# Patient Record
Sex: FEMALE | Race: WHITE | HISPANIC OR LATINO | Employment: UNEMPLOYED | URBAN - METROPOLITAN AREA
[De-identification: names, ages, dates, MRNs, and addresses within clinical notes are randomized per-mention and may not be internally consistent; named-entity substitution may affect disease eponyms.]

---

## 2022-10-13 ENCOUNTER — ULTRASOUND (OUTPATIENT)
Dept: OBGYN CLINIC | Facility: CLINIC | Age: 27
End: 2022-10-13

## 2022-10-13 VITALS
WEIGHT: 197 LBS | SYSTOLIC BLOOD PRESSURE: 118 MMHG | HEIGHT: 66 IN | BODY MASS INDEX: 31.66 KG/M2 | DIASTOLIC BLOOD PRESSURE: 72 MMHG

## 2022-10-13 DIAGNOSIS — Z13.79 GENETIC TESTING: Primary | ICD-10-CM

## 2022-10-13 NOTE — PROGRESS NOTES
Pt is here for early ultrasound   No concerns at this time      LMP   Irregular Cycles   No cycle in July, spotting in august and September for a few day   19 weeks 5 days   JACKLYN 3/4/23  Welcomed Pregnancy   No Vaginal Bleeding   No Nausea or Vomiting    No complications with previous pregnancy/delivery   All Vaginal deliveries         Procedure Details  A second trimester esteban pregnancy seen with biometry at 19+5      Findings:   Viable, esteban intrauterine pregnancy at 19+5 Final EDC to be set by M

## 2022-10-13 NOTE — PROGRESS NOTES
Pt is here for early ultrasound   No concerns at this time       LMP   Irregular Cycles   19 weeks 5 days   JACKLYN 3/4/23  Welcomed Pregnancy   No Vaginal Bleeding   No Nausea or Vomiting     No complications with previous pregnancy/delivery   All Vaginal deliveries         Procedure Details  A second trimester esteban pregnancy seen with biometry at 19+5 footling breech      Findings:   Viable, esteban intrauterine pregnancy at 19+5 Final EDC to be set by M

## 2022-10-14 ENCOUNTER — INITIAL PRENATAL (OUTPATIENT)
Dept: OBGYN CLINIC | Facility: CLINIC | Age: 27
End: 2022-10-14

## 2022-10-14 DIAGNOSIS — Z34.82 PRENATAL CARE, SUBSEQUENT PREGNANCY, SECOND TRIMESTER: Primary | ICD-10-CM

## 2022-10-14 NOTE — PROGRESS NOTES
OB INTAKE INTERVIEW  Patient is 28 y o  who presents for OB intake at 19-6 wks  She is accompanied by: phone interview  The father of her baby Ignacio Boles) is involved in the pregnancy and they are    IAB1  Last Menstrual Period: 2022  Ultrasound: Measured 19 weeks 5 days on 10/13/22  Estimated Date of Delivery: 3/4/2023 via US     Signs/Symptoms of Pregnancy  Current pregnancy symptoms: none  Constipation no  Headaches YES  Cramping/spotting no  PICA cravings no    Diabetes-    If patient has 1 or more, please order early 1 hour GTT  History of GDM no  BMI >35 no  History of PCOS or current metformin use no  History of LGA/macrosomic infant (4000g/9lbs) no    If patient has 2 or more, please order early 1 hour GTT  BMI>30 YES  AMA no  First degree relative with type 2 diabetes no  History of chronic HTN, hyperlipidemia, elevated A1C no  High risk race (, , ,  or ) YES    Hypertension- if you answer yes, please order preeclampsia labs (cbc, comprehensive metabolic panel, urine protein creatinine ratio, uric acid)  History of of chronic HTN no  History of gestational HTN no  History of preeclampsia, eclampsia, or HELLP syndrome no  History of diabetes no  History of lupus, autoimmune disease, kidney disease no    Thyroid- if yes order TSH with reflex T4  History of thyroid disease no    Bleeding Disorder or Hx of DVT-patient or first degree relative with history of  Order the following if not done previously     (Factor V, antithrombin III, prothrombin gene mutation, protein C and S Ag, lupus anticoagulant, anticardiolipin, beta-2 glycoprotein)   no    OB/GYN-  History of abnormal pap smear no  History of HPV no  History of Herpes/HSV no  History of other STI (gonorrhea, chlamydia, trich) no  History of prior  YES  History of prior  no  History of  delivery prior to 36 weeks 6 days no  History of blood transfusion no  Ok for blood transfusion yes    Substance screening- if yes outside of tobacco for her or anyone in her home-order urine drug screen  History of tobacco use no  Currently using tobacco no  Currently using alcohol no  Presently using drugs no  Past drug use  YES-marijuana  IV drug use-If yes add Hep C antibody to labs no  Partner drug use no  Parent/Family drug use no    MRSA Screening-   Does the pt have a hx of MRSA? no  If yes- please follow MRSA protocol and obtain a nasal swab for MRSA culture    Immunizations:  Influenza vaccine given this season no- was given last season  Discussed Tdap vaccine yes  Discussed COVID Vaccine no    Genetic/MFM-  Do you or your partner have a history of any of the following in yourselves or first degree relatives? Cystic fibrosis no  Spinal muscular atrophy no  Hemoglobinopathy/Sickle Cell/Thalassemia no  Fragile X Intellectual Disability no    If yes, discuss carrier screening and recommend consultation with [facility]/genetic counseling  If no, discuss option for carrier screening and/or genetic testing with [facility]  Patient interested no- late PN care  Appointment at [facility] made yes-  Monday 10/17/22    Interview education  [facility] Pregnancy Essentials Book reviewed and discussed yes    Nurse/Family Partnership- patient may qualify no; referral placed no    Prenatal lab work scripts yes  Extra labs ordered:  One hour glucola    The patient has a history now or in prior pregnancy notable for:   IAB1   Late PN care at 19-6 wks  BMI 31 8        Details that I feel the provider should be aware of: Pt did receive a flu vaccine last season  Denies receiving Covid vaccines  PN1 visit scheduled  The patient was oriented to our practice, reviewed delivering physicians and [facility]  All questions were answered  PN phone interview completed  Late PN care at 19-6 wks  Pt is  to [place]; he is father to her 2nd child, as well     Referral entered for [facility]- pt has an appt scheduled for Monday, 10/17/22 for US,  to confirm dates  PN bldwk ordered- encouraged pt to have bldwk drawn asap  Pt has a PN1 visit scheduled for 10/18/22  Advised pt to call with any further questions/concerns       Interviewed by: Prashanth Mendez RN

## 2022-10-15 ENCOUNTER — APPOINTMENT (OUTPATIENT)
Dept: LAB | Facility: HOSPITAL | Age: 27
End: 2022-10-15
Payer: COMMERCIAL

## 2022-10-15 DIAGNOSIS — Z34.82 PRENATAL CARE, SUBSEQUENT PREGNANCY, SECOND TRIMESTER: Primary | ICD-10-CM

## 2022-10-15 LAB
ABO GROUP BLD: NORMAL
BACTERIA UR QL AUTO: ABNORMAL /HPF
BASOPHILS # BLD AUTO: 0.02 THOUSANDS/ΜL (ref 0–0.1)
BASOPHILS NFR BLD AUTO: 0 % (ref 0–1)
BILIRUB UR QL STRIP: NEGATIVE
BLD GP AB SCN SERPL QL: NEGATIVE
CLARITY UR: CLEAR
COLOR UR: ABNORMAL
EOSINOPHIL # BLD AUTO: 0.21 THOUSAND/ΜL (ref 0–0.61)
EOSINOPHIL NFR BLD AUTO: 3 % (ref 0–6)
ERYTHROCYTE [DISTWIDTH] IN BLOOD BY AUTOMATED COUNT: 14.8 % (ref 11.6–15.1)
FERRITIN SERPL-MCNC: 5 NG/ML (ref 8–388)
GLUCOSE 1H P 50 G GLC PO SERPL-MCNC: 103 MG/DL (ref 40–134)
GLUCOSE UR STRIP-MCNC: NEGATIVE MG/DL
HBV SURFACE AG SER QL: NORMAL
HCT VFR BLD AUTO: 31 % (ref 34.8–46.1)
HCV AB SER QL: NORMAL
HGB BLD-MCNC: 9.9 G/DL (ref 11.5–15.4)
HGB UR QL STRIP.AUTO: NEGATIVE
IMM GRANULOCYTES # BLD AUTO: 0.03 THOUSAND/UL (ref 0–0.2)
IMM GRANULOCYTES NFR BLD AUTO: 0 % (ref 0–2)
KETONES UR STRIP-MCNC: NEGATIVE MG/DL
LEUKOCYTE ESTERASE UR QL STRIP: NEGATIVE
LYMPHOCYTES # BLD AUTO: 1.34 THOUSANDS/ΜL (ref 0.6–4.47)
LYMPHOCYTES NFR BLD AUTO: 18 % (ref 14–44)
MCH RBC QN AUTO: 26.4 PG (ref 26.8–34.3)
MCHC RBC AUTO-ENTMCNC: 31.9 G/DL (ref 31.4–37.4)
MCV RBC AUTO: 83 FL (ref 82–98)
MONOCYTES # BLD AUTO: 0.49 THOUSAND/ΜL (ref 0.17–1.22)
MONOCYTES NFR BLD AUTO: 7 % (ref 4–12)
MUCOUS THREADS UR QL AUTO: ABNORMAL
NEUTROPHILS # BLD AUTO: 5.24 THOUSANDS/ΜL (ref 1.85–7.62)
NEUTS SEG NFR BLD AUTO: 72 % (ref 43–75)
NITRITE UR QL STRIP: NEGATIVE
NON-SQ EPI CELLS URNS QL MICRO: ABNORMAL /HPF
NRBC BLD AUTO-RTO: 0 /100 WBCS
PH UR STRIP.AUTO: 7 [PH]
PLATELET # BLD AUTO: 312 THOUSANDS/UL (ref 149–390)
PMV BLD AUTO: 9.7 FL (ref 8.9–12.7)
PROT UR STRIP-MCNC: NEGATIVE MG/DL
RBC # BLD AUTO: 3.75 MILLION/UL (ref 3.81–5.12)
RBC #/AREA URNS AUTO: ABNORMAL /HPF
RH BLD: POSITIVE
RUBV IGG SERPL IA-ACNC: 27.2 IU/ML
SP GR UR STRIP.AUTO: 1.01 (ref 1–1.04)
UROBILINOGEN UA: NEGATIVE MG/DL
WBC # BLD AUTO: 7.33 THOUSAND/UL (ref 4.31–10.16)
WBC #/AREA URNS AUTO: ABNORMAL /HPF

## 2022-10-15 PROCEDURE — 82950 GLUCOSE TEST: CPT

## 2022-10-15 PROCEDURE — 82728 ASSAY OF FERRITIN: CPT

## 2022-10-15 PROCEDURE — 87086 URINE CULTURE/COLONY COUNT: CPT

## 2022-10-15 PROCEDURE — 36415 COLL VENOUS BLD VENIPUNCTURE: CPT

## 2022-10-15 PROCEDURE — 81001 URINALYSIS AUTO W/SCOPE: CPT

## 2022-10-15 PROCEDURE — 80081 OBSTETRIC PANEL INC HIV TSTG: CPT

## 2022-10-15 PROCEDURE — 86803 HEPATITIS C AB TEST: CPT

## 2022-10-16 LAB
BACTERIA UR CULT: NORMAL
RPR SER QL: NORMAL

## 2022-10-17 ENCOUNTER — TELEPHONE (OUTPATIENT)
Dept: LABOR AND DELIVERY | Facility: HOSPITAL | Age: 27
End: 2022-10-17

## 2022-10-17 ENCOUNTER — ROUTINE PRENATAL (OUTPATIENT)
Dept: PERINATAL CARE | Facility: OTHER | Age: 27
End: 2022-10-17
Payer: COMMERCIAL

## 2022-10-17 VITALS
HEIGHT: 66 IN | SYSTOLIC BLOOD PRESSURE: 128 MMHG | HEART RATE: 81 BPM | BODY MASS INDEX: 31.63 KG/M2 | WEIGHT: 196.8 LBS | DIASTOLIC BLOOD PRESSURE: 72 MMHG

## 2022-10-17 DIAGNOSIS — Z36.3 ENCOUNTER FOR ANTENATAL SCREENING FOR MALFORMATION: Primary | ICD-10-CM

## 2022-10-17 DIAGNOSIS — Z3A.20 20 WEEKS GESTATION OF PREGNANCY: Primary | ICD-10-CM

## 2022-10-17 DIAGNOSIS — Z13.79 GENETIC TESTING: ICD-10-CM

## 2022-10-17 DIAGNOSIS — F12.91 HISTORY OF MARIJUANA USE: ICD-10-CM

## 2022-10-17 DIAGNOSIS — Z3A.20 20 WEEKS GESTATION OF PREGNANCY: ICD-10-CM

## 2022-10-17 DIAGNOSIS — Z36.86 ENCOUNTER FOR ANTENATAL SCREENING FOR CERVICAL LENGTH: ICD-10-CM

## 2022-10-17 LAB — HIV 1+2 AB+HIV1 P24 AG SERPL QL IA: NORMAL

## 2022-10-17 PROCEDURE — 76805 OB US >/= 14 WKS SNGL FETUS: CPT | Performed by: OBSTETRICS & GYNECOLOGY

## 2022-10-17 PROCEDURE — 99242 OFF/OP CONSLTJ NEW/EST SF 20: CPT | Performed by: OBSTETRICS & GYNECOLOGY

## 2022-10-17 PROCEDURE — 76817 TRANSVAGINAL US OBSTETRIC: CPT | Performed by: OBSTETRICS & GYNECOLOGY

## 2022-10-17 NOTE — TELEPHONE ENCOUNTER
Attempted to call patient to inform her that [facility] recommended additional blood work that I have entered  This blood work tells us about some of the genetic risks in the pregnancy as well as the formation and development of the baby  No answer, unable to leave a message  Please try to reach to the patient and let her know quad is ordered

## 2022-10-17 NOTE — PROGRESS NOTES
Ultrasound Probe Disinfection    A transvaginal ultrasound was performed  Prior to use, disinfection was performed with High Level Disinfection Process (Trophon)  Probe serial number U2: [id number] was used        Kristy Hargrove  10/17/22  11:02 AM

## 2022-10-17 NOTE — TELEPHONE ENCOUNTER
Please let patient know labs are normal  Labs show anemia and low iron  Recommend starting an oral supplement every other day  If unable to tolerate can consider IV iron       Toma Zuniga MD

## 2022-10-17 NOTE — PATIENT INSTRUCTIONS
Thank you for choosing us for your  care today  If you have any questions about your ultrasound or care, please do not hesitate to contact us or your primary obstetrician  Some general instructions for your pregnancy are:    Protect against coronavirus: get vaccinated - pregnant women are increased risk of severe COVID  Notify your primary care doctor if you have any symptoms  Exercise: Aim for 22 minutes per day (150 minutes per week) of regular exercise  Walking is great! Nutrition: aim for calcium-rich and iron-rich foods as well as healthy sources of protein  Learn about Preeclampsia: preeclampsia is a common, serious high blood pressure complication in pregnancy  A blood pressure of 429RFVL (systolic or top number) or 38ORCE (diastolic or bottom number) is not normal and needs evaluation by your doctor  Aspirin is sometimes prescribed in early pregnancy to prevent preeclampsia in women with risk factors - ask your obstetrician if you should be on this medication  If you smoke, try to reduce how many cigarettes you smoke or try to quit completely  Do not vape  Other warning signs to watch out for in pregnancy or postpartum: chest pain, obstructed breathing or shortness of breath, seizures, thoughts of hurting yourself or your baby, bleeding, a painful or swollen leg, fever, or headache (see [facility] POST-BIRTH Warning Signs campaign)  If these happen call 911  Itching is also not normal in pregnancy and if you experience this, especially over your hands and feet, potentially worse at night, notify your doctors

## 2022-10-17 NOTE — LETTER
October 17, 2022     Elva Scott, [address] [facility] [address]   [address] [address]    Patient: Sb Chacon   YOB: 1995   Date of Visit: 10/17/2022       Dear Dr Sorin Huston: Thank you for referring Sb Chacon to me for evaluation  Below are my notes for this consultation  If you have questions, please do not hesitate to call me  I look forward to following your patient along with you           Sincerely,        Andrzej Solorzano MD        CC: No Recipients

## 2022-10-17 NOTE — PROGRESS NOTES
[id number] [facility]: Ms Frankie Santiago was seen today for anatomic survey and cervical length screening ultrasound  See ultrasound report under "OB Procedures" tab  Review of Systems   Constitutional: Negative for chills, fever and unexpected weight change  HENT: Negative for congestion, dental problem, facial swelling and sore throat  Eyes: Negative for visual disturbance  Respiratory: Negative for cough and shortness of breath  Cardiovascular: Negative for chest pain and palpitations  Gastrointestinal: Negative for diarrhea and vomiting  Endocrine: Negative for polydipsia  Genitourinary: Negative for dysuria and vaginal bleeding  Musculoskeletal: Negative for back pain and joint swelling  Skin: Negative for rash and wound  Allergic/Immunologic: Negative for immunocompromised state  Neurological: Negative for seizures and headaches  Hematological: Does not bruise/bleed easily  Psychiatric/Behavioral: Negative for hallucinations and suicidal ideas  Physical Exam  Constitutional:       General: She is not in acute distress  Appearance: Normal appearance  She is not ill-appearing, toxic-appearing or diaphoretic  HENT:      Head: Normocephalic and atraumatic  Nose: No congestion or rhinorrhea  Eyes:      General: No scleral icterus  Right eye: No discharge  Left eye: No discharge  Extraocular Movements: Extraocular movements intact  Conjunctiva/sclera: Conjunctivae normal    Pulmonary:      Effort: Pulmonary effort is normal  No respiratory distress  Musculoskeletal:      Cervical back: Normal range of motion  Skin:     Coloration: Skin is not jaundiced or pale  Findings: No erythema, lesion or rash  Neurological:      General: No focal deficit present  Mental Status: She is alert and oriented to person, place, and time     Psychiatric:         Mood and Affect: Mood normal          Behavior: Behavior normal  Please don't hesitate to contact our office with any concerns or questions   -Kevin Henning

## 2022-10-18 ENCOUNTER — INITIAL PRENATAL (OUTPATIENT)
Dept: OBGYN CLINIC | Facility: CLINIC | Age: 27
End: 2022-10-18

## 2022-10-18 VITALS
SYSTOLIC BLOOD PRESSURE: 112 MMHG | BODY MASS INDEX: 30.76 KG/M2 | HEIGHT: 67 IN | DIASTOLIC BLOOD PRESSURE: 70 MMHG | WEIGHT: 196 LBS

## 2022-10-18 DIAGNOSIS — O99.012 ANEMIA AFFECTING PREGNANCY IN SECOND TRIMESTER: ICD-10-CM

## 2022-10-18 DIAGNOSIS — Z30.49 ENCOUNTER FOR SURVEILLANCE OF OTHER CONTRACEPTIVE: ICD-10-CM

## 2022-10-18 DIAGNOSIS — Z23 NEED FOR INFLUENZA VACCINATION: ICD-10-CM

## 2022-10-18 DIAGNOSIS — F12.91 HISTORY OF MARIJUANA USE: ICD-10-CM

## 2022-10-18 DIAGNOSIS — Z34.82 PRENATAL CARE, SUBSEQUENT PREGNANCY, SECOND TRIMESTER: Primary | ICD-10-CM

## 2022-10-18 DIAGNOSIS — Z11.3 SCREEN FOR STD (SEXUALLY TRANSMITTED DISEASE): ICD-10-CM

## 2022-10-18 DIAGNOSIS — Z3A.20 20 WEEKS GESTATION OF PREGNANCY: ICD-10-CM

## 2022-10-18 DIAGNOSIS — Z12.4 CERVICAL CANCER SCREENING: ICD-10-CM

## 2022-10-18 PROBLEM — Z30.9 CONTRACEPTION MANAGEMENT: Status: ACTIVE | Noted: 2022-10-18

## 2022-10-18 LAB
SL AMB  POCT GLUCOSE, UA: NEGATIVE
SL AMB POCT URINE PROTEIN: ABNORMAL

## 2022-10-18 PROCEDURE — G0145 SCR C/V CYTO,THINLAYER,RESCR: HCPCS | Performed by: OBSTETRICS & GYNECOLOGY

## 2022-10-18 PROCEDURE — 87491 CHLMYD TRACH DNA AMP PROBE: CPT | Performed by: OBSTETRICS & GYNECOLOGY

## 2022-10-18 PROCEDURE — 87591 N.GONORRHOEAE DNA AMP PROB: CPT | Performed by: OBSTETRICS & GYNECOLOGY

## 2022-10-18 NOTE — ASSESSMENT & PLAN NOTE
Almost sure this is last pregnancy  Previously got pregnant using ParaGard IUD so does not want IUD  Discussed options for permanent contraception including salpingectomy and vasectomy; discussed senia slater  Will continue to discuss

## 2022-10-18 NOTE — ASSESSMENT & PLAN NOTE
Hx irregular periods and did not realize she was this far pregnant until ultrasound; happy with pregnancy  Prenatal labs complete  Gc/chlam, pap done today  Level 2 yesterday - did not ask what gender was but wants to know; encouraged to call back  Has 28 week follow up scan for additional anatomy views and growth

## 2022-10-20 LAB
C TRACH DNA SPEC QL NAA+PROBE: NEGATIVE
N GONORRHOEA DNA SPEC QL NAA+PROBE: NEGATIVE

## 2022-10-22 ENCOUNTER — APPOINTMENT (OUTPATIENT)
Dept: LAB | Facility: HOSPITAL | Age: 27
End: 2022-10-22
Payer: COMMERCIAL

## 2022-10-22 DIAGNOSIS — Z3A.20 20 WEEKS GESTATION OF PREGNANCY: ICD-10-CM

## 2022-10-22 PROCEDURE — 82105 ALPHA-FETOPROTEIN SERUM: CPT

## 2022-10-22 PROCEDURE — 82677 ASSAY OF ESTRIOL: CPT

## 2022-10-22 PROCEDURE — 84702 CHORIONIC GONADOTROPIN TEST: CPT

## 2022-10-22 PROCEDURE — 86336 INHIBIN A: CPT

## 2022-10-24 LAB
LAB AP GYN PRIMARY INTERPRETATION: NORMAL
LAB AP LMP: NORMAL
Lab: NORMAL
PATH INTERP SPEC-IMP: NORMAL

## 2022-10-26 LAB
2ND TRIMESTER 4 SCREEN SERPL-IMP: NORMAL
2ND TRIMESTER 4 SCREEN SERPL-IMP: NORMAL
AFP ADJ MOM SERPL: 0.72
AFP SERPL-MCNC: 41.3 NG/ML
AGE AT DELIVERY: 28.6 YR
FET TS 18 RISK FROM MAT AGE: NORMAL
FET TS 21 RISK FROM MAT AGE: 812
GA METHOD: NORMAL
GA: 21 WEEKS
HCG ADJ MOM SERPL: 0.11
HCG SERPL-ACNC: 2440 MIU/ML
IDDM PATIENT QL: NO
INHIBIN A ADJ MOM SERPL: 1.22
INHIBIN A SERPL-MCNC: 219.42 PG/ML
KARYOTYP BLD/T: NORMAL
MULTIPLE PREGNANCY: NO
NEURAL TUBE DEFECT RISK FETUS: NORMAL %
SERVICE CMNT-IMP: NORMAL
TS 18 RISK FETUS: NORMAL
TS 21 RISK FETUS: NORMAL
U ESTRIOL ADJ MOM SERPL: 1.18
U ESTRIOL SERPL-MCNC: 3.16 NG/ML

## 2022-11-14 ENCOUNTER — ROUTINE PRENATAL (OUTPATIENT)
Dept: OBGYN CLINIC | Facility: CLINIC | Age: 27
End: 2022-11-14

## 2022-11-14 VITALS — SYSTOLIC BLOOD PRESSURE: 120 MMHG | DIASTOLIC BLOOD PRESSURE: 70 MMHG | BODY MASS INDEX: 31.67 KG/M2 | WEIGHT: 199.2 LBS

## 2022-11-14 DIAGNOSIS — M54.9 BACK PAIN AFFECTING PREGNANCY IN SECOND TRIMESTER: ICD-10-CM

## 2022-11-14 DIAGNOSIS — Z34.82 PRENATAL CARE, SUBSEQUENT PREGNANCY, SECOND TRIMESTER: Primary | ICD-10-CM

## 2022-11-14 DIAGNOSIS — O99.891 BACK PAIN AFFECTING PREGNANCY IN SECOND TRIMESTER: ICD-10-CM

## 2022-11-14 DIAGNOSIS — O99.012 ANEMIA AFFECTING PREGNANCY IN SECOND TRIMESTER: ICD-10-CM

## 2022-11-14 DIAGNOSIS — Z3A.24 24 WEEKS GESTATION OF PREGNANCY: ICD-10-CM

## 2022-11-14 LAB
SL AMB  POCT GLUCOSE, UA: NEGATIVE
SL AMB POCT URINE PROTEIN: NEGATIVE

## 2022-11-14 NOTE — ASSESSMENT & PLAN NOTE
24w2d  Doing well  Reviewed the following today:  • S/S PTL including to call if having >  4-6 ctx in an hour  • S/S HTN disorders in pregnancy such as pre-eclampsia  • Orders for routine 3rd trimester bloodwork given today -to be done around 26-28 wks- see ordered  • Tdap vaccine- to be given to all pregnant women in the 3rd trimester (27-36 weeks) of each pregnancy in order to protect against pertussis  It is also recommended that anyone who is going to have close contact with the baby also get the vaccine at their health care provider

## 2022-11-14 NOTE — PROGRESS NOTES
Prenatal Visit  Subjective:   Jevon Sheyla is a 28 y o  [id number] 24w2d here for Routine Prenatal Visit    Denies unusual vaginal discharge, LOF, VB, or ctx  Reports Fetus is active  C/O back pain  All of back, not just lower back  A lot in her shoulders  Sleeping with pillow between her legs  Taking tylenol for this  Mild anemia on routine PNBW- NOT taking iron daily/every other day      Objective:  Vitals:    11/14/22 1123   BP: 120/70     Pregravid Weight/BMI: 79 4 kg (175 lb) (BMI 27 83)  Current Weight: 90 4 kg (199 lb 3 2 oz)   Total Weight Gain: 11 kg (24 lb 3 2 oz)     Fetal Heart Rate: 145  Fundal Height (cm): 24 cm    OBGyn Exam  Physical Exam  Fetal Heart Rate: 145 , Fundal Height (cm): 24 cm  General: Not in acute distress and appearing well nourished and well groomed  Genitourinary:          Pelvic exam exam deferred   Cardiovascular:   Rate and Rhythm: Normal rate  Pulmonary:    Effort: normal, not labored  Abdominal:  Abdomen is soft and gravid    Musculoskeletal: Active movement of all extremities, no gross limitations of ROM     Edema: None  Neurological:   Mental Status: She is alert and oriented to person, place, and time  Skin: General: Skin is warm and dry  Psychiatric:    Mood and Affect: Mood normal       Behavior: Behavior normal      Assessment & Plan:        1  Prenatal care, subsequent pregnancy, second trimester  -     Anemia Panel w/Reflex, OB; Future; Expected date: 11/26/2022  -     CBC and differential; Future; Expected date: 11/26/2022  -     Glucose, 1H PG; Future; Expected date: 11/26/2022  -     RPR; Future; Expected date: 11/26/2022  -     POCT urine dip    2  24 weeks gestation of pregnancy  Assessment & Plan:  24w2d  Doing well  Reviewed the following today:  • S/S PTL including to call if having >  4-6 ctx in an hour     • S/S HTN disorders in pregnancy such as pre-eclampsia  • Orders for routine 3rd trimester bloodwork given today -to be done around 26-28 wks- see ordered  • Tdap vaccine- to be given to all pregnant women in the 3rd trimester (27-36 weeks) of each pregnancy in order to protect against pertussis  It is also recommended that anyone who is going to have close contact with the baby also get the vaccine at their health care provider  3  Anemia affecting pregnancy in second trimester  Assessment & Plan:  Is not Taking iron as previously directed  Due for recheck with 3rd trimester labs  Encouraged to start asap  4  Back pain affecting pregnancy in second trimester  Assessment & Plan:  Pt c/o back pain   Reviewed most commonly due to anatomic changes in pregnancy affecting curvature of lower back and altering center of gravity- making pelvic girdle slighlty dysfunctional/unbalanced  However may be more involved as she had upper back and shoulder pain as well  Reviewed supportive measures including Tylenol, heat/ice and at home back exercises/stretches  Also reviewed availability of PT or chiropractic adjustments, both which are shown to decrease discomfort for most women              ROSA Kruger  11/14/2022

## 2022-11-14 NOTE — PATIENT INSTRUCTIONS
Tailor Sit, Trunk Turn for Back Pain During Pregnancy  Before trying these exercises, talk to your healthcare provider to make sure they are safe for you  Ask your healthcare provider how many times to do each exercise  Tailor sit  This exercise makes your thigh, pelvic, and hip muscles more flexible  Sit on the floor with the soles of your feet together  Your back should be straight  Gently lean forward until you feel a mild stretch in your hip and thigh muscles  Your back should remain straight  Don't push down on your legs with your hands  Hold and count to 5, then relax  Trunk turns  This helps make your trunk (from your shoulders to your hips) more flexible  Sit on the floor with your legs crossed  Your back should be straight  Put your left hand on your right knee  Rest your right hand on the floor to support yourself and help you balance  Slowly twist right  To do this, turn your head, shoulders, and chest as far right as you comfortably can  Keep your hips, knees, and feet in place  Hold for 5 counts  Then change sides and slowly twist left  Wall Stretch, Body Bend  Before trying these exercises, talk to your healthcare provider to make sure they are safe for you  Ask your healthcare provider how many times to do each exercise  Wall stretch  This strengthens and loosens the muscles in your upper back:  Lean against a wall with a firm pillow or rolled towel under your shoulder blades  Your feet should be about 12 inches from the wall and shoulder-width apart  Point your chin down  Breathe in  Push your shoulders, neck, and head against the wall  You will feel a stretch in your shoulders  Hold for 5 counts  Then breathe out, and relax your shoulders and neck  Pelvic tilt  This exercise stretches muscles in your buttocks and lower back  It also strengthens your stomach and helps set up good posture  Get on your hands and knees with your back straight   A mat can help cushion your knees   Try to pull your stomach muscles in  Tuck in your buttocks  This will tilt your pelvis up  As your pelvis tilts, your back will rise toward the ceiling  Hold and count to 5, then relax  Leg lifts  This strengthens the muscles of your back, buttocks, and stomach  Get down on your hands and knees  Put your arms directly under your shoulders  Keep your knees shoulder-width apart  Round your back  Then lift your left knee and gently bring it toward your elbow  Look at your knee as you raise it  (Stop moving your knee if you feel pressure in your stomach )  Keeping your knee slightly bent, extend your leg  Lift your leg until you feel a stretch in your low back  Don't lift your leg higher than your hip  Hold for 5 counts, then lower your left leg  Repeat the exercise with your right leg  Body bends  This strengthens your back and buttocks muscles:  Stand with your legs shoulder-width apart  Put your hands on your upper thighs and bend your knees slightly  Slowly bend forward at the hips  Push your hips back and keep your shoulders up  Make sure your back is straight  Nahed Edgar feel a stretch in your upper thighs  Nahed Edgar also feel your back muscles holding you in position    Hold for 5 counts, then straighten   ------------------------------------------    Can use over the counter hydrocortisone for nipple itching    _--------------------__----------------------------------

## 2022-11-14 NOTE — ASSESSMENT & PLAN NOTE
Pt c/o back pain   Reviewed most commonly due to anatomic changes in pregnancy affecting curvature of lower back and altering center of gravity- making pelvic girdle slighlty dysfunctional/unbalanced  However may be more involved as she had upper back and shoulder pain as well  Reviewed supportive measures including Tylenol, heat/ice and at home back exercises/stretches  Also reviewed availability of PT or chiropractic adjustments, both which are shown to decrease discomfort for most women

## 2022-11-14 NOTE — ASSESSMENT & PLAN NOTE
Is not Taking iron as previously directed  Due for recheck with 3rd trimester labs  Encouraged to start asap

## 2022-11-14 NOTE — PROGRESS NOTES
Pt here for prenatal visit  GA: 24w 2d  Pt C/o back pain   Denies LOF,VB,CTX  Good FM  28 Week labs ordered today   UTD with flu vaccine

## 2022-11-19 ENCOUNTER — LAB (OUTPATIENT)
Dept: LAB | Facility: HOSPITAL | Age: 27
End: 2022-11-19

## 2022-11-19 DIAGNOSIS — Z34.82 PRENATAL CARE, SUBSEQUENT PREGNANCY, SECOND TRIMESTER: ICD-10-CM

## 2022-11-19 DIAGNOSIS — O99.810 ABNORMAL GLUCOSE AFFECTING PREGNANCY: Primary | ICD-10-CM

## 2022-11-19 LAB
BASOPHILS # BLD AUTO: 0.04 THOUSANDS/ÂΜL (ref 0–0.1)
BASOPHILS NFR BLD AUTO: 1 % (ref 0–1)
EOSINOPHIL # BLD AUTO: 0.13 THOUSAND/ÂΜL (ref 0–0.61)
EOSINOPHIL NFR BLD AUTO: 2 % (ref 0–6)
ERYTHROCYTE [DISTWIDTH] IN BLOOD BY AUTOMATED COUNT: 15.5 % (ref 11.6–15.1)
FERRITIN SERPL-MCNC: 5 NG/ML (ref 8–388)
GLUCOSE 1H P 50 G GLC PO SERPL-MCNC: 146 MG/DL (ref 40–134)
HCT VFR BLD AUTO: 28.9 % (ref 34.8–46.1)
HGB BLD-MCNC: 9 G/DL (ref 11.5–15.4)
IMM GRANULOCYTES # BLD AUTO: 0.08 THOUSAND/UL (ref 0–0.2)
IMM GRANULOCYTES NFR BLD AUTO: 1 % (ref 0–2)
LYMPHOCYTES # BLD AUTO: 1.47 THOUSANDS/ÂΜL (ref 0.6–4.47)
LYMPHOCYTES NFR BLD AUTO: 18 % (ref 14–44)
MCH RBC QN AUTO: 26.6 PG (ref 26.8–34.3)
MCHC RBC AUTO-ENTMCNC: 31.1 G/DL (ref 31.4–37.4)
MCV RBC AUTO: 86 FL (ref 82–98)
MONOCYTES # BLD AUTO: 0.44 THOUSAND/ÂΜL (ref 0.17–1.22)
MONOCYTES NFR BLD AUTO: 6 % (ref 4–12)
NEUTROPHILS # BLD AUTO: 5.89 THOUSANDS/ÂΜL (ref 1.85–7.62)
NEUTS SEG NFR BLD AUTO: 72 % (ref 43–75)
NRBC BLD AUTO-RTO: 0 /100 WBCS
PLATELET # BLD AUTO: 277 THOUSANDS/UL (ref 149–390)
PMV BLD AUTO: 9.7 FL (ref 8.9–12.7)
RBC # BLD AUTO: 3.38 MILLION/UL (ref 3.81–5.12)
WBC # BLD AUTO: 8.05 THOUSAND/UL (ref 4.31–10.16)

## 2022-11-20 LAB — RPR SER QL: NORMAL

## 2022-11-21 ENCOUNTER — TELEPHONE (OUTPATIENT)
Dept: OBGYN CLINIC | Facility: CLINIC | Age: 27
End: 2022-11-21

## 2022-11-21 DIAGNOSIS — O99.012 ANEMIA AFFECTING PREGNANCY IN SECOND TRIMESTER: Primary | ICD-10-CM

## 2022-11-21 RX ORDER — SODIUM CHLORIDE 9 MG/ML
20 INJECTION, SOLUTION INTRAVENOUS ONCE
OUTPATIENT
Start: 2022-11-28

## 2022-11-21 NOTE — RESULT ENCOUNTER NOTE
Abnromal glucola  Needs 3 hour gtt  Also H/H low as well as ferritin  Will need IV iron infusions  Orders  entered   Please notify pt

## 2022-11-21 NOTE — TELEPHONE ENCOUNTER
Patient notified of test results  Out of town for the remainder of this week  Will call infusion center to schedule  Patient lives in [facility] so prefers [facility] for infusions  Aware we will call her first thing in morning

## 2022-11-21 NOTE — TELEPHONE ENCOUNTER
----- Message from Nicole Bell, Gio David sent at 11/21/2022  7:49 AM EST -----  Abnromal glucola  Needs 3 hour gtt  Also H/H low as well as ferritin  Will need IV iron infusions  Orders  entered   Please notify pt

## 2022-11-22 ENCOUNTER — TELEPHONE (OUTPATIENT)
Dept: OBGYN CLINIC | Facility: CLINIC | Age: 27
End: 2022-11-22

## 2022-11-22 DIAGNOSIS — O99.012 ANEMIA AFFECTING PREGNANCY IN SECOND TRIMESTER: Primary | ICD-10-CM

## 2022-11-22 NOTE — TELEPHONE ENCOUNTER
Patient's insurance does not cover infusions - being that she is anemic due to pregnancy the only option would be to reach out to financial counseling      Yulisa @ [phone] if any questions

## 2022-11-22 NOTE — TELEPHONE ENCOUNTER
Called pt- per pt any day next week in am is good  Pt states "she will need to bring her child with her"  Informed pt , not sure if infusion center will allow children  Called [facility] infusion center - first venofer infusion scheduled for Wed 11/30/22 at 0900 , per , children are not allowed  Called pt to inform of date & time, & advised of no children policy  Pt stated "she will find someone to watch her child"

## 2022-11-23 ENCOUNTER — DOCUMENTATION (OUTPATIENT)
Dept: HEMATOLOGY ONCOLOGY | Facility: CLINIC | Age: 27
End: 2022-11-23

## 2022-11-23 NOTE — TELEPHONE ENCOUNTER
Lm on pt cell, to call financial to get assistance, as I called her insurance and confirmed they do not cover infusions as she has a limited indemnity plan not major medical [id number]

## 2022-11-23 NOTE — PROGRESS NOTES
Received email from Leonidas Puri that pts ins is a limited indemnity plan  Called ins & spoke to [facility] call ref# [id number]  Pt does have a limited indemnity plan effective 10/02/22  plan runs on a cal year  Plan is thru [address] has the following benefits  THE MAX THE PLAN WILL PAY IS $25K for all these services combined    [address] pays $700 per day up to 25K    IN PT SURGEON  OUT PT SURGEON  Need cpt codes for the amount the ins pays    PCP & SPECIALIST OFFICE VISITS  Plan pays $70 per day up to 25K    PET SCAN & MRI & CT SCAN  Plan pays $300 per day up to $25K    XRAY  Plan pays $55 per day up to $25K    LABS  Plan pays $15 per day up to $25K    No benefits for the following  [address] pt to go over this info & she sd that she will have to look around for other ins  She is going to go on the [employer] website & then she will reach out to me if she has any questions about plans that she likes to make sure that we are par w/that plan  Also gave her the # to the f/c for the hospital since she has a high bal  She thanked me for the call & the info

## 2022-12-13 ENCOUNTER — ROUTINE PRENATAL (OUTPATIENT)
Dept: OBGYN CLINIC | Facility: CLINIC | Age: 27
End: 2022-12-13

## 2022-12-13 VITALS — DIASTOLIC BLOOD PRESSURE: 68 MMHG | BODY MASS INDEX: 32.18 KG/M2 | WEIGHT: 202.4 LBS | SYSTOLIC BLOOD PRESSURE: 118 MMHG

## 2022-12-13 DIAGNOSIS — O99.810 ABNORMAL GLUCOSE AFFECTING PREGNANCY: ICD-10-CM

## 2022-12-13 DIAGNOSIS — Z34.93 PRENATAL CARE IN THIRD TRIMESTER: Primary | ICD-10-CM

## 2022-12-13 DIAGNOSIS — Z3A.28 28 WEEKS GESTATION OF PREGNANCY: ICD-10-CM

## 2022-12-13 DIAGNOSIS — O99.012 ANEMIA AFFECTING PREGNANCY IN SECOND TRIMESTER: ICD-10-CM

## 2022-12-13 LAB
SL AMB  POCT GLUCOSE, UA: NORMAL
SL AMB POCT URINE PROTEIN: NORMAL

## 2022-12-13 RX ORDER — FERROUS SULFATE 325(65) MG
325 TABLET ORAL
COMMUNITY

## 2022-12-13 NOTE — ASSESSMENT & PLAN NOTE
Referred for IV iron given Hgb 9 0  Pt's insurance will not cover infusions  She is currently working on applying for [employer]  In the meantime has started PO iron and started introducing more iron rich foods in her diet  Last CBC was 11/19/22  Will plan to repeat CBC now while we wait for her new insurance   Continue PO iron for now  Advised to complete CBC/anemia panel within the next week

## 2022-12-13 NOTE — PROGRESS NOTES
28 weeks gestation of pregnancy  Rosa Maria  is a 28 y o  [id number] @28w3d who presents for routine prenatal visit  28 wk labs - complete; see note below regarding anemia and elevated 1 hour  Growth scan- scheduled  TDAP- will have today   Has yellow packet  Reports good fetal movement  Denies LOF, vaginal bleeding, regular uterine contractions, cramping, headaches or visual changes  Reviewed PTL/Labor precautions and FKC  Abnormal glucose affecting pregnancy  Reminded patient she must complete 3 hour GTT    Anemia affecting pregnancy in second trimester  Referred for IV iron given Hgb 9 0  Pt's insurance will not cover infusions  She is currently working on applying for [employer]  In the meantime has started PO iron and started introducing more iron rich foods in her diet  Last CBC was 11/19/22  Will plan to repeat CBC now while we wait for her new insurance   Continue PO iron for now  Advised to complete CBC/anemia panel within the next week

## 2022-12-13 NOTE — ASSESSMENT & PLAN NOTE
Gwen Wang  is a 28 y o  [id number] @28w3d who presents for routine prenatal visit  28 wk labs - complete; see note below regarding anemia and elevated 1 hour  Growth scan- scheduled  TDAP- will have today   Has yellow packet  Reports good fetal movement  Denies LOF, vaginal bleeding, regular uterine contractions, cramping, headaches or visual changes  Reviewed PTL/Labor precautions and FKC

## 2022-12-17 ENCOUNTER — APPOINTMENT (OUTPATIENT)
Dept: LAB | Facility: HOSPITAL | Age: 27
End: 2022-12-17

## 2022-12-17 DIAGNOSIS — O99.012 ANEMIA AFFECTING PREGNANCY IN SECOND TRIMESTER: ICD-10-CM

## 2022-12-17 LAB
BASOPHILS # BLD AUTO: 0.05 THOUSANDS/ÂΜL (ref 0–0.1)
BASOPHILS NFR BLD AUTO: 1 % (ref 0–1)
EOSINOPHIL # BLD AUTO: 0.19 THOUSAND/ÂΜL (ref 0–0.61)
EOSINOPHIL NFR BLD AUTO: 2 % (ref 0–6)
ERYTHROCYTE [DISTWIDTH] IN BLOOD BY AUTOMATED COUNT: 18 % (ref 11.6–15.1)
HCT VFR BLD AUTO: 32.8 % (ref 34.8–46.1)
HGB BLD-MCNC: 9.8 G/DL (ref 11.5–15.4)
IMM GRANULOCYTES # BLD AUTO: 0.1 THOUSAND/UL (ref 0–0.2)
IMM GRANULOCYTES NFR BLD AUTO: 1 % (ref 0–2)
LYMPHOCYTES # BLD AUTO: 1.96 THOUSANDS/ÂΜL (ref 0.6–4.47)
LYMPHOCYTES NFR BLD AUTO: 22 % (ref 14–44)
MCH RBC QN AUTO: 26.7 PG (ref 26.8–34.3)
MCHC RBC AUTO-ENTMCNC: 29.9 G/DL (ref 31.4–37.4)
MCV RBC AUTO: 89 FL (ref 82–98)
MONOCYTES # BLD AUTO: 0.63 THOUSAND/ÂΜL (ref 0.17–1.22)
MONOCYTES NFR BLD AUTO: 7 % (ref 4–12)
NEUTROPHILS # BLD AUTO: 6.03 THOUSANDS/ÂΜL (ref 1.85–7.62)
NEUTS SEG NFR BLD AUTO: 67 % (ref 43–75)
NRBC BLD AUTO-RTO: 0 /100 WBCS
PLATELET # BLD AUTO: 255 THOUSANDS/UL (ref 149–390)
PMV BLD AUTO: 9.8 FL (ref 8.9–12.7)
RBC # BLD AUTO: 3.67 MILLION/UL (ref 3.81–5.12)
WBC # BLD AUTO: 8.96 THOUSAND/UL (ref 4.31–10.16)

## 2022-12-18 LAB — FERRITIN SERPL-MCNC: 15 NG/ML (ref 8–388)

## 2022-12-20 ENCOUNTER — LAB (OUTPATIENT)
Dept: LAB | Facility: HOSPITAL | Age: 27
End: 2022-12-20

## 2022-12-20 DIAGNOSIS — O99.810 ABNORMAL GLUCOSE AFFECTING PREGNANCY: ICD-10-CM

## 2022-12-20 DIAGNOSIS — O24.410 DIET CONTROLLED GESTATIONAL DIABETES MELLITUS (GDM) IN THIRD TRIMESTER: Primary | ICD-10-CM

## 2022-12-20 LAB — GLUCOSE P FAST SERPL-MCNC: 103 MG/DL (ref 65–94)

## 2022-12-20 NOTE — RESULT ENCOUNTER NOTE
FBS on 3 hour gtt abnormal= GDM dx  Referral to [facility] Diabetes entered     Pt notified

## 2022-12-27 ENCOUNTER — ROUTINE PRENATAL (OUTPATIENT)
Dept: OBGYN CLINIC | Facility: CLINIC | Age: 27
End: 2022-12-27

## 2022-12-27 VITALS — BODY MASS INDEX: 32.31 KG/M2 | WEIGHT: 203.2 LBS | DIASTOLIC BLOOD PRESSURE: 62 MMHG | SYSTOLIC BLOOD PRESSURE: 122 MMHG

## 2022-12-27 DIAGNOSIS — O99.012 ANEMIA AFFECTING PREGNANCY IN SECOND TRIMESTER: ICD-10-CM

## 2022-12-27 DIAGNOSIS — O24.410 DIET CONTROLLED GESTATIONAL DIABETES MELLITUS (GDM) IN THIRD TRIMESTER: ICD-10-CM

## 2022-12-27 DIAGNOSIS — Z30.49 ENCOUNTER FOR SURVEILLANCE OF OTHER CONTRACEPTIVE: ICD-10-CM

## 2022-12-27 DIAGNOSIS — Z34.83 PRENATAL CARE, SUBSEQUENT PREGNANCY, THIRD TRIMESTER: Primary | ICD-10-CM

## 2022-12-27 DIAGNOSIS — Z3A.30 30 WEEKS GESTATION OF PREGNANCY: ICD-10-CM

## 2022-12-27 LAB
SL AMB  POCT GLUCOSE, UA: NORMAL
SL AMB POCT URINE PROTEIN: NORMAL

## 2022-12-27 NOTE — ASSESSMENT & PLAN NOTE
Had elevated 1h GCT and elevated fasting on 3h GTT  She states she ate at 1am  (~7 hrs prior to fasting test) - discussed could be reason for elevation or could be truly elevated  Option to repeat 3h GTT vs just check fingersticks for a week discussed - she has appointment tomorrow with the diabetic educator at [place] [address]  She will likely just keep this appointment and check fingersticks for one week  Given above information, if all fingersticks normal after one week, could likely discontinue     No results found for: HGBA1C

## 2022-12-27 NOTE — ASSESSMENT & PLAN NOTE
Patient's insurance will not cover IV iron  Last CBC on 12/17/22 showed rise to 9 8 from 9 0  Continue oral iron

## 2022-12-27 NOTE — PROGRESS NOTES
Prenatal visit at 27 3/7wks  Some BH ctxs but nothing regular or painful  Denies VB, LOF  Good FM  Uncomfortable from pelvic pressure at times but overall doing well  Problem List Items Addressed This Visit        Endocrine    Diet controlled gestational diabetes mellitus (GDM) in third trimester     Had elevated 1h GCT and elevated fasting on 3h GTT  She states she ate at 1am  (~7 hrs prior to fasting test) - discussed could be reason for elevation or could be truly elevated  Option to repeat 3h GTT vs just check fingersticks for a week discussed - she has appointment tomorrow with the diabetic educator at [place] [address]  She will likely just keep this appointment and check fingersticks for one week  Given above information, if all fingersticks normal after one week, could likely discontinue  No results found for: HGBA1C            Other    30 weeks gestation of pregnancy     28 week labs complete - see GDM diagnosis for details of GDM testing and Anemia diagnosis for details  Various routes of delivery reviewed including risks/benefits of each  All questions answered, and delivery consent signed  Up to date on flu, Tdap vaccines   labor precautions reviewed  Contraception management     Trying to convince  to have vasectomy but thus far, he is unwilling  Will plan depo in hospital as bridge to interval tubal sterilization  Uncertain of patient's insurance coverage - will ask clerical staff to verify and sign MA-31 at next visit if medical assistance in the event of unplanned   Anemia affecting pregnancy in second trimester     Patient's insurance will not cover IV iron  Last CBC on 22 showed rise to 9 8 from 9 0  Continue oral iron            Other Visit Diagnoses     Prenatal care, subsequent pregnancy, third trimester    -  Primary    Relevant Orders    POCT urine dip (Completed)

## 2022-12-27 NOTE — ASSESSMENT & PLAN NOTE
Trying to convince  to have vasectomy but thus far, he is unwilling  Will plan depo in hospital as bridge to interval tubal sterilization  Uncertain of patient's insurance coverage - will ask clerical staff to verify and sign MA-31 at next visit if medical assistance in the event of unplanned

## 2022-12-27 NOTE — ASSESSMENT & PLAN NOTE
28 week labs complete - see GDM diagnosis for details of GDM testing and Anemia diagnosis for details  Various routes of delivery reviewed including risks/benefits of each  All questions answered, and delivery consent signed  Up to date on flu, Tdap vaccines   labor precautions reviewed

## 2022-12-28 ENCOUNTER — TELEMEDICINE (OUTPATIENT)
Dept: PERINATAL CARE | Facility: CLINIC | Age: 27
End: 2022-12-28

## 2022-12-28 DIAGNOSIS — Z3A.30 30 WEEKS GESTATION OF PREGNANCY: ICD-10-CM

## 2022-12-28 DIAGNOSIS — O24.410 DIET CONTROLLED GESTATIONAL DIABETES MELLITUS (GDM) IN THIRD TRIMESTER: Primary | ICD-10-CM

## 2022-12-28 DIAGNOSIS — E66.3 OVERWEIGHT WITH BODY MASS INDEX (BMI) OF 27 TO 27.9 IN ADULT: ICD-10-CM

## 2022-12-28 RX ORDER — BLOOD-GLUCOSE METER
EACH MISCELLANEOUS
Qty: 1 KIT | Refills: 0 | Status: SHIPPED | OUTPATIENT
Start: 2022-12-28 | End: 2023-03-04

## 2022-12-28 RX ORDER — BLOOD SUGAR DIAGNOSTIC
STRIP MISCELLANEOUS
Qty: 100 STRIP | Refills: 4 | Status: SHIPPED | OUTPATIENT
Start: 2022-12-28 | End: 2023-03-04

## 2022-12-28 RX ORDER — LANCETS 33 GAUGE
EACH MISCELLANEOUS
Qty: 100 EACH | Refills: 4 | Status: SHIPPED | OUTPATIENT
Start: 2022-12-28 | End: 2023-03-04

## 2022-12-28 NOTE — PROGRESS NOTES
Virtual Regular Visit    Verification of patient location:  [place]    Patient is located in the following state in which I hold an active license [place]      Assessment/Plan:    Problem List Items Addressed This Visit        Endocrine    Diet controlled gestational diabetes mellitus (GDM) in third trimester            Reason for visit is   Chief Complaint   Patient presents with   • Virtual Regular Visit        Encounter provider Carissa Sage    Provider located at [address] [place] [id number] [phone]      Recent Visits  No visits were found meeting these conditions  Showing recent visits within past 7 days and meeting all other requirements  Today's Visits  Date Type Provider Dept   22 [facility] [facility]   Showing today's visits and meeting all other requirements  Future Appointments  No visits were found meeting these conditions  Showing future appointments within next 150 days and meeting all other requirements       The patient was identified by name and date of birth  Jaclyn Hutson was informed that this is a telemedicine visit and that the visit is being conducted through the [address] Now platform  She agrees to proceed     My office door was closed  No one else was in the room  She acknowledged consent and understanding of privacy and security of the video platform  The patient has agreed to participate and understands they can discontinue the visit at any time  Patient is aware this is a billable service  Leticia Jolly is a 28 y o  female pregnant patient  HPI     Past Medical History:   Diagnosis Date   • Migraine     occas     • Varicella     had vaccines       Past Surgical History:   Procedure Laterality Date   • [facility]UC[facility]       [facility]       Current Outpatient Medications   Medication Sig Dispense Refill   • Acetaminophen (TYLENOL 8 HOUR PO) Take by mouth     • ferrous sulfate 325 (65 Fe) mg tablet Take 325 mg by mouth daily with breakfast     • Prenatal Vit-Fe Fumarate-FA (PRENATAL VITAMIN PO) Take by mouth       No current facility-administered medications for this visit  No Known Allergies    Review of Systems    Video Exam    There were no vitals filed for this visit  Physical Exam --not performed  I spent 60 minutes directly with the patient during this visit           Thank you for referring your patient to [facility] Maternal Fetal Medicine Diabetes in Pregnancy Program      Mayra Jaffe is a  28 y o  female who presents today for Group Class 1  Patient is at 30w4d gestation, Estimated Date of Delivery: 3/4/23  Reviewed and updated the following from patients medical record: PMH, Problem List, Allergies, and Current Medications  Visit Diagnosis:  Diet controlled GDM    Discussed with patient pathophysiology of GDM, untreated hyperglycemia in pregnancy and maternal fetal complications including fetal macrosomia,  hypoglycemia, polyhydramnios, increased incidence of  section,  labor, and in severe cases fetal demise and still birth   Discussed importance of blood glucose monitoring, nutrition, and medication if necessary in achieving BG goals  Additional Pregnancy Complications:  Overweight prior to pregnancy       Labs:    Lab Results   Component Value Date    [id number] 146 (H) 2022       Lab Results   Component Value Date    GLUF 103 (H) 2022        No components found for: HGA1C    Medications:  No diabetes related medications    Anthropometrics:  Ht Readings from Last 3 Encounters:   10/18/22 5' 6 5" (1 689 m)   10/17/22 5' 6" (1 676 m)   10/13/22 5' 6" (1 676 m)     Wt Readings from Last 3 Encounters:   22 92 2 kg (203 lb 3 2 oz)   22 91 8 kg (202 lb 6 4 oz)   22 90 4 kg (199 lb 3 2 oz)     Pre-gravid weight: 79 4 kg (175 lb)  Pre-gravid BMI: 27 83  Weight Change: 12 8 kg (28 lb 3 2 oz)  Weight gain recommendations: BMI (25-29 9) 15-25 lbs   Comments: Patient needs to maintain her weight for the remainder of the pregnancy  Recent Ultra Sound Results:  Date: 10/17/22  Fetal Growth: Normal  FARSHAD: Normal  Next US date: 1/3/23    Blood Glucose Monitoring:   Glucose Meter: OneTouch Verio Flex  Instructed on testing blood sugars: 4 x per day (Fasting, 2 hour after start of each meal)    Gave instruction on site selection, skin preparation, loading strips and lancet device, meter activation, obtaining blood sample, test strip and lancet disposal and storage, and recording log book entries  Patient has good understanding of material covered and was able to test their own blood sugar in office today  Instruction for reporting blood sugar results weekly via:  Phone: [phone]   OR  My Chart (Message with image attachment, or Glucose Flowsheet)    Goal Blood Sugar Ranges:   Fastin-90 mg/dL  1 hour after the start of each meal: 140 mg/dL or less  2 hours after start of each meal: 120 mg/dL or less    Meal Plan (daily calorie and protein needs):  Calories: 2200 calorie (CHO:86-27-17-30-60-30) (PRO: 3-2-3/4-2-3/4-2)    Type of Diet:Regular  Additional Nutrition Concerns: None    Meal Plan Tips:  1  Patient was provided with a meal plan including 3 meals and 3 snacks  2  Discussed appropriate amounts of CHO, PRO, and Fat at each meal and snack  3  Reviewed CHO exchange list, and portion sizes for both CHO and PRO via food models  4  Instruction on how to read a food label  5  Provided suggested meal/snack options to increase nutrition and maintain consistent meal and snack intakes  6  Instructed on how to keep a 3-day food diary to be brought to follow- up appointment  7  Encouraged  patient to eat every 2 0-3 5 hours while awake  8  Encouraged patient to go no longer than 8-10 hours fasting overnight until first meal of the day        Physical Activity:  Discussed benefits of physical activity to optimize blood glucose control, encouraged activity at patient is physically able  Always consult a physician prior to starting an exercise program  Recommend 20-30 minutes daily  Patient Stated Goal: "I will check my blood sugar 4 times each day, as directed by diabetes and pregnancy team"    Diabetes Self Management Support Plan outside of ongoing care: Spouse/Family    Learner/s Present:Learners Present: Patient   Barriers to Learning/Change: Cultural  Expected Compliance: good    Date to report blood sugars: Wednesday, 1/4/23  Class 2 (date): Thursday, 1/5/23    Begin Time: 1:05 PM  End Time: 2:10 PM    It was a pleasure working with them today  Please feel free to call with any questions or concerns      Nikkie Stanford  Diabetes Educator  [facility] Maternal Fetal Medicine  Diabetes in Pregnancy Program  [address] [place] [address],[address]  [address]

## 2023-01-05 ENCOUNTER — TELEPHONE (OUTPATIENT)
Facility: HOSPITAL | Age: 28
End: 2023-01-05

## 2023-01-05 NOTE — TELEPHONE ENCOUNTER
Spoke with patient by phone today who was able to connect to group class 2 virtually at the end of class and then disconnected 10 minutes later  Patient reported being at an appointment with her daughter and was unable to continue class  Spoke with patient and class 2 was rescheduled to 1/12/23  Advised patient to report her blood sugars on the [employer] glucose flow sheet  Noted 2 ultrasound appointment 1/10/23 and 1/11/23 scheduled  Patient reported she was aware of 1/10/23 and that 1/11/23 was an error

## 2023-01-10 ENCOUNTER — ULTRASOUND (OUTPATIENT)
Dept: PERINATAL CARE | Facility: OTHER | Age: 28
End: 2023-01-10

## 2023-01-10 VITALS
DIASTOLIC BLOOD PRESSURE: 62 MMHG | SYSTOLIC BLOOD PRESSURE: 114 MMHG | WEIGHT: 204.6 LBS | HEART RATE: 87 BPM | HEIGHT: 67 IN | BODY MASS INDEX: 32.11 KG/M2

## 2023-01-10 DIAGNOSIS — O24.410 DIET CONTROLLED GESTATIONAL DIABETES MELLITUS (GDM) IN THIRD TRIMESTER: ICD-10-CM

## 2023-01-10 DIAGNOSIS — Z28.310 COVID-19 VACCINE SERIES DECLINED: ICD-10-CM

## 2023-01-10 DIAGNOSIS — Z3A.32 32 WEEKS GESTATION OF PREGNANCY: Primary | ICD-10-CM

## 2023-01-10 DIAGNOSIS — Z28.21 COVID-19 VACCINE SERIES DECLINED: ICD-10-CM

## 2023-01-10 NOTE — LETTER
January 11, 2023     Anup Us, [address] [facility] [address]   [address] [place] [address]    Patient: William Calvin   YOB: 1995   Date of Visit: 1/10/2023       Dear Dr Torres Figures: Thank you for referring William Calvin to me for evaluation  Below are my notes for this consultation  If you have questions, please do not hesitate to call me  I look forward to following your patient along with you  Sincerely,        Mamadou Almaguer MD        CC: No Recipients  Mamadou Almaguer MD  1/11/2023 12:36 PM  Sign when Signing Visit  A fetal ultrasound was completed  See Ob procedures in Epic for an interpretation and recommendations  Do not hesitate to contact us in [facility] if you have questions  David Aparicio MD, [address] [place] [address]  Maternal Fetal Medicine

## 2023-01-11 PROBLEM — Z28.310 COVID-19 VACCINE SERIES DECLINED: Status: ACTIVE | Noted: 2023-01-11

## 2023-01-11 PROBLEM — Z3A.32 32 WEEKS GESTATION OF PREGNANCY: Status: ACTIVE | Noted: 2023-01-11

## 2023-01-11 PROBLEM — Z28.21 COVID-19 VACCINE SERIES DECLINED: Status: ACTIVE | Noted: 2023-01-11

## 2023-01-11 PROBLEM — Z3A.30 30 WEEKS GESTATION OF PREGNANCY: Status: RESOLVED | Noted: 2022-10-17 | Resolved: 2023-01-11

## 2023-01-11 NOTE — PROGRESS NOTES
A fetal ultrasound was completed  See Ob procedures in Epic for an interpretation and recommendations  Do not hesitate to contact us in [facility] if you have questions  Pearline Bers Sherren Flicker MD, [address] [facility]  Maternal Fetal Medicine

## 2023-01-12 ENCOUNTER — TELEMEDICINE (OUTPATIENT)
Facility: HOSPITAL | Age: 28
End: 2023-01-12

## 2023-01-12 DIAGNOSIS — O99.810 ABNORMAL GLUCOSE AFFECTING PREGNANCY: ICD-10-CM

## 2023-01-12 DIAGNOSIS — E66.3 OVERWEIGHT WITH BODY MASS INDEX (BMI) OF 27 TO 27.9 IN ADULT: ICD-10-CM

## 2023-01-12 DIAGNOSIS — Z3A.32 32 WEEKS GESTATION OF PREGNANCY: Primary | ICD-10-CM

## 2023-01-12 NOTE — PROGRESS NOTES
Virtual Regular Visit    Verification of patient location:    Patient is located in the following state in which I hold an active license [place]      Assessment/Plan:    Problem List Items Addressed This Visit        Other    32 weeks gestation of pregnancy - Primary   Other Visit Diagnoses     Overweight with body mass index (BMI) of 27 to 27 9 in adult        Abnormal glucose affecting pregnancy                   Reason for visit is   Chief Complaint   Patient presents with   • Patient Education   • Virtual Regular Visit        Encounter provider Hua Fuentes    Provider located at [facility] [id number]      Recent Visits  Date Type Provider Dept   23 Telephone Hua Willams    Showing recent visits within past 7 days and meeting all other requirements  Today's Visits  Date Type Provider Dept   23 Telemedicine Hua Willams    Showing today's visits and meeting all other requirements  Future Appointments  No visits were found meeting these conditions  Showing future appointments within next 150 days and meeting all other requirements       The patient was identified by name and date of birth  Crystal Montez was informed that this is a telemedicine visit and that the visit is being conducted through the [address] Now platform  She agrees to proceed     My office door was closed  No one else was in the room  She acknowledged consent and understanding of privacy and security of the video platform  The patient has agreed to participate and understands they can discontinue the visit at any time  Patient is aware this is a billable service  Subjective  Crystal Montez is a 28 y  o pregnant female    HPI     Past Medical History:   Diagnosis Date   • Migraine     occas     • Varicella     had vaccines       Past Surgical History:   Procedure Laterality Date   • INDUCED       [facility]       Current Outpatient Medications   Medication Sig Dispense Refill   • Acetaminophen (TYLENOL 8 HOUR PO) Take by mouth     • Blood Glucose Monitoring Suppl (OneTouch Verio Flex System) w/Device KIT Test 4 times daily (Patient not taking: Reported on 1/10/2023) 1 kit 0   • ferrous sulfate 325 (65 Fe) mg tablet Take 325 mg by mouth daily with breakfast     • OneTouch Delica Lancets 74C MISC Test 4 times daily (Patient not taking: Reported on 1/10/2023) 100 each 4   • OneTouch Verio test strip Test 4 times daily (Patient not taking: Reported on 1/10/2023) 100 strip 4   • Prenatal Vit-Fe Fumarate-FA (PRENATAL VITAMIN PO) Take by mouth       No current facility-administered medications for this visit  No Known Allergies    Review of Systems    Video Exam    There were no vitals filed for this visit  Physical Exam     I spent 25 minutes with patient today in which greater than 50% of the time was spent in counseling/coordination of care regarding glucose tolerance results and patient education  Reviewed Dr Caleb Peace OB note on 22:     Diet controlled gestational diabetes mellitus (GDM) in third trimester        Had elevated 1h GCT and elevated fasting on 3h GTT  She states she ate at 1am  (~7 hrs prior to fasting test) - discussed could be reason for elevation or could be truly elevated  Option to repeat 3h GTT vs just check fingersticks for a week discussed - she has appointment tomorrow with the diabetic educator at [place] [address]  She will likely just keep this appointment and check fingersticks for one week  Given above information, if all fingersticks normal after one week, could likely discontinue     No results found for: HGBA1C        Patient completed Class 1 education on 22 following GDM referral    Noted 1/10/23 US report: fetal growth and FARSHAD normal    Spoke with patient during virtual appointment today scheduled for class 2   Patient reported she did not  her prescribed OneTouch Verio glucose meter  Patient is no longer covered under [employer]  Patient stated she now has her Access card and is in the process of choosing her insurance  Patient stated she does not want to complete fingersticks for 1 week to rule out GDM  Patient prefers to repeat 3 hr GTT to screen for GDM  Reviewed 3 hr GTT instructions and recommendations for fasting at least 8 hrs to no more than 10 hrs overnight prior to test  In-basket message sent to Dr Devyn Cantrell and also Dr Rico Evans who patient has an OB appointment scheduled with tomorrow 1/13/23  Patient is requesting another 3 hr GTT be ordered  Patient has a prepregnancy BMI 27 83 and was advised to maintain weight since IOM guidelines for overweight pre-pregnancy gain 15-25 pounds  Patient has gained 29 pounds to date  Patient reported eating healthier now and has stopped drinking fruit juice and soda  Mainly drinking water and some tea with a very small amount of honey at night  Avoids desserts  Patient continues to eat 3 meals and 3 snacks incorporating carbohydrate paired with protein  Pasta,rice and potatoes portions are no larger than 1 cup  She is eating more vegetables and salads  Snacks between meals help overeating at meals        Await 3 hr GTT results and need to reschedule class 2

## 2023-01-13 ENCOUNTER — TELEPHONE (OUTPATIENT)
Dept: OBGYN CLINIC | Facility: CLINIC | Age: 28
End: 2023-01-13

## 2023-01-13 ENCOUNTER — ROUTINE PRENATAL (OUTPATIENT)
Dept: OBGYN CLINIC | Facility: CLINIC | Age: 28
End: 2023-01-13

## 2023-01-13 ENCOUNTER — DOCUMENTATION (OUTPATIENT)
Facility: HOSPITAL | Age: 28
End: 2023-01-13

## 2023-01-13 VITALS — BODY MASS INDEX: 32.24 KG/M2 | SYSTOLIC BLOOD PRESSURE: 118 MMHG | DIASTOLIC BLOOD PRESSURE: 74 MMHG | WEIGHT: 202.8 LBS

## 2023-01-13 DIAGNOSIS — Z3A.32 32 WEEKS GESTATION OF PREGNANCY: ICD-10-CM

## 2023-01-13 DIAGNOSIS — Z34.83 PRENATAL CARE, SUBSEQUENT PREGNANCY, THIRD TRIMESTER: Primary | ICD-10-CM

## 2023-01-13 DIAGNOSIS — R73.09 ELEVATED GLUCOSE: Primary | ICD-10-CM

## 2023-01-13 DIAGNOSIS — L29.2 VULVAR ITCHING: ICD-10-CM

## 2023-01-13 DIAGNOSIS — Z30.09 STERILIZATION CONSULT: ICD-10-CM

## 2023-01-13 LAB
SL AMB  POCT GLUCOSE, UA: ABNORMAL
SL AMB POCT URINE PROTEIN: 1

## 2023-01-13 RX ORDER — NYSTATIN AND TRIAMCINOLONE ACETONIDE 100000; 1 [USP'U]/G; MG/G
OINTMENT TOPICAL 2 TIMES DAILY
Qty: 30 G | Refills: 0 | Status: SHIPPED | OUTPATIENT
Start: 2023-01-13

## 2023-01-13 NOTE — ASSESSMENT & PLAN NOTE
Labs overall up to date  Will be going for repeat 3 hour GTT tomorrow, she did not do accuchecks, and was not truly fasting on her first attempt  Baby is very active  No bleeding, LOF, contractions  Some slight vulvar itching  No discharge

## 2023-01-13 NOTE — TELEPHONE ENCOUNTER
----- Message from Natalie García MD sent at 1/12/2023  6:11 PM EST -----  I think it is very appropriate to repeat the 3h GTT if she has not picked up a meter yet just to make sure we have the correct diagnosis  I copied our clinical team on this so they can reorder the test and call her to confirm when she plans to go  Thanks for the update!!    Dr Hetal Baker    ----- Message -----  From: Chela Petit  Sent: 1/12/2023  12:43 PM EST  To: Natalie García MD, #    Hi Dr Unique Salmeron and Dr Hetal Baker,  I just wanted to fill you both in on [place] [facility] has an OB appointment tomorrow 1/13/23 with Dr Unique Salmeron  Patient had an elevated fasting on the 3 hr GTT which diagnosed her with GDM and a referral was sent  Dr Hetal Baker you saw Jesus Cantu on 12/27/22 and patient reported she was not fasting when she completed the 3 hr GTT  Testing for 1 week was discussed and patient completed diabetes education on 12/28  She was in a group class as diagnosed GDM  I spoke with Crystal today  Patient has not picked up the meter due to change of insurance, she is now on Access  Jesus Cantu is now requesting that the 3 hr GTT be reordered and she does not want to do fingersticks  Please advise as I did not complete diabetes education considering she does not have diagnosis  I hope I explained this ok if not please call diabetes education at [phone] for assistance?   Thank you   Kalani Turner

## 2023-01-13 NOTE — PROGRESS NOTES
Problem List Items Addressed This Visit        Other    32 weeks gestation of pregnancy     Labs overall up to date  Will be going for repeat 3 hour GTT tomorrow, she did not do accuchecks, and was not truly fasting on her first attempt  Baby is very active  No bleeding, LOF, contractions  Some slight vulvar itching  No discharge  Sterilization consult     Marta Schwartz is interested in tubal ligation/bilateral salpingectomy  Discussed that this is a permanent and not reversible procedure  All questions answered and MA-31 is signed            Other Visit Diagnoses     Prenatal care, subsequent pregnancy, third trimester    -  Primary    Relevant Orders    POCT urine dip    Vulvar itching        Relevant Medications    nystatin-triamcinolone (MYCOLOG-II) ointment

## 2023-01-13 NOTE — PROGRESS NOTES
Prenatal visit   GA 32w6d  C/o Vulvar itching   Denies any bleeding or cramping   Nl FM  S/P Tdap and Flu vaccine   28 wk labs completed   Pt will do 3 hr GTT tomorrow   Delivery consent signed   Has a Breast pump   Has a Pediatrician

## 2023-01-13 NOTE — ASSESSMENT & PLAN NOTE
Tanya Tapia is interested in tubal ligation/bilateral salpingectomy  Discussed that this is a permanent and not reversible procedure  All questions answered and MA-31 is signed

## 2023-01-13 NOTE — PROGRESS NOTES
Mychart message from MD Wesley Black; Javan Ansari MD; [address]  I think it is very appropriate to repeat the 3h GTT if she has not picked up a meter yet just to make sure we have the correct diagnosis   I copied our clinical team on this so they can reorder the test and call her to confirm when she plans to go        Thanks for the update!!     Dr Jung Sloan

## 2023-01-14 ENCOUNTER — APPOINTMENT (OUTPATIENT)
Dept: LAB | Facility: HOSPITAL | Age: 28
End: 2023-01-14

## 2023-01-14 DIAGNOSIS — R73.09 ELEVATED GLUCOSE: ICD-10-CM

## 2023-01-14 LAB
GLUCOSE 1H P 100 G GLC PO SERPL-MCNC: 160 MG/DL (ref 40–?)
GLUCOSE 2H P 100 G GLC PO SERPL-MCNC: 124 MG/DL (ref 40–?)
GLUCOSE 3H P 100 G GLC PO SERPL-MCNC: 98 MG/DL (ref 40–?)
GLUCOSE P FAST SERPL-MCNC: 91 MG/DL (ref 65–94)

## 2023-01-19 ENCOUNTER — TELEPHONE (OUTPATIENT)
Dept: OBGYN CLINIC | Facility: CLINIC | Age: 28
End: 2023-01-19

## 2023-01-19 NOTE — TELEPHONE ENCOUNTER
Patient asked if a note with her due date could be faxed over to the [facility] office - [phone]

## 2023-01-23 ENCOUNTER — DOCUMENTATION (OUTPATIENT)
Dept: HEMATOLOGY ONCOLOGY | Facility: CLINIC | Age: 28
End: 2023-01-23

## 2023-01-25 ENCOUNTER — ROUTINE PRENATAL (OUTPATIENT)
Dept: OBGYN CLINIC | Facility: CLINIC | Age: 28
End: 2023-01-25

## 2023-01-25 VITALS — BODY MASS INDEX: 31.99 KG/M2 | WEIGHT: 201.2 LBS | DIASTOLIC BLOOD PRESSURE: 68 MMHG | SYSTOLIC BLOOD PRESSURE: 94 MMHG

## 2023-01-25 DIAGNOSIS — Z3A.34 34 WEEKS GESTATION OF PREGNANCY: ICD-10-CM

## 2023-01-25 DIAGNOSIS — O99.810 ABNORMAL GLUCOSE AFFECTING PREGNANCY: ICD-10-CM

## 2023-01-25 DIAGNOSIS — O99.013 ANEMIA AFFECTING PREGNANCY IN THIRD TRIMESTER: ICD-10-CM

## 2023-01-25 DIAGNOSIS — Z34.83 PRENATAL CARE, SUBSEQUENT PREGNANCY, THIRD TRIMESTER: Primary | ICD-10-CM

## 2023-01-25 DIAGNOSIS — O99.891 BACK PAIN AFFECTING PREGNANCY IN THIRD TRIMESTER: ICD-10-CM

## 2023-01-25 DIAGNOSIS — M54.9 BACK PAIN AFFECTING PREGNANCY IN THIRD TRIMESTER: ICD-10-CM

## 2023-01-25 DIAGNOSIS — O99.012 ANEMIA AFFECTING PREGNANCY IN SECOND TRIMESTER: ICD-10-CM

## 2023-01-25 LAB
SL AMB  POCT GLUCOSE, UA: NORMAL
SL AMB POCT URINE PROTEIN: NORMAL

## 2023-01-25 NOTE — ASSESSMENT & PLAN NOTE
Elevated 1 hr, and elevated 3 hr GTT, however, was not fasting for the 3hr  Initially opted for finger stick testing, but failed to complete   Repeat 3hr 1/14/23 normal

## 2023-01-25 NOTE — PROGRESS NOTES
Problem List Items Addressed This Visit        Other    Anemia affecting pregnancy in third trimester    Back pain affecting pregnancy     Continues with pain  Mostly responsive to tylenol  Is mild w/o red flag sx  Reviewed supportive therapies  Declines referral to pelvic floor PT but will call if desires this going forward  Abnormal glucose affecting pregnancy     Elevated 1 hr, and elevated 3 hr GTT, however, was not fasting for the 3hr  Initially opted for finger stick testing, but failed to complete  Repeat 3hr 1/14/23 normal           34 weeks gestation of pregnancy     Rosa Maria  is a 28 y o  [id number] @34w4d who presents for routine prenatal visit  Growth scan 1/10/23 - normal growth and fluid  F/u as clinically indicated  S/p TDAP and flu  GBS - at next visit   Plans to breastfeed  Pump - ordered today   Perineal massage - reviewed   Ped - has     Reports good fetal movement  Denies LOF, vaginal bleeding, regular uterine contractions, cramping, headaches or visual changes  Reviewed labor precautions and FKC            Other Visit Diagnoses     Prenatal care, subsequent pregnancy, third trimester    -  Primary    Relevant Orders    POCT urine dip (Completed)

## 2023-01-25 NOTE — PROGRESS NOTES
Patient here for PN visit  She c/o back and right leg pain for the past week  She denies spotting, LOF or cramping  Good fetal movement  Up to date with flu and tdap vaccines  Urine neg for protein and glucose  Breast pump order placed

## 2023-01-25 NOTE — ASSESSMENT & PLAN NOTE
Continues with pain  Mostly responsive to tylenol  Is mild w/o red flag sx  Reviewed supportive therapies  Declines referral to pelvic floor PT but will call if desires this going forward

## 2023-01-25 NOTE — ASSESSMENT & PLAN NOTE
Yash Shea  is a 28 y o  [id number] @34w4d who presents for routine prenatal visit  Growth scan 1/10/23 - normal growth and fluid  F/u as clinically indicated  S/p TDAP and flu  GBS - at next visit   Plans to breastfeed  Pump - ordered today   Perineal massage - reviewed   Ped - has     Reports good fetal movement  Denies LOF, vaginal bleeding, regular uterine contractions, cramping, headaches or visual changes  Reviewed labor precautions and FKC

## 2023-01-28 LAB
DME PARACHUTE DELIVERY DATE REQUESTED: NORMAL
DME PARACHUTE ITEM DESCRIPTION: NORMAL
DME PARACHUTE ORDER STATUS: NORMAL
DME PARACHUTE SUPPLIER NAME: NORMAL
DME PARACHUTE SUPPLIER PHONE: NORMAL

## 2023-02-03 PROBLEM — Z3A.36 36 WEEKS GESTATION OF PREGNANCY: Status: ACTIVE | Noted: 2023-01-11

## 2023-02-03 NOTE — ASSESSMENT & PLAN NOTE
11/19 H/H 9 0/28 9<277  12/17 H/H 9 8/32 8 <255  Taking oral iron together with PNV  Encouraged to separate approx 8 hours from PNV for greater absorption  Iron infusions were not covered by insurance  CBC ordered today

## 2023-02-03 NOTE — ASSESSMENT & PLAN NOTE
Aleyda Mcconnell is a 28 y o  [id number] here for OB visit at 36w2d weeks  TWG 11 8 kg (26 lb)  Denies LOF, vaginal bleeding or contractions  Performing FKC's daily 10 in 2 hours  Anemia- see separate diagnosis  Back pain- see separate diagnosis  Perineal massage encouraged  GBS collected today  TDAP and influenza vaccine up to date  Covid declined  Aware of recommendations  "It's a girl" She will be named Lele Garcia or [facility]     RTO 1 week

## 2023-02-06 ENCOUNTER — TELEPHONE (OUTPATIENT)
Dept: OBGYN CLINIC | Facility: CLINIC | Age: 28
End: 2023-02-06

## 2023-02-06 ENCOUNTER — ROUTINE PRENATAL (OUTPATIENT)
Dept: OBGYN CLINIC | Facility: CLINIC | Age: 28
End: 2023-02-06

## 2023-02-06 VITALS
WEIGHT: 201 LBS | DIASTOLIC BLOOD PRESSURE: 60 MMHG | BODY MASS INDEX: 33.49 KG/M2 | SYSTOLIC BLOOD PRESSURE: 100 MMHG | HEIGHT: 65 IN

## 2023-02-06 DIAGNOSIS — M54.9 BACK PAIN AFFECTING PREGNANCY IN THIRD TRIMESTER: ICD-10-CM

## 2023-02-06 DIAGNOSIS — O99.891 BACK PAIN AFFECTING PREGNANCY IN THIRD TRIMESTER: ICD-10-CM

## 2023-02-06 DIAGNOSIS — Z3A.36 36 WEEKS GESTATION OF PREGNANCY: ICD-10-CM

## 2023-02-06 DIAGNOSIS — O99.810 ABNORMAL GLUCOSE AFFECTING PREGNANCY: ICD-10-CM

## 2023-02-06 DIAGNOSIS — O99.013 ANEMIA AFFECTING PREGNANCY IN THIRD TRIMESTER: ICD-10-CM

## 2023-02-06 DIAGNOSIS — Z34.83 PRENATAL CARE, SUBSEQUENT PREGNANCY, THIRD TRIMESTER: Primary | ICD-10-CM

## 2023-02-06 LAB
SL AMB  POCT GLUCOSE, UA: NEGATIVE
SL AMB POCT URINE PROTEIN: NEGATIVE

## 2023-02-06 NOTE — PROGRESS NOTES
Problem   36 Weeks Gestation of Pregnancy   Abnormal Glucose Affecting Pregnancy    Normal repeat 3 hr       Back Pain Affecting Pregnancy   Anemia Affecting Pregnancy in Third Trimester    11/18/22- hgb9  0/Hct28 9 IV iron transfusions ordered       36 weeks gestation of pregnancy  Fabio Landeros is a 28 y o  [id number] here for OB visit at 36w2d weeks  TWG 11 8 kg (26 lb)  Denies LOF, vaginal bleeding or contractions  Performing FKC's daily 10 in 2 hours  Anemia- see separate diagnosis  Back pain- see separate diagnosis  Perineal massage encouraged  GBS collected today  TDAP and influenza vaccine up to date  Covid declined  Aware of recommendations  "It's a girl" She will be named [facility] or [facility]  RTO 1 week    Anemia affecting pregnancy in third trimester  11/19 H/H 9 0/28 9<277  12/17 H/H 9 8/32 8 <255  Taking oral iron together with PNV  Encouraged to separate approx 8 hours from PNV for greater absorption  Iron infusions were not covered by insurance  CBC ordered today  Abnormal glucose affecting pregnancy  Normal GTT 1/14/23    Back pain affecting pregnancy  C/o lower and mid upper back pain persists  Tylenol taken when needed  Rates pain 10/10  Agreeable to PT referral  Does carry her 3 yo as needed  Heating pad is effective for pain relief  Standing pelvic tilt demonstrated

## 2023-02-06 NOTE — TELEPHONE ENCOUNTER
Received fax from [address] [place] [address] for a written order  This was placed via [employer]  Activity feed has conflicting documentation  1/25" Mom contacted , order placed " 1/27, "Waiting mom response, pending selection  " Crystal states she did select pump via [employer]

## 2023-02-06 NOTE — ASSESSMENT & PLAN NOTE
C/o lower and mid upper back pain persists  Tylenol taken when needed  Rates pain 10/10  Agreeable to PT referral  Does carry her 3 yo as needed  Heating pad is effective for pain relief  Standing pelvic tilt demonstrated

## 2023-02-06 NOTE — PATIENT INSTRUCTIONS
Kick Counts in Pregnancy   WHAT YOU NEED TO KNOW:   Kick counts measure how much your baby is moving in your womb  A kick from your baby can be felt as a twist, turn, swish, roll, or jab  It is common to feel your baby kicking at 26 to 28 weeks of pregnancy  You may feel your baby kick as early as 20 weeks of pregnancy  You may want to start counting at 28 weeks  DISCHARGE INSTRUCTIONS:   Contact your doctor immediately if:   You feel a change in the number of kicks or movements of your baby  You feel fewer than 10 kicks within 2 hours  You have questions or concerns about your baby's movements  Why measure kick counts:  Your baby's movement may provide information about your baby's health  He or she may move less, or not at all, if there are problems  Your baby may move less if he or she is not getting enough oxygen or nutrition from the placenta  Do not smoke while you are pregnant  Smoking decreases the amount of oxygen that gets to your baby  Talk to your healthcare provider if you need help to quit smoking  Tell your healthcare provider as soon as you feel a change in your baby's movements  When to measure kick counts:   Measure kick counts at the same time every day  Measure kick counts when your baby is awake and most active  Your baby may be most active in the evening  How to measure kick counts:  Check that your baby is awake before you measure kick counts  You can wake up your baby by lightly pushing on your belly, walking, or drinking something cold  Your healthcare provider may tell you different ways to measure kick counts  You may be told to do the following:  Use a chart or clock to keep track of the time you start and finish counting  Sit in a chair or lie on your left side  Place your hands on the largest part of your belly  Count until you reach 10 kicks  Write down how much time it takes to count 10 kicks  It may take 30 minutes to 2 hours to count 10 kicks  It should not take more than 2 hours to count 10 kicks  Follow up with your doctor as directed:  Write down your questions so you remember to ask them during your visits  © Copyright [employer] 2022 Information is for End User's use only and may not be sold, redistributed or otherwise used for commercial purposes  All illustrations and images included in CareNotes® are the copyrighted property of A D A M , Inc  or [facility]   The above information is an  only  It is not intended as medical advice for individual conditions or treatments  Talk to your doctor, nurse or pharmacist before following any medical regimen to see if it is safe and effective for you

## 2023-02-08 LAB — GP B STREP DNA SPEC QL NAA+PROBE: NEGATIVE

## 2023-02-11 ENCOUNTER — APPOINTMENT (OUTPATIENT)
Dept: LAB | Facility: HOSPITAL | Age: 28
End: 2023-02-11

## 2023-02-11 DIAGNOSIS — O99.013 ANEMIA AFFECTING PREGNANCY IN THIRD TRIMESTER: ICD-10-CM

## 2023-02-11 DIAGNOSIS — Z3A.36 36 WEEKS GESTATION OF PREGNANCY: ICD-10-CM

## 2023-02-11 LAB
BASOPHILS # BLD AUTO: 0.03 THOUSANDS/ÂΜL (ref 0–0.1)
BASOPHILS NFR BLD AUTO: 0 % (ref 0–1)
EOSINOPHIL # BLD AUTO: 0.15 THOUSAND/ÂΜL (ref 0–0.61)
EOSINOPHIL NFR BLD AUTO: 2 % (ref 0–6)
ERYTHROCYTE [DISTWIDTH] IN BLOOD BY AUTOMATED COUNT: 14.9 % (ref 11.6–15.1)
HCT VFR BLD AUTO: 32.5 % (ref 34.8–46.1)
HGB BLD-MCNC: 10.3 G/DL (ref 11.5–15.4)
IMM GRANULOCYTES # BLD AUTO: 0.05 THOUSAND/UL (ref 0–0.2)
IMM GRANULOCYTES NFR BLD AUTO: 1 % (ref 0–2)
LYMPHOCYTES # BLD AUTO: 1.61 THOUSANDS/ÂΜL (ref 0.6–4.47)
LYMPHOCYTES NFR BLD AUTO: 19 % (ref 14–44)
MCH RBC QN AUTO: 27.5 PG (ref 26.8–34.3)
MCHC RBC AUTO-ENTMCNC: 31.7 G/DL (ref 31.4–37.4)
MCV RBC AUTO: 87 FL (ref 82–98)
MONOCYTES # BLD AUTO: 0.64 THOUSAND/ÂΜL (ref 0.17–1.22)
MONOCYTES NFR BLD AUTO: 8 % (ref 4–12)
NEUTROPHILS # BLD AUTO: 5.83 THOUSANDS/ÂΜL (ref 1.85–7.62)
NEUTS SEG NFR BLD AUTO: 70 % (ref 43–75)
NRBC BLD AUTO-RTO: 0 /100 WBCS
PLATELET # BLD AUTO: 267 THOUSANDS/UL (ref 149–390)
PMV BLD AUTO: 9.4 FL (ref 8.9–12.7)
RBC # BLD AUTO: 3.74 MILLION/UL (ref 3.81–5.12)
WBC # BLD AUTO: 8.31 THOUSAND/UL (ref 4.31–10.16)

## 2023-02-13 NOTE — PROGRESS NOTES
PT Evaluation     Today's date: 2023  Patient name: Zackery Cordon  : 1995  MRN: [id number]  Referring provider: ROSA Miner  Dx:   Encounter Diagnosis     ICD-10-CM    1  36 weeks gestation of pregnancy  Z3A 36 Ambulatory referral to Physical Therapy      2  Back pain affecting pregnancy in third trimester  O99 891 Ambulatory referral to Physical Therapy    M54 9           Start Time: 1100  Stop Time: 1145  Total time in clinic (min): 45 minutes    Assessment  Assessment details: Patient is a 28 y o  female 37 weeks pregnant with c/o of back pain  Patient's symptoms limit her with prolonged walking and sleeping  On exam patient presents with decreased spinal mobility and impaired posture that may be contributing to her pain  Patient will benefit from skilled PT to address the above impairments to improve her comfort during her pregnancy  Impairments: abnormal gait, abnormal or restricted ROM, impaired physical strength and lacks appropriate home exercise program    Symptom irritability: moderateUnderstanding of Dx/Px/POC: good   Prognosis: good    Goals  Within 2 weeks or by discharge,   1  Patient will demonstrate independence with HEP to manage her sx  2  Patient will verbalize knowledge of various laboring positions to encourage laboring         Plan  Patient would benefit from: skilled physical therapy  Planned modality interventions: cryotherapy and thermotherapy: hydrocollator packs  Planned therapy interventions: abdominal trunk stabilization, joint mobilization, manual therapy, activity modification, balance, balance/weight bearing training, neuromuscular re-education, body mechanics training, postural training, patient education, therapeutic activities, therapeutic exercise, functional ROM exercises, strengthening, stretching, transfer training, gait training, home exercise program and breathing training  Frequency: 1x week  Plan of Care beginning date: 2023  Plan of Care expiration date: 3/7/2023  Treatment plan discussed with: patient and referring physician        PT Pelvic Floor Subjective:   History of Present Illness:   Patient is 37 weeks pregnant, JACKLYN 3/4/23  Birth plan: potential induction at 44 weeks    Feels back pain from her thoracic to her lower back  Feels it mainly at night time when she is sleeping and with walking  Patient is a side sleeper, uses a pregnancy pillow and uses pillows behind her  With walking, patient can walk for about 5-10 minutes until her back pain stops her  Also difficulty with stairs at home  Social Support:     Lives in:  Multiple-level home    Lives with:  Krys fox    Work status: unemployed    History of Depression: no  Diet and Exercise:    Diet:balanced nutrition    stretching    Wants to be able to walk more  Used to go to the gym prior to pregnancy - treadmill/elliptical/squats    Not exercising due to: pain  OB/ gyn History    Gestational History:     Number of prior pregnancies: 3    Number of term pregnancies: 2    Delivery Type: vaginal delivery      Number of vaginal deliveries: 2    Delivery Complications:  1st birth - induction at 41 weeks, 18 hours labor, baby was 9 lbs, epidural, had some tearing  Recovery was fine  2nd birth - pushed for 30-40 minutes, smaller baby 6 lbs, epidural  Recovery was fine  Bladder Function:     Voiding Difficulties positive for: frequent urination      Voiding Difficulties negative for: straining      Voiding Difficulties comments:     Voiding frequency: every 1-2 hours    Urinary leakage: urine leakage    Urinary leakage aggravated by: sneezing    Urinary leakage not aggravated by: bending    Nocturia (episodes per night): 2    Painful urination: No      Fluid Intake Type: Water    Intake (ounces): Intake (ounces) comment: Water bottle x 6-7, 16 oz?   Bowel Function:     Voiding DIfficulties: painful defecating and constipation      Bowel frequency: every 2 days    Stool softener use: no stool softeners    Enema use: no enema    Uses "squatty potty": Squatty Potty  Sexual Function:     Sexually Active:  Not sexually active    Pain during intercourse: No    Pain:     Current pain ratin    At best pain ratin    At worst pain rating:  10    Location:  Thoracic to lumbar    Aggravating factors:  Walking  Diagnostic Tests:     None        Objective     Static Posture     Pelvis   Anterior pelvic tilt             Precautions: pregnant                 Vitals 124/64  HR 83  SpO2 93          Patient Ed           Bowel management Relax/exhale when pushing          Perineal massage NV          Laboring positions NV          Maternity support belts Educated          Neuro Re-Ed                                            Ther Ex           Aerobic conditioning           Pelvic tilts 10x          TrA Hug the baby  10x          Open book 10x          Cat-cow 10x                                           Ther Activity                                 Manual                                                                  Modalities

## 2023-02-14 ENCOUNTER — ROUTINE PRENATAL (OUTPATIENT)
Dept: OBGYN CLINIC | Facility: CLINIC | Age: 28
End: 2023-02-14

## 2023-02-14 ENCOUNTER — EVALUATION (OUTPATIENT)
Dept: PHYSICAL THERAPY | Facility: REHABILITATION | Age: 28
End: 2023-02-14

## 2023-02-14 VITALS — DIASTOLIC BLOOD PRESSURE: 70 MMHG | SYSTOLIC BLOOD PRESSURE: 120 MMHG | BODY MASS INDEX: 34.35 KG/M2 | WEIGHT: 206.4 LBS

## 2023-02-14 DIAGNOSIS — M54.9 BACK PAIN AFFECTING PREGNANCY IN THIRD TRIMESTER: ICD-10-CM

## 2023-02-14 DIAGNOSIS — O99.891 BACK PAIN AFFECTING PREGNANCY IN THIRD TRIMESTER: ICD-10-CM

## 2023-02-14 DIAGNOSIS — Z3A.37 37 WEEKS GESTATION OF PREGNANCY: ICD-10-CM

## 2023-02-14 DIAGNOSIS — Z3A.36 36 WEEKS GESTATION OF PREGNANCY: ICD-10-CM

## 2023-02-14 DIAGNOSIS — Z34.83 ENCOUNTER FOR SUPERVISION OF OTHER NORMAL PREGNANCY, THIRD TRIMESTER: ICD-10-CM

## 2023-02-14 DIAGNOSIS — O99.013 ANEMIA AFFECTING PREGNANCY IN THIRD TRIMESTER: Primary | ICD-10-CM

## 2023-02-14 LAB
SL AMB  POCT GLUCOSE, UA: NORMAL
SL AMB POCT URINE PROTEIN: NORMAL

## 2023-02-14 NOTE — ASSESSMENT & PLAN NOTE
Delvis Cochran  is a 28 y o  [id number] @37w3d who presents for routine prenatal visit  GBS negative   ARRIVE trial discussed  Pt might be interested in 44 eIOL  She will take the week to consider this and let provider know next week if interested for check and submit request    Reports good fetal movement  Denies LOF, vaginal bleeding, regular uterine contractions, cramping, headaches or visual changes  Reviewed PTL/Labor precautions and FKC

## 2023-02-14 NOTE — ASSESSMENT & PLAN NOTE
Taking PO iron  Repeat CBC on 2/11 showed improvement in Hgb with PO iron   Hgb now 10 3  Continue PO iron

## 2023-02-14 NOTE — PROGRESS NOTES
37 weeks gestation of pregnancy  Rosa Maria  is a 28 y o  [id number] @37w3d who presents for routine prenatal visit  GBS negative   ARRIVE trial discussed  Pt might be interested in 44 eIOL  She will take the week to consider this and let provider know next week if interested for check and submit request    Reports good fetal movement  Denies LOF, vaginal bleeding, regular uterine contractions, cramping, headaches or visual changes  Reviewed PTL/Labor precautions and FKC  Back pain affecting pregnancy  Seeing PT today for consult    Anemia affecting pregnancy in third trimester  Taking PO iron  Repeat CBC on 2/11 showed improvement in Hgb with PO iron   Hgb now 10 3  Continue PO iron

## 2023-02-16 NOTE — PROGRESS NOTES
Daily Note     Today's date: 2023  Patient name: Sb Chacon  : 1995  MRN: [id number]  Referring provider: ROSA Rowland  Dx:   Encounter Diagnosis     ICD-10-CM    1  Back pain affecting pregnancy in third trimester  O99 891     M54 9           Start Time: 1630  Stop Time: 3104  Total time in clinic (min): 45 minutes      Subjective: Trying to walk more  She is 2 cm dilated and is scheduled for induction but hopes to go naturally  Objective: See treatment diary below      Assessment: Patient is 38 weeks pregnant  Patient demonstrating good understanding of HEP  Today focused on labor preparation with education on perineal massage and laboring positions  Patient distributed handouts for these topics as well  Patient able to trial different laboring positions with use of physioball and peanut ball pending her comfort  Patient leaving PT with more awareness of laboring positions that encourage labor progression  Will keep patient's chart open in case patient requires more PT prior to birth  Patient in agreement with plan  Plan: Continue per plan of care        Precautions: pregnant                Vitals 124/64  HR 83  SpO2 93          Patient Ed           Bowel management Relax/exhale when pushing          Perineal massage NV Educated and handout distributed         Laboring positions NV Trialed used of pball, peanut ball and various laboring positions         Maternity support belts Educated          Neuro Re-Ed                                            Ther Ex           Aerobic conditioning           Pelvic tilts 10x          TrA Hug the baby  10x          Open book 10x          Cat-cow 10x 15x         Thread the needle  10x ea                               Ther Activity                                 Manual                                                                  Modalities

## 2023-02-20 ENCOUNTER — TELEPHONE (OUTPATIENT)
Dept: OBGYN CLINIC | Facility: CLINIC | Age: 28
End: 2023-02-20

## 2023-02-20 ENCOUNTER — ROUTINE PRENATAL (OUTPATIENT)
Dept: OBGYN CLINIC | Facility: CLINIC | Age: 28
End: 2023-02-20

## 2023-02-20 ENCOUNTER — OFFICE VISIT (OUTPATIENT)
Dept: PHYSICAL THERAPY | Facility: REHABILITATION | Age: 28
End: 2023-02-20

## 2023-02-20 VITALS
HEIGHT: 65 IN | WEIGHT: 209 LBS | SYSTOLIC BLOOD PRESSURE: 110 MMHG | DIASTOLIC BLOOD PRESSURE: 68 MMHG | BODY MASS INDEX: 34.82 KG/M2

## 2023-02-20 DIAGNOSIS — O99.810 ABNORMAL GLUCOSE AFFECTING PREGNANCY: ICD-10-CM

## 2023-02-20 DIAGNOSIS — O99.891 BACK PAIN AFFECTING PREGNANCY IN THIRD TRIMESTER: Primary | ICD-10-CM

## 2023-02-20 DIAGNOSIS — Z3A.38 38 WEEKS GESTATION OF PREGNANCY: Primary | ICD-10-CM

## 2023-02-20 DIAGNOSIS — O99.013 ANEMIA AFFECTING PREGNANCY IN THIRD TRIMESTER: ICD-10-CM

## 2023-02-20 DIAGNOSIS — O99.891 BACK PAIN AFFECTING PREGNANCY IN THIRD TRIMESTER: ICD-10-CM

## 2023-02-20 DIAGNOSIS — M54.9 BACK PAIN AFFECTING PREGNANCY IN THIRD TRIMESTER: Primary | ICD-10-CM

## 2023-02-20 DIAGNOSIS — M54.9 BACK PAIN AFFECTING PREGNANCY IN THIRD TRIMESTER: ICD-10-CM

## 2023-02-20 PROBLEM — Z30.9 CONTRACEPTION MANAGEMENT: Status: RESOLVED | Noted: 2022-10-18 | Resolved: 2023-02-20

## 2023-02-20 LAB
SL AMB  POCT GLUCOSE, UA: ABNORMAL
SL AMB POCT URINE PROTEIN: ABNORMAL

## 2023-02-20 NOTE — ASSESSMENT & PLAN NOTE
Did not do IV iron due to insurance issues  Changed diet and doing home remedies for iron supplementation  H/H now improved- up to 10 3 /32 5

## 2023-02-20 NOTE — ASSESSMENT & PLAN NOTE
GBS results reviewed with patient  Cervix Was checked today      Reviewed benefits of perineal massage - to be done 1-4 times per week x 5-10 minutes- education in AVS given  I have reviewed the signs and symptoms of labor with the patient, including contractions q4-5 minutes for greater than 1 hour, vaginal bleeding, leaking fluid and decreased fetal movement  Reviewed process for [address]  I have emphasized the continued importance of paying close attention to the baby's movements  I have instructed the patient to call the office with any of the above symptoms prior to coming to the hospital      Signs of pre-eclampsia were also reviewed with the patient: headache, visual changes, sudden increased edema and/or severe right upper quadrant pain  This patient is a potential candidate for an elective IOL at 39 weeks  Reviewed results of ARRIVE trial which showed that when electively induced nulliparous or multiparous women are compared with expectantly managed women, there is no evidence that elective induction is associated with increased risk of   Rick Peabody is interested in an elective IOL but not before JACKLYN  Alexandra Nixon Discussed indication for induction such as elective (> or equal to 39 weeks), medical/obstetric indications and alternatives which is to await spontaneous labor  Discussed cervical ripening if cervix is unfavorable with prostaglandin (vaginal misoprostol) or mechanical dilation with insertion of balloon catheter

## 2023-02-20 NOTE — PROGRESS NOTES
Prenatal Visit  Subjective:   Tanya Tapia is a 28 y o  [id number] 38w2d here for Routine Prenatal Visit    Denies unusual vaginal discharge, LOF, VB, or ctx  Reports Fetus is active  Pregnancy: [place]    Objective:  Vitals:    02/20/23 1503   BP: 110/68     Pregravid Weight/BMI: 79 4 kg (175 lb) (BMI 29 12)  Current Weight: 94 8 kg (209 lb)   Total Weight Gain: 15 4 kg (34 lb)       OBGyn Exam  Physical Exam  Fetal Heart Rate: 140 , Fundal Height (cm): 39 cm    General: Not in acute distress and appearing well nourished and well groomed  Genitourinary:          Pelvic exam cervix is 2/thi/high   Cardiovascular:   Rate and Rhythm: Normal rate  Pulmonary:    Effort: normal, not labored  Abdominal:  Abdomen is soft and gravid    Musculoskeletal: Active movement of all extremities, no gross limitations of ROM     Edema: None  Neurological:   Mental Status: She is alert and oriented to person, place, and time  Skin: General: Skin is warm and dry  Psychiatric:    Mood and Affect: Mood normal       Behavior: Behavior normal    Assessment & Plan:       1  38 weeks gestation of pregnancy  Assessment & Plan:    GBS results reviewed with patient  Cervix Was checked today  2/th/hi    Reviewed benefits of perineal massage - to be done 1-4 times per week x 5-10 minutes- education in AVS given  I have reviewed the signs and symptoms of labor with the patient, including contractions q4-5 minutes for greater than 1 hour, vaginal bleeding, leaking fluid and decreased fetal movement  Reviewed process for [address]  I have emphasized the continued importance of paying close attention to the baby's movements  I have instructed the patient to call the office with any of the above symptoms prior to coming to the hospital      Signs of pre-eclampsia were also reviewed with the patient: headache, visual changes, sudden increased edema and/or severe right upper quadrant pain      This patient is a potential candidate for an elective IOL at 39 weeks  Reviewed results of ARRIVE trial which showed that when electively induced nulliparous or multiparous women are compared with expectantly managed women, there is no evidence that elective induction is associated with increased risk of   Bryce Petit is interested in an elective IOL but not before JACKLYN  Noé Blackman Discussed indication for induction such as elective (> or equal to 39 weeks), medical/obstetric indications and alternatives which is to await spontaneous labor  Discussed cervical ripening if cervix is unfavorable with prostaglandin (vaginal misoprostol) or mechanical dilation with insertion of balloon catheter  Orders:  -     POCT urine dip    2  Anemia affecting pregnancy in third trimester  Assessment & Plan:  Did not do IV iron due to insurance issues  Changed diet and doing home remedies for iron supplementation  H/H now improved- up to 10 3 /32 5        3  Back pain affecting pregnancy in third trimester    4   Abnormal glucose affecting pregnancy      ROSA Milligan  2023

## 2023-02-20 NOTE — TELEPHONE ENCOUNTER
----- Message from Abelino Albrecht [place] sent at 2/20/2023  3:20 PM EST -----  Regarding: IOL at 40 wks  Procedure to be scheduled (IOL or CS): IOL    JACKLYN: Estimated Date of Delivery: 3/4/23     Indication for delivery: Elective    Requested date (s) of delivery: 3/4/23   If requested date is unavailable, is there a date by which the pt must be delivered?     Physician preference: n/a    If IOL, anticipated method: needs cervical ripening

## 2023-02-27 ENCOUNTER — TELEPHONE (OUTPATIENT)
Dept: OBGYN CLINIC | Facility: CLINIC | Age: 28
End: 2023-02-27

## 2023-02-27 ENCOUNTER — ROUTINE PRENATAL (OUTPATIENT)
Dept: OBGYN CLINIC | Facility: CLINIC | Age: 28
End: 2023-02-27

## 2023-02-27 VITALS
WEIGHT: 204.8 LBS | DIASTOLIC BLOOD PRESSURE: 60 MMHG | BODY MASS INDEX: 34.12 KG/M2 | HEIGHT: 65 IN | SYSTOLIC BLOOD PRESSURE: 110 MMHG

## 2023-02-27 DIAGNOSIS — O99.891 BACK PAIN AFFECTING PREGNANCY IN THIRD TRIMESTER: ICD-10-CM

## 2023-02-27 DIAGNOSIS — O99.810 ABNORMAL GLUCOSE AFFECTING PREGNANCY: ICD-10-CM

## 2023-02-27 DIAGNOSIS — M54.9 BACK PAIN AFFECTING PREGNANCY IN THIRD TRIMESTER: ICD-10-CM

## 2023-02-27 DIAGNOSIS — Z34.83 ENCOUNTER FOR SUPERVISION OF OTHER NORMAL PREGNANCY, THIRD TRIMESTER: Primary | ICD-10-CM

## 2023-02-27 DIAGNOSIS — O99.013 ANEMIA AFFECTING PREGNANCY IN THIRD TRIMESTER: ICD-10-CM

## 2023-02-27 DIAGNOSIS — Z3A.39 39 WEEKS GESTATION OF PREGNANCY: ICD-10-CM

## 2023-02-27 LAB
SL AMB  POCT GLUCOSE, UA: NEGATIVE
SL AMB POCT URINE PROTEIN: NEGATIVE

## 2023-02-27 NOTE — ASSESSMENT & PLAN NOTE
Tony Wright  is a 28 y o  [id number] @39w2d who presents for routine prenatal visit  C/o increased white vaginal discharge  No itching, odor, burning, bleeding or fluid loss  Speculum: White discharge present, no pooling, no bleeding              -KOH/Wet mount: Negative              -Nitrazine/Ferning: Negative    GBS - negative  Desires eIOL - was called by triage for a 3pm CR slot this weekend, however, no longer requires ripening- cervix 3 cm and thick today  She is aware triage will call with new induction time when available  Plans to breastfeed  Pump - has   Ped - has  Bags packed, care seat installed  Reports good fetal movement  Denies LOF, vaginal bleeding, regular uterine contractions, cramping, headaches or visual changes  Reviewed labor precautions and FKC

## 2023-02-27 NOTE — PROGRESS NOTES
Prenatal Visit 39w2d    Patient denies any lof, vb or ctx  Patient has +fm  Patient urine is neg/neg  Tdap/Flu utd   Having a Girl  Delivery and Induction consent signed  GBS negative  Patient c/o a thick vaginal discharge  She denies any vaginal itching

## 2023-02-27 NOTE — TELEPHONE ENCOUNTER
Only iol that can be scheduled as of now is 3/5 at 3 pm  Pt is aware   Will notify [phone] and marvin

## 2023-02-27 NOTE — TELEPHONE ENCOUNTER
Pt just saw [facility] and her cervix is ripe  She will not go for iol on 3/5 at 3  I cannot get her in (as of now ) and we will call tomorrow for poss cancellation  But NO   iol on Sun at 3   Will notify TOM[facility]S and [address]    Call 2/28 for iol - no ripening needed

## 2023-02-27 NOTE — PROGRESS NOTES
Problem List Items Addressed This Visit        Other    Anemia affecting pregnancy in third trimester    Back pain affecting pregnancy    Abnormal glucose affecting pregnancy    39 weeks gestation of pregnancy     Rosa Maria  is a 28 y o  [id number] @39w2d who presents for routine prenatal visit  C/o increased white vaginal discharge  No itching, odor, burning, bleeding or fluid loss  Speculum: White discharge present, no pooling, no bleeding              -KOH/Wet mount: Negative              -Nitrazine/Ferning: Negative    GBS - negative  Desires eIOL - was called by triage for a 3pm CR slot this weekend, however, no longer requires ripening- cervix 3 cm and thick today  She is aware triage will call with new induction time when available  Plans to breastfeed  Pump - has   Ped - has  Bags packed, care seat installed  Reports good fetal movement  Denies LOF, vaginal bleeding, regular uterine contractions, cramping, headaches or visual changes  Reviewed labor precautions and FKC            Other Visit Diagnoses     Encounter for supervision of other normal pregnancy, third trimester    -  Primary    Relevant Orders    POCT urine dip (Completed)

## 2023-03-05 ENCOUNTER — HOSPITAL ENCOUNTER (OUTPATIENT)
Dept: LABOR AND DELIVERY | Facility: HOSPITAL | Age: 28
Discharge: HOME | End: 2023-03-05

## 2023-03-05 ENCOUNTER — HOSPITAL ENCOUNTER (INPATIENT)
Facility: HOSPITAL | Age: 28
LOS: 2 days | Discharge: HOME | End: 2023-03-07
Attending: OBSTETRICS & GYNECOLOGY | Admitting: OBSTETRICS & GYNECOLOGY

## 2023-03-05 DIAGNOSIS — Z3A.39 39 WEEKS GESTATION OF PREGNANCY: Primary | ICD-10-CM

## 2023-03-05 PROBLEM — Z3A.40 40 WEEKS GESTATION OF PREGNANCY: Status: ACTIVE | Noted: 2023-01-11

## 2023-03-05 LAB
ABO GROUP BLD: NORMAL
AMPHETAMINES SERPL QL SCN: NEGATIVE
BARBITURATES UR QL: NEGATIVE
BENZODIAZ UR QL: NEGATIVE
BLD GP AB SCN SERPL QL: NEGATIVE
COCAINE UR QL: NEGATIVE
ERYTHROCYTE [DISTWIDTH] IN BLOOD BY AUTOMATED COUNT: 14.6 % (ref 11.6–15.1)
HCT VFR BLD AUTO: 28.2 % (ref 34.8–46.1)
HGB BLD-MCNC: 9 G/DL (ref 11.5–15.4)
MCH RBC QN AUTO: 27.4 PG (ref 26.8–34.3)
MCHC RBC AUTO-ENTMCNC: 31.9 G/DL (ref 31.4–37.4)
MCV RBC AUTO: 86 FL (ref 82–98)
METHADONE UR QL: NEGATIVE
OPIATES UR QL SCN: NEGATIVE
OXYCODONE+OXYMORPHONE UR QL SCN: NEGATIVE
PCP UR QL: NEGATIVE
PLATELET # BLD AUTO: 260 THOUSANDS/UL (ref 149–390)
PMV BLD AUTO: 10 FL (ref 8.9–12.7)
RBC # BLD AUTO: 3.28 MILLION/UL (ref 3.81–5.12)
RH BLD: POSITIVE
SPECIMEN EXPIRATION DATE: NORMAL
THC UR QL: NEGATIVE
WBC # BLD AUTO: 11.95 THOUSAND/UL (ref 4.31–10.16)

## 2023-03-05 RX ORDER — ONDANSETRON 2 MG/ML
4 INJECTION INTRAMUSCULAR; INTRAVENOUS EVERY 6 HOURS PRN
Status: DISCONTINUED | OUTPATIENT
Start: 2023-03-05 | End: 2023-03-06

## 2023-03-05 RX ORDER — ACETAMINOPHEN 325 MG/1
650 TABLET ORAL EVERY 6 HOURS PRN
Status: DISCONTINUED | OUTPATIENT
Start: 2023-03-05 | End: 2023-03-06

## 2023-03-05 RX ORDER — SODIUM CHLORIDE, SODIUM LACTATE, POTASSIUM CHLORIDE, CALCIUM CHLORIDE 600; 310; 30; 20 MG/100ML; MG/100ML; MG/100ML; MG/100ML
125 INJECTION, SOLUTION INTRAVENOUS CONTINUOUS
Status: DISCONTINUED | OUTPATIENT
Start: 2023-03-05 | End: 2023-03-06

## 2023-03-05 RX ORDER — OXYTOCIN/RINGER'S LACTATE 30/500 ML
1-30 PLASTIC BAG, INJECTION (ML) INTRAVENOUS
Status: DISCONTINUED | OUTPATIENT
Start: 2023-03-05 | End: 2023-03-06

## 2023-03-05 RX ORDER — BUPIVACAINE HYDROCHLORIDE 2.5 MG/ML
30 INJECTION, SOLUTION EPIDURAL; INFILTRATION; INTRACAUDAL ONCE AS NEEDED
Status: DISCONTINUED | OUTPATIENT
Start: 2023-03-05 | End: 2023-03-06

## 2023-03-05 RX ORDER — CALCIUM CARBONATE 200(500)MG
1000 TABLET,CHEWABLE ORAL 2 TIMES DAILY PRN
Status: DISCONTINUED | OUTPATIENT
Start: 2023-03-05 | End: 2023-03-07 | Stop reason: HOSPADM

## 2023-03-05 RX ADMIN — SODIUM CHLORIDE, SODIUM LACTATE, POTASSIUM CHLORIDE, AND CALCIUM CHLORIDE 125 ML/HR: .6; .31; .03; .02 INJECTION, SOLUTION INTRAVENOUS at 22:05

## 2023-03-06 ENCOUNTER — ANESTHESIA EVENT (INPATIENT)
Dept: ANESTHESIOLOGY | Facility: HOSPITAL | Age: 28
End: 2023-03-06

## 2023-03-06 ENCOUNTER — ANESTHESIA (INPATIENT)
Dept: ANESTHESIOLOGY | Facility: HOSPITAL | Age: 28
End: 2023-03-06

## 2023-03-06 LAB
BASE EXCESS BLDCOA CALC-SCNC: -2.2 MMOL/L (ref 3–11)
BASE EXCESS BLDCOV CALC-SCNC: -1.7 MMOL/L (ref 1–9)
HCO3 BLDCOA-SCNC: 27.1 MMOL/L (ref 17.3–27.3)
HCO3 BLDCOV-SCNC: 23.1 MMOL/L (ref 12.2–28.6)
HOLD SPECIMEN: NORMAL
O2 CT VFR BLDCOA CALC: 8.7 ML/DL
OXYHGB MFR BLDCOA: 38.7 %
OXYHGB MFR BLDCOV: 84.9 %
PCO2 BLDCOA: 66.1 MM[HG] (ref 30–60)
PCO2 BLDCOV: 39.7 MM HG (ref 27–43)
PH BLDCOA: 7.23 [PH] (ref 7.23–7.43)
PH BLDCOV: 7.38 [PH] (ref 7.19–7.49)
PO2 BLDCOA: 20.1 MM HG (ref 5–25)
PO2 BLDCOV: 39.9 MM HG (ref 15–45)
SAO2 % BLDCOV: 19.7 ML/DL
TREPONEMA PALLIDUM IGG+IGM AB [PRESENCE] IN SERUM OR PLASMA BY IMMUNOASSAY: NORMAL

## 2023-03-06 PROCEDURE — 10907ZC DRAINAGE OF AMNIOTIC FLUID, THERAPEUTIC FROM PRODUCTS OF CONCEPTION, VIA NATURAL OR ARTIFICIAL OPENING: ICD-10-PCS | Performed by: STUDENT IN AN ORGANIZED HEALTH CARE EDUCATION/TRAINING PROGRAM

## 2023-03-06 PROCEDURE — 3E033VJ INTRODUCTION OF OTHER HORMONE INTO PERIPHERAL VEIN, PERCUTANEOUS APPROACH: ICD-10-PCS | Performed by: STUDENT IN AN ORGANIZED HEALTH CARE EDUCATION/TRAINING PROGRAM

## 2023-03-06 PROCEDURE — 4A1HXCZ MONITORING OF PRODUCTS OF CONCEPTION, CARDIAC RATE, EXTERNAL APPROACH: ICD-10-PCS | Performed by: STUDENT IN AN ORGANIZED HEALTH CARE EDUCATION/TRAINING PROGRAM

## 2023-03-06 RX ORDER — DOCUSATE SODIUM 100 MG/1
100 CAPSULE, LIQUID FILLED ORAL 2 TIMES DAILY
Status: DISCONTINUED | OUTPATIENT
Start: 2023-03-06 | End: 2023-03-07 | Stop reason: HOSPADM

## 2023-03-06 RX ORDER — ONDANSETRON 2 MG/ML
4 INJECTION INTRAMUSCULAR; INTRAVENOUS EVERY 8 HOURS PRN
Status: DISCONTINUED | OUTPATIENT
Start: 2023-03-06 | End: 2023-03-07 | Stop reason: HOSPADM

## 2023-03-06 RX ORDER — DIAPER,BRIEF,INFANT-TODD,DISP
1 EACH MISCELLANEOUS DAILY PRN
Status: DISCONTINUED | OUTPATIENT
Start: 2023-03-06 | End: 2023-03-07 | Stop reason: HOSPADM

## 2023-03-06 RX ORDER — LIDOCAINE HYDROCHLORIDE AND EPINEPHRINE 15; 5 MG/ML; UG/ML
INJECTION, SOLUTION EPIDURAL AS NEEDED
Status: DISCONTINUED | OUTPATIENT
Start: 2023-03-06 | End: 2023-03-13 | Stop reason: HOSPADM

## 2023-03-06 RX ORDER — CALCIUM CARBONATE 200(500)MG
1000 TABLET,CHEWABLE ORAL DAILY PRN
Status: DISCONTINUED | OUTPATIENT
Start: 2023-03-06 | End: 2023-03-07 | Stop reason: HOSPADM

## 2023-03-06 RX ORDER — DIPHENHYDRAMINE HCL 25 MG
25 TABLET ORAL EVERY 6 HOURS PRN
Status: DISCONTINUED | OUTPATIENT
Start: 2023-03-06 | End: 2023-03-07 | Stop reason: HOSPADM

## 2023-03-06 RX ORDER — IBUPROFEN 600 MG/1
600 TABLET ORAL EVERY 6 HOURS PRN
Status: DISCONTINUED | OUTPATIENT
Start: 2023-03-06 | End: 2023-03-07 | Stop reason: HOSPADM

## 2023-03-06 RX ORDER — ACETAMINOPHEN 325 MG/1
650 TABLET ORAL EVERY 6 HOURS PRN
Status: DISCONTINUED | OUTPATIENT
Start: 2023-03-06 | End: 2023-03-07 | Stop reason: HOSPADM

## 2023-03-06 RX ADMIN — Medication 1 APPLICATION.: at 17:08

## 2023-03-06 RX ADMIN — ACETAMINOPHEN 650 MG: 325 TABLET ORAL at 22:02

## 2023-03-06 RX ADMIN — ACETAMINOPHEN 650 MG: 325 TABLET ORAL at 07:58

## 2023-03-06 RX ADMIN — IBUPROFEN 600 MG: 600 TABLET, FILM COATED ORAL at 17:42

## 2023-03-06 RX ADMIN — LIDOCAINE HYDROCHLORIDE AND EPINEPHRINE 3 ML: 15; 5 INJECTION, SOLUTION EPIDURAL at 09:24

## 2023-03-06 RX ADMIN — SODIUM CHLORIDE, SODIUM LACTATE, POTASSIUM CHLORIDE, AND CALCIUM CHLORIDE 125 ML/HR: .6; .31; .03; .02 INJECTION, SOLUTION INTRAVENOUS at 06:20

## 2023-03-06 RX ADMIN — ROPIVACAINE HYDROCHLORIDE: 2 INJECTION, SOLUTION EPIDURAL; INFILTRATION at 09:30

## 2023-03-06 RX ADMIN — SODIUM CHLORIDE, SODIUM LACTATE, POTASSIUM CHLORIDE, AND CALCIUM CHLORIDE 999 ML/HR: .6; .31; .03; .02 INJECTION, SOLUTION INTRAVENOUS at 14:30

## 2023-03-06 RX ADMIN — SODIUM CHLORIDE, SODIUM LACTATE, POTASSIUM CHLORIDE, AND CALCIUM CHLORIDE 125 ML/HR: .6; .31; .03; .02 INJECTION, SOLUTION INTRAVENOUS at 09:15

## 2023-03-06 RX ADMIN — Medication 2 MILLI-UNITS/MIN: at 05:51

## 2023-03-06 NOTE — PLAN OF CARE
Problem: PAIN - ADULT  Goal: Verbalizes/displays adequate comfort level or baseline comfort level  Description: Interventions:  - Encourage patient to monitor pain and request assistance  - Assess pain using appropriate pain scale  - Administer analgesics based on type and severity of pain and evaluate response  - Implement non-pharmacological measures as appropriate and evaluate response  - Consider cultural and social influences on pain and pain management  - Notify physician/advanced practitioner if interventions unsuccessful or patient reports new pain  Outcome: Progressing     Problem: INFECTION - ADULT  Goal: Absence or prevention of progression during hospitalization  Description: INTERVENTIONS:  - Assess and monitor for signs and symptoms of infection  - Monitor lab/diagnostic results  - Monitor all insertion sites, i e  indwelling lines, tubes, and drains  - Monitor endotracheal if appropriate and nasal secretions for changes in amount and color  - [place] appropriate cooling/warming therapies per order  - Administer medications as ordered  - Instruct and encourage patient and family to use good hand hygiene technique  - Identify and instruct in appropriate isolation precautions for identified infection/condition  Outcome: Progressing  Goal: Absence of fever/infection during neutropenic period  Description: INTERVENTIONS:  - Monitor WBC    Outcome: Progressing     Problem: SAFETY ADULT  Goal: Patient will remain free of falls  Description: INTERVENTIONS:  - Educate patient/family on patient safety including physical limitations  - Instruct patient to call for assistance with activity   - Consult OT/PT to assist with strengthening/mobility   - Keep Call bell within reach  - Keep bed low and locked with side rails adjusted as appropriate  - Keep care items and personal belongings within reach  - Initiate and maintain comfort rounds  - Make Fall Risk Sign visible to staff  - Apply yellow socks and bracelet for high fall risk patients  - Consider moving patient to room near nurses station  Outcome: Progressing  Goal: Maintain or return to baseline ADL function  Description: INTERVENTIONS:  -  Assess patient's ability to carry out ADLs; assess patient's baseline for ADL function and identify physical deficits which impact ability to perform ADLs (bathing, care of mouth/teeth, toileting, grooming, dressing, etc )  - Assess/evaluate cause of self-care deficits   - Assess range of motion  - Assess patient's mobility; develop plan if impaired  - Assess patient's need for assistive devices and provide as appropriate  - Encourage maximum independence but intervene and supervise when necessary  - Involve family in performance of ADLs  - Assess for home care needs following discharge   - Consider OT consult to assist with ADL evaluation and planning for discharge  - Provide patient education as appropriate  Outcome: Progressing  Goal: Maintains/Returns to pre admission functional level  Description: INTERVENTIONS:  - Perform BMAT or MOVE assessment daily    - Set and communicate daily mobility goal to care team and patient/family/caregiver  - Collaborate with rehabilitation services on mobility goals if consulted  - Out of bed for toileting  - Record patient progress and toleration of activity level   Outcome: Progressing     Problem: Knowledge Deficit  Goal: Patient/family/caregiver demonstrates understanding of disease process, treatment plan, medications, and discharge instructions  Description: Complete learning assessment and assess knowledge base  Interventions:  - Provide teaching at level of understanding  - Provide teaching via preferred learning methods  Outcome: Progressing  Goal: Verbalizes understanding of labor plan  Description: Assess patient/family/caregiver's baseline knowledge level and ability to understand information    Provide education via patient/family/caregiver's preferred learning method at appropriate level of understanding  1  Provide teaching at level of understanding  2  Provide teaching via preferred learning method(s)  Outcome: Progressing     Problem: DISCHARGE PLANNING  Goal: Discharge to home or other facility with appropriate resources  Description: INTERVENTIONS:  - Identify barriers to discharge w/patient and caregiver  - Arrange for needed discharge resources and transportation as appropriate  - Identify discharge learning needs (meds, wound care, etc )  - Arrange for interpretive services to assist at discharge as needed  - Refer to Case Management Department for coordinating discharge planning if the patient needs post-hospital services based on physician/advanced practitioner order or complex needs related to functional status, cognitive ability, or social support system  Outcome: Progressing     Problem: BIRTH - VAGINAL/ SECTION  Goal: Fetal and maternal status remain reassuring during the birth process  Description: INTERVENTIONS:  - Monitor vital signs  - Monitor fetal heart rate  - Monitor uterine activity  - Monitor labor progression (vaginal delivery)  - DVT prophylaxis  - Antibiotic prophylaxis  Outcome: Progressing  Goal: Emotionally satisfying birthing experience for mother/fetus  Description: Interventions:  - Assess, plan, implement and evaluate the nursing care given to the patient in labor  - Advocate the philosophy that each childbirth experience is a unique experience and support the family's chosen level of involvement and control during the labor process   - Actively participate in both the patient's and family's teaching of the birth process  - Consider cultural, Sabianist and age-specific factors and plan care for the patient in labor  Outcome: Progressing     Problem: Labor & Delivery  Goal: Manages discomfort  Description: Assess and monitor for signs and symptoms of discomfort  Assess patient's pain level regularly and per hospital policy  Administer medications as ordered  Support use of nonpharmacological methods to help control pain such as distraction, imagery, relaxation, and application of heat and cold  Collaborate with interdisciplinary team and patient to determine appropriate pain management plan  1  Include patient in decisions related to comfort  2  Offer non-pharmacological pain management interventions  3  Report ineffective pain management to physician  Outcome: Progressing  Goal: Patient vital signs are stable  Description: 1  Assess vital signs - vaginal delivery    Outcome: Progressing

## 2023-03-06 NOTE — OB LABOR/OXYTOCIN SAFETY PROGRESS
Oxytocin Safety Progress Check Note - Norman Carpenter 28 y o  female MRN: [id number]    Unit/Bed#: -01 Encounter: [id number]    Dose (eklly-units/min) Oxytocin: 10 kelly-units/min  Contraction Frequency (minutes): 2-5  Contraction Quality: Moderate  Tachysystole: No   Cervical Dilation: 3        Cervical Effacement: 70  Fetal Station: -3  Baseline Rate: 120 bpm  Fetal Heart Rate: 140 BPM  FHR Category: Category I               Vital Signs:   Vitals:    03/06/23 0840   BP: 114/54   Pulse: 68   Resp:    Temp:    SpO2:        Notes/comments: SVE unchanged  FHT Cat I  Plan for epidural  Continue pitocin titration          Margarita Pan MD 3/6/2023 9:01 AM

## 2023-03-06 NOTE — PLAN OF CARE
Problem: PAIN - ADULT  Goal: Verbalizes/displays adequate comfort level or baseline comfort level  Description: Interventions:  - Encourage patient to monitor pain and request assistance  - Assess pain using appropriate pain scale  - Administer analgesics based on type and severity of pain and evaluate response  - Implement non-pharmacological measures as appropriate and evaluate response  - Consider cultural and social influences on pain and pain management  - Notify physician/advanced practitioner if interventions unsuccessful or patient reports new pain  Outcome: Completed     Problem: INFECTION - ADULT  Goal: Absence or prevention of progression during hospitalization  Description: INTERVENTIONS:  - Assess and monitor for signs and symptoms of infection  - Monitor lab/diagnostic results  - Monitor all insertion sites, i e  indwelling lines, tubes, and drains  - Monitor endotracheal if appropriate and nasal secretions for changes in amount and color  - [place] appropriate cooling/warming therapies per order  - Administer medications as ordered  - Instruct and encourage patient and family to use good hand hygiene technique  - Identify and instruct in appropriate isolation precautions for identified infection/condition  Outcome: Completed  Goal: Absence of fever/infection during neutropenic period  Description: INTERVENTIONS:  - Monitor WBC    Outcome: Completed     Problem: SAFETY ADULT  Goal: Patient will remain free of falls  Description: INTERVENTIONS:  - Educate patient/family on patient safety including physical limitations  - Instruct patient to call for assistance with activity   - Consult OT/PT to assist with strengthening/mobility   - Keep Call bell within reach  - Keep bed low and locked with side rails adjusted as appropriate  - Keep care items and personal belongings within reach  - Initiate and maintain comfort rounds  - Make Fall Risk Sign visible to staff  - Apply yellow socks and bracelet for high fall risk patients  - Consider moving patient to room near nurses station  Outcome: Completed  Goal: Maintain or return to baseline ADL function  Description: INTERVENTIONS:  -  Assess patient's ability to carry out ADLs; assess patient's baseline for ADL function and identify physical deficits which impact ability to perform ADLs (bathing, care of mouth/teeth, toileting, grooming, dressing, etc )  - Assess/evaluate cause of self-care deficits   - Assess range of motion  - Assess patient's mobility; develop plan if impaired  - Assess patient's need for assistive devices and provide as appropriate  - Encourage maximum independence but intervene and supervise when necessary  - Involve family in performance of ADLs  - Assess for home care needs following discharge   - Consider OT consult to assist with ADL evaluation and planning for discharge  - Provide patient education as appropriate  Outcome: Completed  Goal: Maintains/Returns to pre admission functional level  Description: INTERVENTIONS:  - Perform BMAT or MOVE assessment daily    - Set and communicate daily mobility goal to care team and patient/family/caregiver  - Collaborate with rehabilitation services on mobility goals if consulted    - Record patient progress and toleration of activity level   Outcome: Completed     Problem: Knowledge Deficit  Goal: Patient/family/caregiver demonstrates understanding of disease process, treatment plan, medications, and discharge instructions  Description: Complete learning assessment and assess knowledge base  Interventions:  - Provide teaching at level of understanding  - Provide teaching via preferred learning methods  Outcome: Completed  Goal: Verbalizes understanding of labor plan  Description: Assess patient/family/caregiver's baseline knowledge level and ability to understand information  Provide education via patient/family/caregiver's preferred learning method at appropriate level of understanding       1  Provide teaching at level of understanding  2  Provide teaching via preferred learning method(s)  Outcome: Completed     Problem: DISCHARGE PLANNING  Goal: Discharge to home or other facility with appropriate resources  Description: INTERVENTIONS:  - Identify barriers to discharge w/patient and caregiver  - Arrange for needed discharge resources and transportation as appropriate  - Identify discharge learning needs (meds, wound care, etc )  - Arrange for interpretive services to assist at discharge as needed  - Refer to Case Management Department for coordinating discharge planning if the patient needs post-hospital services based on physician/advanced practitioner order or complex needs related to functional status, cognitive ability, or social support system  Outcome: Completed     Problem: BIRTH - VAGINAL/ SECTION  Goal: Fetal and maternal status remain reassuring during the birth process  Description: INTERVENTIONS:  - Monitor vital signs  - Monitor fetal heart rate  - Monitor uterine activity  - Monitor labor progression (vaginal delivery)  - DVT prophylaxis  - Antibiotic prophylaxis  Outcome: Completed  Goal: Emotionally satisfying birthing experience for mother/fetus  Description: Interventions:  - Assess, plan, implement and evaluate the nursing care given to the patient in labor  - Advocate the philosophy that each childbirth experience is a unique experience and support the family's chosen level of involvement and control during the labor process   - Actively participate in both the patient's and family's teaching of the birth process  - Consider cultural, Hoahaoism and age-specific factors and plan care for the patient in labor  Outcome: Completed     Problem: Labor & Delivery  Goal: Manages discomfort  Description: Assess and monitor for signs and symptoms of discomfort  Assess patient's pain level regularly and per hospital policy  Administer medications as ordered   Support use of nonpharmacological methods to help control pain such as distraction, imagery, relaxation, and application of heat and cold  Collaborate with interdisciplinary team and patient to determine appropriate pain management plan  1  Include patient in decisions related to comfort  2  Offer non-pharmacological pain management interventions  3  Report ineffective pain management to physician  Outcome: Completed  Goal: Patient vital signs are stable  Description: 1  Assess vital signs - vaginal delivery    Outcome: Completed

## 2023-03-06 NOTE — OB LABOR/OXYTOCIN SAFETY PROGRESS
Oxytocin Safety Progress Check Note - Flaca Orourke 28 y o  female MRN: [id number]    Unit/Bed#: -01 Encounter: [id number]    Dose (kelly-units/min) Oxytocin: 14 kelly-units/min  Contraction Frequency (minutes): 2 5-4  Contraction Quality: Moderate  Tachysystole: No   Cervical Dilation: 4        Cervical Effacement: 70  Fetal Station: -3  Baseline Rate: 120 bpm  Fetal Heart Rate: 115 BPM  FHR Category: Category II               Vital Signs:   Vitals:    03/06/23 1110   BP: 111/56   Pulse: 64   Resp:    Temp:    SpO2:        Notes/comments: FHT demonstrating intermittent late decelerations with resolution to baseline, moderate variability, and spontaneous accels  SVE as above  Patient repositioned and fluids bolused  Will continue to monitor closely and plan for amniotomy at next check if safe to do so       D/w Dr Ester Dobbins MD 3/6/2023 11:23 AM

## 2023-03-06 NOTE — ANESTHESIA PREPROCEDURE EVALUATION
Procedure:  LABOR ANALGESIA    Relevant Problems   GYN   (+) 40 weeks gestation of pregnancy      HEMATOLOGY   (+) Anemia affecting pregnancy in third trimester      NEURO/PSYCH   (+) History of marijuana use       @ 40+2 for eIOL    Lab Results   Component Value Date    WBC 11 95 (H) 2023    HGB 9 0 (L) 2023    HCT 28 2 (L) 2023    MCV 86 2023     2023     Type and Screen:  O         Anesthesia Plan  ASA Score- 2     Anesthesia Type- epidural with ASA Monitors  Additional Monitors:   Airway Plan:           Plan Factors-Exercise tolerance (METS): >4 METS  Chart reviewed  Existing labs reviewed  Patient summary reviewed  Induction-     Postoperative Plan-     Informed Consent- Anesthetic plan and risks discussed with patient  I personally reviewed this patient with the CRNA  Discussed and agreed on the Anesthesia Plan with the CRNA  Kareem Mirza

## 2023-03-06 NOTE — ANESTHESIA POSTPROCEDURE EVALUATION
Post-Op Assessment Note    CV Status:  Stable  Pain Score: 0    Pain management: adequate     Mental Status:  Alert and awake   Hydration Status:  Euvolemic   PONV Controlled:  Controlled   Airway Patency:  Patent      Post Op Vitals Reviewed: Yes        Post-op block assessment: catheter intact and no complications      No notable events documented      BP      Temp      Pulse     Resp      SpO2

## 2023-03-06 NOTE — OB LABOR/OXYTOCIN SAFETY PROGRESS
Oxytocin Safety Progress Check Note - Wong Tyler 28 y o  female MRN: [id number]    Unit/Bed#: -01 Encounter: [id number]    Dose (kelly-units/min) Oxytocin: 16 kelly-units/min  Contraction Frequency (minutes): 2-4  Contraction Quality: Moderate  Tachysystole: No   Cervical Dilation: 10  Dilation Complete Date: 23  Dilation Complete Time: 1455  Cervical Effacement: 90  Fetal Station: 0  Baseline Rate: 125 bpm  Fetal Heart Rate: 115 BPM  FHR Category: Category II               Vital Signs:   Vitals:    23 1441   BP: 125/58   Pulse: 73   Resp:    Temp:    SpO2:        Notes/comments:     Patient complete and +2 station  FHT category II with variable decelerations, however moderate variability in between  Will start pushing  Anticipate  soon  Dr Viv To present in the room           Dave Diana MD 3/6/2023 2:57 PM

## 2023-03-06 NOTE — OB LABOR/OXYTOCIN SAFETY PROGRESS
[facility] 28 [facility] MRN: [id number]    Unit/Bed#: -01 Encounter: [id number]    Dose (kelly-units/min) Oxytocin: 16 kelly-units/min  Contraction Frequency (minutes): 3-4  Contraction Quality: Moderate  Tachysystole: No   Cervical Dilation: 4        Cervical Effacement: 90  Fetal Station: 0  Baseline Rate: 120 bpm  Fetal Heart Rate: 115 BPM  FHR Category: Category I               Vital Signs:   Vitals:    03/06/23 1410   BP: (!) 91/46   Pulse: 67   Resp:    Temp:    SpO2:        Notes/comments:   Cervix now 4/90/0, comfortable with epidural  AROM'd for minimal amount of clear fluid  FHT with some previous late decelerations, now early, moderate variability and accelerations  Continue shereen Vital MD 3/6/2023 2:31 PM

## 2023-03-06 NOTE — DISCHARGE SUMMARY
Discharge Summary - OB/GYN   Raoul Tidwell 28 y o  female MRN: [id number]  Unit/Bed#: -01 Encounter: [id number]      Admission Date: 3/5/2023     Discharge Date: 3/7/2023    Admitting Diagnosis:   1  Pregnancy at 40w2d  2  Anemia    Discharge Diagnosis:   Same, delivered      Procedures: spontaneous vaginal delivery    Admitted Attending: Dr Eddie Sheikh  Delivering Attending: Iola Shone*  Discharge Attending: Dr Lizett Gamboa:      Raoul Tidwell is a 28 y o  [id number] at 40w2d wks who was initially admitted for an elective IOL  On presentation, her cervix was 3/50/-3 and she was started on a Pitocin titration  She received an epidural for analgesia and was artificially ruptured for clear fluid  She progressed to complete cervical dilation prior to pushing  She delivered a viable female  on 3/06/23 at 12  Weight 7lbs 5 6oz via spontaneous vaginal delivery  Apgars were 9 (1 min) and 9 (5 min)   remained in the delivery room after birth  Patient tolerated the procedure well and was transferred to recovery in stable condition  Her post-partum course was uncomplicated  Her post-partum pain was well controlled with oral analgesics  On day of discharge, she was ambulating and able to reasonably perform all ADLs  She was voiding and had appropriate bowel function  Pain was well controlled  She was discharged home on post-partum day #1 without complications  Patient was instructed to follow up with her OB as an outpatient and was given appropriate warnings to call provider if she develops signs of infection or uncontrolled pain  Complications: none apparent    Condition at discharge: good     Discharge instructions/Information to patient and family:   See after visit summary for information provided to patient and family        Provisions for Follow-Up Care:  See after visit summary for information related to follow-up care and any pertinent home health orders  Disposition: Home    Planned Readmission: No    Discharge Medications: For a complete list of the patient's medications, please refer to her med rec

## 2023-03-06 NOTE — H&P
[address] 28 y o  female MRN: [id number]  Unit/Bed#: -01 Encounter: [id number]    Assessment: 28 y o   at 40w1d admitted for induction of labor by maternal request      SVE: 3/50/-3  FHT: Baseline Rate: 120 bpm/ Moderate Variability 6-25 bpm/ Accelerations: 15 x 15, no decelerations  FHR Category: Category I  Clinical EFW: 39%ile; Vertex confirmed by US  GBS status: negative   Postpartum contraception plan: Bilateral Salpingectomy  Induction: Pitocin     Plan:   * 40 weeks gestation of pregnancy  Assessment & Plan  Admit  IV Fluids  Clear liquid diet  CBC, RPR, T &S  Analgesia at maternal request      Sterilization consult  Assessment & Plan  Desire Bilateral Salpingectomy in case of a  and PP if vaginal delivery  Undecided on bridge    Abnormal glucose affecting pregnancy  Assessment & Plan  Elevated 1 hr GTT but Normal repeat 3 hr    Anemia affecting pregnancy in third trimester  Assessment & Plan  F/U admission CBC    History of marijuana use  Assessment & Plan  F/U UDS        Discussed case and plan w/ Dr Jesus Corrigan      Chief Complaint: Induction of Labor by maternal request    HPI: Adolfo Aguayo is a 28 y o  [id number] with an JACKLYN of 3/4/2023, by Ultrasound at 40w1d who is being admitted for induction of labor   She denies having uterine contractions, has no LOF and scant LOF, and reports no VB  She states she has felt good [facility] Caller Patient Active Problem List   Diagnosis   • History of marijuana use   • Anemia affecting pregnancy in third trimester   • Back pain affecting pregnancy   • Abnormal glucose affecting pregnancy   • 40 weeks gestation of pregnancy   • COVID-19 vaccine series declined   • Sterilization consult       Baby complications/comments: none    Review of Systems   Constitutional: Negative for chills and fever  Eyes: Negative for photophobia  Respiratory: Negative for cough and shortness of breath      Cardiovascular: Negative for chest pain  Gastrointestinal: Negative for diarrhea, nausea and vomiting  Genitourinary: Negative for dysuria, vaginal bleeding and vaginal discharge  Neurological: Negative for headaches  OB Hx:  OB History    Para Term  AB Living   4 2 2   1 2   SAB IAB Ectopic Multiple Live Births           2      # Outcome Date GA Lbr Kvng/2nd Weight Sex Delivery Anes PTL Lv   4 Current            3 Term 21 39w6d  3280 g (7 lb 3 7 oz) F    URMILA   2 Term    4054 g (8 lb 15 oz) F    URMILA   1 AB                Past Medical Hx:  Past Medical History:   Diagnosis Date   • Migraine     occas  • Varicella     had vaccines       Past Surgical hx:  Past Surgical History:   Procedure Laterality Date   • INDUC[facility]       [facility]       Social Hx:  Alcohol use: no  Tobacco use: no  Other substance use: none    Other: none    No Known Allergies    Medications Prior to Admission   Medication   • Acetaminophen (TYLENOL 8 HOUR PO)   • ferrous sulfate 325 (65 Fe) mg tablet   • Prenatal Vit-Fe Fumarate-FA (PRENATAL VITAMIN PO)   • nystatin-triamcinolone (MYCOLOG-II) ointment       Objective:  Temp:  [98 8 °F (37 1 °C)] 98 8 °F (37 1 °C)  HR:  [] 94  Resp:  [18] 18  BP: (130)/(60) 130/60  Body mass index is 32 93 kg/m²  Physical Exam:  Physical Exam  Constitutional:       Appearance: Normal appearance  Genitourinary:      Vulva normal    HENT:      Head: Normocephalic and atraumatic  Eyes:      Extraocular Movements: Extraocular movements intact  Cardiovascular:      Rate and Rhythm: Normal rate and regular rhythm  Pulses: Normal pulses  Pulmonary:      Effort: Pulmonary effort is normal    Abdominal:      General: There is distension (gravid, non-tender to palpation)  Palpations: Abdomen is soft  Musculoskeletal:         General: Normal range of motion  Cervical back: Normal range of motion  Neurological:      Mental Status: She is alert  Skin:     General: Skin is warm     Psychiatric:         Mood and Affect: Mood normal          Behavior: Behavior normal             FHT:  Baseline Rate: 120 bpm  Variability: Moderate 6-25 bpm  Accelerations: 15 x 15 or greater, At variable times  Decelerations: None  FHR Category: Category I    TOCO:   Contraction Frequency (minutes): none noted  Contraction Duration (seconds): 0  Contraction Quality: Mild    Lab Results   Component Value Date    WBC 11 95 (H) 03/05/2023    HGB 9 0 (L) 03/05/2023    HCT 28 2 (L) 03/05/2023     03/05/2023     No results found for: NA, K, CL, CO2, BUN, CREATININE, GLUCOSE, AST, ALT  Prenatal Labs: Reviewed      Blood type: O pos   Antibody: negative   GBS: negative   HIV: negative   Rubella: Immune  VDRL/RPR: Non reactive  HBsAg: Negative  Chlamydia: Negative  Gonorrhea: Negative  Diabetes 1 hour screen:146/103/160  3 hour glucose: 98  Platelets: 160  Hgb: 9 0  >2 Midnights  INPATIENT     Signature/Title: Charlene Miller MD  Date: 3/6/2023  Time: 1:48 AM

## 2023-03-06 NOTE — L&D DELIVERY NOTE
Vaginal Delivery Summary - OB/GYN   Skylar Morfin 28 y o  female MRN: [id number]  Unit/Bed#: -01 Encounter: [id number]      Pre-delivery Diagnosis:   1  Pregnancy at 40+2   2  Obesity  3  History of THC use  4  Anemia    Post-delivery Diagnosis: same, delivered    Procedure: Spontaneous Vaginal Delivery     Attending: Marbella Walters MD    Assistant(s): Wade Benson MD    Anesthesia: Epidural    QBL: 268OF    Complications: none apparent    Specimens:   1  Arterial and venous cord gases  2  Cord blood  3  Segment of umbilical cord  4  Placenta to storage     Findings:  1  Viable female on 3/6/2023 at 1458, with APGARS of 9 and 9 at 1 and 5 minutes respectively,  2  Spontaneous delivery of intact placenta at 1505  3  1 degree laceration hemostatic and well approximated, not repaired  4  Blood gases:   Arterial pH: 7 231   Arterial base excess: -2 2   Venous pH: 7 383   Venous base excess: -1 7    Disposition:  Patient tolerated the procedure well and was recovering in labor and delivery room       Brief history and labor course:  Ms Skylar Morfin is a 28 y o  [id number] at 40w2d  She presented to labor and delivery for elective induction of labor  Her pregnancy was complicated by obesity, anemia  On exam in triage she was noted to be 3/50/-3  She was admitted for induction  She received pitocin, epidural and underwent AROM  Description of procedure:  After pushing for 2 minutes, at 1458 patient delivered a viable female , wt pending, apgars of 9 (1 min) and 9 (5 min)  The fetal vertex delivered spontaneously  There was single nuchal cord that was easily reduced  The left anterior shoulder delivered atraumatically with maternal expulsive forces and the assistance of gentle downward traction  The right posterior shoulder delivered with maternal expulsive forces and the assistance of gentle upward traction  The remainder of the fetus delivered spontaneously       Upon delivery, the infant was placed on the mothers abdomen and the cord was clamped and cut  The infant was noted to cry spontaneously and was moving all extremities appropriately  There was no evidence for injury  Awaiting nurse resuscitators evaluated the   Arterial and venous cord blood gases and cord blood was collected for analysis  These were promptly sent to the lab  In the immediate post-partum, 30 units of IV pitocin was administered, and the uterus was noted to contract down well with massage and pitocin  The placenta delivered spontaneously at 1505 and was noted to have a centrally inserted 3 vessel cord  The vagina, cervix, perineum, and rectum were inspected and there was noted to be a small first degree  Laceration Repair  Laceration was hemostatic and well approximated, no suture were placed  At the conclusion of the procedure, all needle, sponge, and instrument counts were noted to be correct  Patient tolerated the procedure well and was allowed to recover in labor and delivery room with family and  before being transferred to the post-partum floor  I was present and participated in all key portions of the case        Omar Bhatti MD  3/6/2023  3:13 PM

## 2023-03-06 NOTE — ANESTHESIA PROCEDURE NOTES
Epidural Block    Patient location during procedure: OB  Start time: 3/6/2023 9:24 AM  Reason for block: procedure for pain and at surgeon's request  Staffing  Performed: CRNA   Anesthesiologist: Efe White MD  Resident/CRNA: Damaris Rahman CRNA  Preanesthetic Checklist  Completed: patient identified, IV checked, site marked, risks and benefits discussed, surgical consent, monitors and equipment checked, pre-op evaluation and timeout performed  Epidural  Patient position: sitting  Prep: ChloraPrep  Patient monitoring: cardiac monitor and frequent blood pressure checks  Approach: midline  Location: lumbar  Injection technique: RONALDO saline  Needle  Needle type: Tuohy   Needle gauge: 18 G  Catheter type: side hole  Catheter size: 20 G  Catheter at skin depth: 12 cm  Catheter securement method: stabilization device  Test dose: negative  Assessment  Number of attempts: 2negative aspiration for CSF, negative aspiration for heme and no paresthesia on injection  patient tolerated the procedure well with no immediate complications

## 2023-03-07 VITALS
SYSTOLIC BLOOD PRESSURE: 123 MMHG | HEIGHT: 66 IN | HEART RATE: 85 BPM | OXYGEN SATURATION: 98 % | BODY MASS INDEX: 32.78 KG/M2 | DIASTOLIC BLOOD PRESSURE: 59 MMHG | TEMPERATURE: 99.1 F | RESPIRATION RATE: 18 BRPM | WEIGHT: 204 LBS

## 2023-03-07 RX ORDER — IBUPROFEN 600 MG/1
600 TABLET ORAL EVERY 6 HOURS PRN
Qty: 30 TABLET | Refills: 0 | Status: SHIPPED | OUTPATIENT
Start: 2023-03-07

## 2023-03-07 RX ORDER — ACETAMINOPHEN 325 MG/1
650 TABLET ORAL EVERY 6 HOURS PRN
Qty: 30 TABLET | Refills: 0 | Status: SHIPPED | OUTPATIENT
Start: 2023-03-07

## 2023-03-07 RX ORDER — DOCUSATE SODIUM 100 MG/1
100 CAPSULE, LIQUID FILLED ORAL 2 TIMES DAILY
Refills: 0
Start: 2023-03-07

## 2023-03-07 RX ADMIN — DOCUSATE SODIUM 100 MG: 100 CAPSULE, LIQUID FILLED ORAL at 17:14

## 2023-03-07 RX ADMIN — IBUPROFEN 600 MG: 600 TABLET, FILM COATED ORAL at 00:45

## 2023-03-07 RX ADMIN — IBUPROFEN 600 MG: 600 TABLET, FILM COATED ORAL at 17:14

## 2023-03-07 RX ADMIN — IBUPROFEN 600 MG: 600 TABLET, FILM COATED ORAL at 06:21

## 2023-03-07 RX ADMIN — IRON SUCROSE 200 MG: 20 INJECTION, SOLUTION INTRAVENOUS at 09:46

## 2023-03-07 RX ADMIN — DOCUSATE SODIUM 100 MG: 100 CAPSULE, LIQUID FILLED ORAL at 08:00

## 2023-03-07 NOTE — LACTATION NOTE
This note was copied from a baby's chart  CONSULT - LACTATION  Baby Girl (Heather Sharma) Ron Arrow 1 days female MRN: [id number]    [address] Room / Bed: (N)/(N) Encounter: [id number]    Maternal Information     MOTHER:  Rosa Maria Causey  Maternal Age: 28 y o    OB History: # 1 - Date: None, Sex: None, Weight: None, GA: None, Delivery: None, Apgar1: None, Apgar5: None, Living: None, Birth Comments: None    # 2 - Date: , Sex: Female, Weight: 4054 g (8 lb 15 oz), GA: None, Delivery: None, Apgar1: None, Apgar5: None, Living: Living, Birth Comments: None    # 3 - Date: 21, Sex: Female, Weight: 3280 g (7 lb 3 7 oz), GA: 39w6d, Delivery: None, Apgar1: 9, Apgar5: 9, Living: Living, Birth Comments: None    # 4 - Date: 23, Sex: Female, Weight: 3335 g (7 lb 5 6 oz), GA: 40w2d, Delivery: Vaginal, Spontaneous, Apgar1: 9, Apgar5: 9, Living: Living, Birth Comments: None   Previouse breast reduction surgery? No    Lactation history:   Has patient previously breast fed: Yes   How long had patient previously breast fed:     Previous breast feeding complications:       Past Surgical History:   Procedure Laterality Date   • INDUCED       [facility]        Birth information:  YOB: 2024   Time of birth: 2:58 PM   Sex: female   Delivery type: Vaginal, Spontaneous   Birth Weight: 3335 g (7 lb 5 6 oz)   Percent of Weight Change: 0%     Gestational Age: 41w4d   [unfilled]      Feeding recommendations:  breast feed on demand     Met with mother  Provided mother with Ready, Set, Baby booklet  Discussed Skin to Skin contact an benefits to mom and baby  Talked about the delay of the first bath until baby has adjusted  Spoke about the benefits of rooming in  Feeding on cue and what that means for recognizing infant's hunger  Avoidance of pacifiers for the first month discussed   Talked about exclusive breastfeeding for the first 6 months  Positioning and latch reviewed as well as showing images of other feeding positions  Discussed the properties of a good latch in any position  Reviewed hand/manual expression  Discussed s/s that baby is getting enough milk and some s/s that breastfeeding dyad may need further help  Gave information on common concerns, what to expect the first few weeks after delivery, preparing for other caregivers, and how partners can help  Resources for support also provided  Information on hand expression given  Discussed benefits of knowing how to manually express breast including stimulating milk supply, softening nipple for latch and evacuating breast in the event of engorgement  Discussed 2nd night syndrome and ways to calm infant  Hand out given  Provided DC booklet at this time, enc family to review and prepare questions for day of DC  Met with mother to go over discharge breastfeeding booklet including the feeding log  Emphasized 8 or more (12) feedings in a 24 hour period, what to expect for the number of diapers per day of life and the progression of properties of the  stooling pattern  Reviewed breastfeeding and your lifestyle, storage and preparation of breast milk, how to keep you breast pump clean, the employed breastfeeding mother and paced bottle feeding handouts  Booklet included Breastfeeding Resources for after discharge including access to the number for the [address]  Discussed this as the best resource to contact for questions or concerns regarding breasts,  feedings, and breastmilk  Discussed s/s engorgement and how to manage with medications, additional feedings at the breast or pumping sessions as needed, and cool compresses as well as s/s and management of mastitis and when to contact physician  Reviewed booklet and feeding log, addressed questions related to DC teaching   Enc family to continue to feed the baby on demand, look for signs of effective breastfeed like audible swallows, strong but comfortable tugging while latched, breasts softening (after milk comes in), baby falling asleep and releasing the breast, and meeting daily diaper goals       Discussed hunger cues, enc Mom to place baby skin to skin for feedings  She is offer colostrum throughout attempts  Baby is latching well, per Mom, just has been sleepy  Discussed goal of 8-12 feedings per day, enc her to call for assistance  Reassurance provided based on feeding/output log       Consult placed for SS2   Jorge Emmanuel RN 3/7/2023 12:19 PM

## 2023-03-07 NOTE — PLAN OF CARE
Problem: POSTPARTUM  Goal: Experiences normal postpartum course  Description: INTERVENTIONS:  - Monitor maternal vital signs  - Assess uterine involution and lochia  Outcome: Progressing  Goal: Appropriate maternal -  bonding  Description: INTERVENTIONS:  - Identify family support  - Assess for appropriate maternal/infant bonding   -Encourage maternal/infant bonding opportunities  - Referral to  or  as needed  Outcome: Progressing  Goal: Establishment of infant feeding pattern  Description: INTERVENTIONS:  - Assess breast/bottle feeding  - Refer to lactation as needed  Outcome: Progressing  Goal: Incision(s), wounds(s) or drain site(s) healing without S/S of infection  Description: INTERVENTIONS  - Assess and document dressing, incision, wound bed, drain sites and surrounding tissue  - Provide patient and family education  - Perform skin care/dressing changes every   Outcome: Progressing     Problem: PAIN - ADULT  Goal: Verbalizes/displays adequate comfort level or baseline comfort level  Description: Interventions:  - Encourage patient to monitor pain and request assistance  - Assess pain using appropriate pain scale  - Administer analgesics based on type and severity of pain and evaluate response  - Implement non-pharmacological measures as appropriate and evaluate response  - Consider cultural and social influences on pain and pain management  - Notify physician/advanced practitioner if interventions unsuccessful or patient reports new pain  Outcome: Progressing     Problem: INFECTION - ADULT  Goal: Absence or prevention of progression during hospitalization  Description: INTERVENTIONS:  - Assess and monitor for signs and symptoms of infection  - Monitor lab/diagnostic results  - Monitor all insertion sites, i e  indwelling lines, tubes, and drains  - Monitor endotracheal if appropriate and nasal secretions for changes in amount and color  - [place] appropriate cooling/warming therapies per order  - Administer medications as ordered  - Instruct and encourage patient and family to use good hand hygiene technique  - Identify and instruct in appropriate isolation precautions for identified infection/condition  Outcome: Progressing  Goal: Absence of fever/infection during neutropenic period  Description: INTERVENTIONS:  - Monitor WBC    Outcome: Progressing     Problem: SAFETY ADULT  Goal: Patient will remain free of falls  Description: INTERVENTIONS:  - Educate patient/family on patient safety including physical limitations  - Instruct patient to call for assistance with activity   - Consult OT/PT to assist with strengthening/mobility   - Keep Call bell within reach  - Keep bed low and locked with side rails adjusted as appropriate  - Keep care items and personal belongings within reach  - Initiate and maintain comfort rounds  - Make Fall Risk Sign visible to staff  - Offer Toileting every  Hours, in advance of need  - Initiate/Maintain alarm  - Obtain necessary fall risk management equipment:   - Apply yellow socks and bracelet for high fall risk patients  - Consider moving patient to room near nurses station  Outcome: Progressing  Goal: Maintain or return to baseline ADL function  Description: INTERVENTIONS:  -  Assess patient's ability to carry out ADLs; assess patient's baseline for ADL function and identify physical deficits which impact ability to perform ADLs (bathing, care of mouth/teeth, toileting, grooming, dressing, etc )  - Assess/evaluate cause of self-care deficits   - Assess range of motion  - Assess patient's mobility; develop plan if impaired  - Assess patient's need for assistive devices and provide as appropriate  - Encourage maximum independence but intervene and supervise when necessary  - Involve family in performance of ADLs  - Assess for home care needs following discharge   - Consider OT consult to assist with ADL evaluation and planning for discharge  - Provide patient education as appropriate  Outcome: Progressing  Goal: Maintains/Returns to pre admission functional level  Description: INTERVENTIONS:  - Perform BMAT or MOVE assessment daily    - Set and communicate daily mobility goal to care team and patient/family/caregiver  - Collaborate with rehabilitation services on mobility goals if consulted  - Perform Range of Motion  times a day  - Reposition patient every  hours  - Dangle patient  times a day  - Stand patient  times a day  - Ambulate patient  times a day  - Out of bed to chair  times a day   - Out of bed for meals times a day  - Out of bed for toileting  - Record patient progress and toleration of activity level   Outcome: Progressing     Problem: Knowledge Deficit  Goal: Patient/family/caregiver demonstrates understanding of disease process, treatment plan, medications, and discharge instructions  Description: Complete learning assessment and assess knowledge base    Interventions:  - Provide teaching at level of understanding  - Provide teaching via preferred learning methods  Outcome: Progressing     Problem: DISCHARGE PLANNING  Goal: Discharge to home or other facility with appropriate resources  Description: INTERVENTIONS:  - Identify barriers to discharge w/patient and caregiver  - Arrange for needed discharge resources and transportation as appropriate  - Identify discharge learning needs (meds, wound care, etc )  - Arrange for interpretive services to assist at discharge as needed  - Refer to Case Management Department for coordinating discharge planning if the patient needs post-hospital services based on physician/advanced practitioner order or complex needs related to functional status, cognitive ability, or social support system  Outcome: Progressing

## 2023-03-07 NOTE — PROGRESS NOTES
Progress Note - OB/GYN  Rene Arboleda 28 y o  female MRN: [id number]  Unit/Bed#: -01 Encounter: [id number]    Assessment and [id number] [facility] is a patient of: [facility]  She is PPD# 1 s/p  spontaneous vaginal delivery  Recovering well and is stable        (spontaneous vaginal delivery)  Assessment & Plan  Lochia WNL   Recovering well   Appropriate bowel and bladder function   Pain well controlled   Tolerating diet   Breastfeeding   Ambulating without issues   No lower extremity tenderness  GBS negative   Rh positive       Sterilization consult  Assessment & Plan  Desire Bilateral Salpingectomy postpartum, MA-31 signed  Declines bridge    Abnormal glucose affecting pregnancy  Assessment & Plan  Elevated 1 hr GTT but Normal repeat 3 hr    Anemia affecting pregnancy in third trimester  Assessment & Plan  admission CBC Hgb 9 0    History of marijuana use  Assessment & Plan  UDS neg      Disposition    - Anticipate discharge home on PPD# 1-2      Subjective/Objective     Chief Complaint: Postpartum State     Subjective:    Rene Arboleda is PPD#1 s/p  spontaneous vaginal delivery  She has no current complaints  Pain is well controlled  Patient is currently voiding  She is ambulating  Patient is currently passing flatus and has had no bowel movement  She is tolerating PO, and denies nausea or vomitting  Patient denies fever, chills, chest pain, shortness of breath, or calf tenderness  Lochia is normal  She is  Breastfeeding  She is recovering well and is stable         Vitals:   /67 (BP Location: Left arm)   Pulse 78   Temp 98 9 °F (37 2 °C) (Temporal)   Resp 20   Ht 5' 6" (1 676 m)   Wt 92 5 kg (204 lb)   LMP 2022 (Approximate)   SpO2 98%   Breastfeeding Yes   BMI 32 93 kg/m²       Intake/Output Summary (Last 24 hours) at 3/7/2023 0647  Last data filed at 3/7/2023 0015  Gross per 24 hour   Intake 2000 ml   Output 2812 ml   Net -812 ml       Invasive Devices Peripheral Intravenous Line  Duration           Peripheral IV 03/05/23 Left;Upper Forearm 1 day                Physical Exam:   GEN: [employer] appears well, alert and oriented x 3, pleasant and cooperative   CARDIO: RRR, no murmurs or rubs  RESP:  CTAB, no wheezes or rales  ABDOMEN: soft, no tenderness, no distention, fundus @ umbilicus  EXTREMITIES: SCDs on, non tender, no erythema, b/l Britany's sign negative      Labs:     Hemoglobin   Date Value Ref Range Status   03/05/2023 9 0 (L) 11 5 - 15 4 g/dL Final   02/11/2023 10 3 (L) 11 5 - 15 4 g/dL Final     WBC   Date Value Ref Range Status   03/05/2023 11 95 (H) 4 31 - 10 16 Thousand/uL Final   02/11/2023 8 31 4 31 - 10 16 Thousand/uL Final     Platelets   Date Value Ref Range Status   03/05/2023 260 149 - 390 Thousands/uL Final   02/11/2023 267 149 - 390 Thousands/uL Final          Unruly Russell MD  3/7/2023  6:47 AM

## 2023-03-07 NOTE — PLAN OF CARE
Problem: POSTPARTUM  Goal: Experiences normal postpartum course  Description: INTERVENTIONS:  - Monitor maternal vital signs  - Assess uterine involution and lochia  3/7/2023 1800 by Eun Mckinney  Outcome: Adequate for Discharge  3/7/2023 131 by Eun Mckinney  Outcome: Progressing  Goal: Appropriate maternal -  bonding  Description: INTERVENTIONS:  - Identify family support  - Assess for appropriate maternal/infant bonding   -Encourage maternal/infant bonding opportunities  - Referral to  or  as needed  3/7/2023 1800 by Eun Mckinney  Outcome: Adequate for Discharge  3/7/2023 1315 by Eun Mckinney  Outcome: Progressing  Goal: Establishment of infant feeding pattern  Description: INTERVENTIONS:  - Assess breast/bottle feeding  - Refer to lactation as needed  3/7/2023 1800 by Eun Mckinney  Outcome: Adequate for Discharge  3/7/2023 131 by Eun Mckinney  Outcome: Progressing  Goal: Incision(s), wounds(s) or drain site(s) healing without S/S of infection  Description: INTERVENTIONS  - Assess and document dressing, incision, wound bed, drain sites and surrounding tissue  - Provide patient and family education  - Perform skin care/dressing changes every   3/7/2023 1800 by Eun Mckinney  Outcome: Adequate for Discharge  3/7/2023 131 by Eun Mckinney  Outcome: Progressing     Problem: PAIN - ADULT  Goal: Verbalizes/displays adequate comfort level or baseline comfort level  Description: Interventions:  - Encourage patient to monitor pain and request assistance  - Assess pain using appropriate pain scale  - Administer analgesics based on type and severity of pain and evaluate response  - Implement non-pharmacological measures as appropriate and evaluate response  - Consider cultural and social influences on pain and pain management  - Notify physician/advanced practitioner if interventions unsuccessful or patient reports new pain  3/7/2023 1800 by [employer] [employer]  Outcome: Adequate for Discharge  3/7/2023 1315 by [employer]lette [employer]  Outcome: Progressing     Problem: INFECTION - ADULT  Goal: Absence or prevention of progression during hospitalization  Description: INTERVENTIONS:  - Assess and monitor for signs and symptoms of infection  - Monitor lab/diagnostic results  - Monitor all insertion sites, i e  indwelling lines, tubes, and drains  - Monitor endotracheal if appropriate and nasal secretions for changes in amount and color  - [place] appropriate cooling/warming therapies per order  - Administer medications as ordered  - Instruct and encourage patient and family to use good hand hygiene technique  - Identify and instruct in appropriate isolation precautions for identified infection/condition  3/7/2023 1800 by Collette [employer]  Outcome: Adequate for Discharge  3/7/2023 1315 by [employer]lette [employer]  Outcome: Progressing  Goal: Absence of fever/infection during neutropenic period  Description: INTERVENTIONS:  - Monitor WBC    3/7/2023 1800 by [employer]  Outcome: Adequate for Discharge  3/7/2023 1315 by Collette [employer]  Outcome: Progressing     Problem: SAFETY ADULT  Goal: Patient will remain free of falls  Description: INTERVENTIONS:  - Educate patient/family on patient safety including physical limitations  - Instruct patient to call for assistance with activity   - Consult OT/PT to assist with strengthening/mobility   - Keep Call bell within reach  - Keep bed low and locked with side rails adjusted as appropriate  - Keep care items and personal belongings within reach  - Initiate and maintain comfort rounds  - Make Fall Risk Sign visible to staff  - Offer Toileting every  Hours, in advance of need  - Initiate/Maintain alarm  - Obtain necessary fall risk management equipment:   - Apply yellow socks and bracelet for high fall risk patients  - Consider moving patient to room near nurses station  3/7/2023 1800 by [facility]  Outcome: Adequate for Discharge  3/7/2023 1315 by Carolina Carrington  Outcome: Progressing  Goal: Maintain or return to baseline ADL function  Description: INTERVENTIONS:  -  Assess patient's ability to carry out ADLs; assess patient's baseline for ADL function and identify physical deficits which impact ability to perform ADLs (bathing, care of mouth/teeth, toileting, grooming, dressing, etc )  - Assess/evaluate cause of self-care deficits   - Assess range of motion  - Assess patient's mobility; develop plan if impaired  - Assess patient's need for assistive devices and provide as appropriate  - Encourage maximum independence but intervene and supervise when necessary  - Involve family in performance of ADLs  - Assess for home care needs following discharge   - Consider OT consult to assist with ADL evaluation and planning for discharge  - Provide patient education as appropriate  3/7/2023 1800 by Carolina Carrington  Outcome: Adequate for Discharge  3/7/2023 1315 by Carolina Carrington  Outcome: Progressing  Goal: Maintains/Returns to pre admission functional level  Description: INTERVENTIONS:  - Perform BMAT or MOVE assessment daily    - Set and communicate daily mobility goal to care team and patient/family/caregiver  - Collaborate with rehabilitation services on mobility goals if consulted  - Perform Range of Motion  times a day  - Reposition patient every  hours    - Dangle patient  times a day  - Stand patient  times a day  - Ambulate patient  times a day  - Out of bed to chair  times a day   - Out of bed for meals times a day  - Out of bed for toileting  - Record patient progress and toleration of activity level   3/7/2023 1800 by Carolina Carrington  Outcome: Adequate for Discharge  3/7/2023 1315 by Carolina Carrington  Outcome: Progressing     Problem: Knowledge Deficit  Goal: Patient/family/caregiver demonstrates understanding of disease process, treatment plan, medications, and discharge instructions  Description: Complete learning assessment and assess knowledge base    Interventions:  - Provide teaching at level of understanding  - Provide teaching via preferred learning methods  3/7/2023 1800 by Eun Mckinney  Outcome: Adequate for Discharge  3/7/2023 1315 by Eun Mckinney  Outcome: Progressing     Problem: DISCHARGE PLANNING  Goal: Discharge to home or other facility with appropriate resources  Description: INTERVENTIONS:  - Identify barriers to discharge w/patient and caregiver  - Arrange for needed discharge resources and transportation as appropriate  - Identify discharge learning needs (meds, wound care, etc )  - Arrange for interpretive services to assist at discharge as needed  - Refer to Case Management Department for coordinating discharge planning if the patient needs post-hospital services based on physician/advanced practitioner order or complex needs related to functional status, cognitive ability, or social support system  3/7/2023 1800 by Eun Mckinney  Outcome: Adequate for Discharge  3/7/2023 1315 by Eun Mckinney  Outcome: Progressing

## 2023-03-07 NOTE — PLAN OF CARE
Problem: POSTPARTUM  Goal: Experiences normal postpartum course  Description: INTERVENTIONS:  - Monitor maternal vital signs  - Assess uterine involution and lochia  Outcome: Progressing  Goal: Appropriate maternal -  bonding  Description: INTERVENTIONS:  - Identify family support  - Assess for appropriate maternal/infant bonding   -Encourage maternal/infant bonding opportunities  - Referral to  or  as needed  Outcome: Progressing  Goal: Establishment of infant feeding pattern  Description: INTERVENTIONS:  - Assess breast/bottle feeding  - Refer to lactation as needed  Outcome: Progressing  Goal: Incision(s), wounds(s) or drain site(s) healing without S/S of infection  Description: INTERVENTIONS  - Assess and document dressing, incision, wound bed, drain sites and surrounding tissue  - Provide patient and family education  - Perform skin care/dressing changes every   Outcome: Progressing     Problem: PAIN - ADULT  Goal: Verbalizes/displays adequate comfort level or baseline comfort level  Description: Interventions:  - Encourage patient to monitor pain and request assistance  - Assess pain using appropriate pain scale  - Administer analgesics based on type and severity of pain and evaluate response  - Implement non-pharmacological measures as appropriate and evaluate response  - Consider cultural and social influences on pain and pain management  - Notify physician/advanced practitioner if interventions unsuccessful or patient reports new pain  Outcome: Progressing     Problem: INFECTION - ADULT  Goal: Absence or prevention of progression during hospitalization  Description: INTERVENTIONS:  - Assess and monitor for signs and symptoms of infection  - Monitor lab/diagnostic results  - Monitor all insertion sites, i e  indwelling lines, tubes, and drains  - Monitor endotracheal if appropriate and nasal secretions for changes in amount and color  - [place] appropriate cooling/warming therapies per order  - Administer medications as ordered  - Instruct and encourage patient and family to use good hand hygiene technique  - Identify and instruct in appropriate isolation precautions for identified infection/condition  Outcome: Progressing  Goal: Absence of fever/infection during neutropenic period  Description: INTERVENTIONS:  - Monitor WBC    Outcome: Progressing     Problem: SAFETY ADULT  Goal: Patient will remain free of falls  Description: INTERVENTIONS:  - Educate patient/family on patient safety including physical limitations  - Instruct patient to call for assistance with activity   - Consult OT/PT to assist with strengthening/mobility   - Keep Call bell within reach  - Keep bed low and locked with side rails adjusted as appropriate  - Keep care items and personal belongings within reach  - Initiate and maintain comfort rounds  - Make Fall Risk Sign visible to staff  - Offer Toileting every  Hours, in advance of need  - Initiate/Maintain alarm  - Obtain necessary fall risk management equipment:   - Apply yellow socks and bracelet for high fall risk patients  - Consider moving patient to room near nurses station  Outcome: Progressing  Goal: Maintain or return to baseline ADL function  Description: INTERVENTIONS:  -  Assess patient's ability to carry out ADLs; assess patient's baseline for ADL function and identify physical deficits which impact ability to perform ADLs (bathing, care of mouth/teeth, toileting, grooming, dressing, etc )  - Assess/evaluate cause of self-care deficits   - Assess range of motion  - Assess patient's mobility; develop plan if impaired  - Assess patient's need for assistive devices and provide as appropriate  - Encourage maximum independence but intervene and supervise when necessary  - Involve family in performance of ADLs  - Assess for home care needs following discharge   - Consider OT consult to assist with ADL evaluation and planning for discharge  - Provide patient education as appropriate  Outcome: Progressing  Goal: Maintains/Returns to pre admission functional level  Description: INTERVENTIONS:  - Perform BMAT or MOVE assessment daily    - Set and communicate daily mobility goal to care team and patient/family/caregiver  - Collaborate with rehabilitation services on mobility goals if consulted  - Perform Range of Motion  times a day  - Reposition patient every  hours  - Dangle patient  times a day  - Stand patient  times a day  - Ambulate patient  times a day  - Out of bed to chair  times a day   - Out of bed for meals times a day  - Out of bed for toileting  - Record patient progress and toleration of activity level   Outcome: Progressing     Problem: Knowledge Deficit  Goal: Patient/family/caregiver demonstrates understanding of disease process, treatment plan, medications, and discharge instructions  Description: Complete learning assessment and assess knowledge base    Interventions:  - Provide teaching at level of understanding  - Provide teaching via preferred learning methods  Outcome: Progressing     Problem: DISCHARGE PLANNING  Goal: Discharge to home or other facility with appropriate resources  Description: INTERVENTIONS:  - Identify barriers to discharge w/patient and caregiver  - Arrange for needed discharge resources and transportation as appropriate  - Identify discharge learning needs (meds, wound care, etc )  - Arrange for interpretive services to assist at discharge as needed  - Refer to Case Management Department for coordinating discharge planning if the patient needs post-hospital services based on physician/advanced practitioner order or complex needs related to functional status, cognitive ability, or social support system  Outcome: Progressing

## 2023-03-07 NOTE — CASE MANAGEMENT
Case Management Progress Note    Patient name Real Falling  Location /-41 MRN [id number]  : 1995 Date 3/7/2023       LOS (days): 2  Geometric Mean LOS (GMLOS) (days):   Days to GMLOS:        OBJECTIVE:        Current admission status: Inpatient  [facility] Pharm[facility]   [facility] , [address] [place] [id number]  Phone: [phone] Fax: [phone]    Primary Care Provider: No primary care provider on file  Primary Insurance: [address] [place] [address]  Secondary Insurance:     PROGRESS NOTE:    CM consulted re: mother requesting spectra S2 breast pump  Order entered in parachute  Delivered bedside by [facility] liaison  Chart screened, no other CM needs at this time  Plan to D/C home when medically cleared

## 2023-03-08 NOTE — UTILIZATION REVIEW
NOTIFICATION OF INPATIENT ADMISSION   MATERNITY/DELIVERY AUTHORIZATION REQUEST   SERVICING FACILITY:   [facility] - L&D, , NICU  [address] [facility], [address] [place] [address]  Tax ID: [id number]  NPI: [id number]   ATTENDING PROVIDER:  Attending Name and NPI#: Tracy Foster [[id number]]  Address: [address] [place] [address]  Phone: [phone]   ADMISSION INFORMATION:  Place of Service: Inpatient [address] [facility]  [address]  Place of Service Code: 21  Inpatient Admission Date/Time: 3/5/23  8:46 PM  Discharge Date/Time: 3/7/2023  6:20 PM  Admitting Diagnosis Code/Description:  Encounter for induction of labor [Z34 90]  Encounter for full-term uncomplicated delivery [Q25]     Mother: Rios Darby 1995 Estimated Date of Delivery: 3/4/23  Delivering clinician: Hanh Holguin    OB History        4    Para   3    Term   3            AB   1    Living   3       SAB        IAB        Ectopic        Multiple   0    Live Births   3                Name & MRN:   Information for the patient's :  Roxi Ovalles Dexter) [[id number]]     Rose Delivery Information:  Sex: female  Delivered 3/6/2023 2:58 PM by Vaginal, Spontaneous; Gestational Age: 41w4d    Rose Measurements:  Weight: 7 lb 5 6 oz (3335 g); Height: 19 5"    APGAR 1 minute 5 minutes 10 minutes   Totals: 9 9       Birth Information: 28 y o  female MRN: [id number] Unit/Bed#: -01   Birthweight: No birth weight on file  Gestational Age: <None> Delivery Type:    APGARS Totals:        UTILIZATION REVIEW CONTACT:  Ben Hernandez Utilization   Network Utilization Review Department  Phone: [phone]  Fax [phone]  Email: [email or web address]  Contact for approvals/pending authorizations, clinical reviews, and discharge       PHYSICIAN ADVISORY SERVICES:  Medical Necessity Denial & [facility] Review  Phone: [phone]  Fax: [phone]  Email: [email or web address]

## 2023-03-12 LAB — PLACENTA IN STORAGE: NORMAL

## 2023-04-25 NOTE — PROGRESS NOTES
Assessment/Plan:  Laparoscopic bilateral salpingectomy  or   Instructions given, Abstinence  RTO 1 wk post op      LTL/Salpingectomy    Laparoscopic tubal ligation and bilateral salpingectomy were discussed in detail including the benefits, risks, alternatives, and recovery  Laparoscopic surgery is considered major surgery due to the proximity of vital organs  Risk include anesthesia, infection, damage to internal structures such as bowel, blood vessels, bladder, ureters, and nerves  Allergic reactions to medications are possible along with stirrup related paresthesias  There is possible reversal with a tubal ligation but not with salpingectomy  The failure rate of ligation is 1000 and Salpingectomy would be even less  Both reduce the risk of ovarian cancer in the future but salpingectomy is considered more effective  It takes a little longer to perform with small additional risks  Vasectomy and LARCs are just as effective and safer than tubal ligation  Diagnoses and all orders for this visit:    Consultation for female sterilization              Subjective:        Patient ID: Ericka Escalona is a 28 y o  female  Ms Souza So presents today desiring permanent sterilization  She is a 54-year-old  4 para 3 female who is 7 weeks postpartum  Her partner declines having a vasectomy  She conceived on a ParaGard IUD with her third pregnancy which ended in an  in  and then conceived on oral contraceptives with her most recent pregnancy  She declines any further long-acting reversible contraceptives  She is positive that her family is completed  She is breast-feeding  She is currently taking prenatal vitamins and iron therapy  Her depression scale today is rated at a 4  She has only had intercourse once since delivery that was approximately 3 weeks ago  Condoms were used  Abstinence has been practiced since in preparation for the sterilization   She has not had a menses since delivery  The following portions of the patient's history were reviewed and updated as appropriate: She  has a past medical history of Anemia, Migraine, and Varicella  Patient Active Problem List    Diagnosis Date Noted   •  (spontaneous vaginal delivery) 2023   • Sterilization consult 2023   • 40 weeks gestation of pregnancy 2023   • COVID-19 vaccine series declined 2023   • Abnormal glucose affecting pregnancy 2022   • Back pain affecting pregnancy 2022   • Anemia affecting pregnancy in third trimester 10/18/2022   • History of marijuana use 10/17/2022   PMH:  Menarche 13  G4, P3; SAVD ' F 8-15,  F 7-3 7, VIP  Conceived on Paragard, 3/6/23 F 40w2d IOL - conceived on OC's  She  has a past surgical history that includes Induced   Her family history includes Diabetes in her maternal grandmother; Heart disease in her father; No Known Problems in her brother, maternal grandfather, mother, paternal grandfather, paternal grandmother, and sister  FH:  F - CAD, MI, CABG  MGM - DM  She  reports that she has never smoked  She has never used smokeless tobacco  She reports that she does not currently use alcohol  She reports that she does not use drugs  SH:  Emily Zavala is her partner of 4 years    Current Outpatient Medications   Medication Sig Dispense Refill   • Acetaminophen (TYLENOL 8 HOUR PO) Take by mouth (Patient not taking: Reported on 2023)     • acetaminophen (TYLENOL) 325 mg tablet Take 2 tablets (650 mg total) by mouth every 6 (six) hours as needed for headaches, fever or moderate pain 30 tablet 0   • docusate sodium (COLACE) 100 mg capsule Take 1 capsule (100 mg total) by mouth 2 (two) times a day (Patient not taking: Reported on 2023)  0   • ferrous sulfate 325 (65 Fe) mg tablet Take 325 mg by mouth daily with breakfast     • ibuprofen (MOTRIN) 600 mg tablet Take 1 tablet (600 mg total) by mouth every 6 (six) hours as needed for mild pain, moderate pain, fever or headaches (Patient not taking: Reported on 4/12/2023) 30 tablet 0   • norethindrone (Ortho Micronor) 0 35 MG tablet Take 1 tablet (0 35 mg total) by mouth daily 90 tablet 4   • nystatin-triamcinolone (MYCOLOG-II) ointment Apply topically 2 (two) times a day (Patient not taking: Reported on 4/12/2023) 30 g 0   • Prenatal Vit-Fe Fumarate-FA (PRENATAL VITAMIN PO) Take by mouth       No current facility-administered medications for this visit  Current Outpatient Medications on File Prior to Visit   Medication Sig   • Acetaminophen (TYLENOL 8 HOUR PO) Take by mouth (Patient not taking: Reported on 4/12/2023)   • acetaminophen (TYLENOL) 325 mg tablet Take 2 tablets (650 mg total) by mouth every 6 (six) hours as needed for headaches, fever or moderate pain   • docusate sodium (COLACE) 100 mg capsule Take 1 capsule (100 mg total) by mouth 2 (two) times a day (Patient not taking: Reported on 4/12/2023)   • ferrous sulfate 325 (65 Fe) mg tablet Take 325 mg by mouth daily with breakfast   • ibuprofen (MOTRIN) 600 mg tablet Take 1 tablet (600 mg total) by mouth every 6 (six) hours as needed for mild pain, moderate pain, fever or headaches (Patient not taking: Reported on 4/12/2023)   • norethindrone (Ortho Micronor) 0 35 MG tablet Take 1 tablet (0 35 mg total) by mouth daily   • nystatin-triamcinolone (MYCOLOG-II) ointment Apply topically 2 (two) times a day (Patient not taking: Reported on 4/12/2023)   • Prenatal Vit-Fe Fumarate-FA (PRENATAL VITAMIN PO) Take by mouth     No current facility-administered medications on file prior to visit  She has No Known Allergies       Review of Systems   Constitutional: Negative for activity change, appetite change, fatigue and unexpected weight change  Eyes: Negative for visual disturbance  Respiratory: Negative for cough, chest tightness, shortness of breath and wheezing  Cardiovascular: Negative for chest pain, palpitations and leg swelling  "    Breast: Patient denies tenderness, nipple discharge, masses, or erythema  Gastrointestinal: Negative for abdominal distention, abdominal pain, blood in stool, constipation, diarrhea, nausea and vomiting  Endocrine: Negative for cold intolerance and heat intolerance  Genitourinary: Negative for decreased urine volume, difficulty urinating, dyspareunia, dysuria, frequency, hematuria, menstrual problem, pelvic pain, urgency, vaginal bleeding, vaginal discharge and vaginal pain  Musculoskeletal: Positive for back pain  Negative for arthralgias  Skin: Negative for rash  Neurological: Negative for weakness, light-headedness, numbness and headaches  Hematological: Does not bruise/bleed easily  Psychiatric/Behavioral: Negative for agitation, behavioral problems and sleep disturbance  The patient is not nervous/anxious  Objective:    Vitals:    04/26/23 1014   BP: 110/80   BP Location: Left arm   Patient Position: Sitting   Cuff Size: Standard   Weight: 87 4 kg (192 lb 9 6 oz)   Height: 5' 5\" (1 651 m)            Physical Exam  Vitals and nursing note reviewed  Exam conducted with a chaperone present  Constitutional:       General: She is not in acute distress  Appearance: She is well-developed  HENT:      Head: Normocephalic and atraumatic  Eyes:      General: No scleral icterus  Right eye: No discharge  Left eye: No discharge  Extraocular Movements: Extraocular movements intact  Conjunctiva/sclera: Conjunctivae normal    Neck:      Thyroid: No thyromegaly  Trachea: No tracheal deviation  Cardiovascular:      Rate and Rhythm: Normal rate and regular rhythm  Heart sounds: Normal heart sounds  No murmur heard  Pulmonary:      Effort: Pulmonary effort is normal  No respiratory distress  Breath sounds: Normal breath sounds  No wheezing     Chest:   Breasts:     Breasts are symmetrical       Right: No inverted nipple, mass, nipple discharge, skin " change or tenderness  Left: No inverted nipple, mass, nipple discharge, skin change or tenderness  Abdominal:      General: Abdomen is flat  Bowel sounds are normal  There is no distension  Palpations: Abdomen is soft  There is no mass  Tenderness: There is no abdominal tenderness  There is no guarding or rebound  Comments: Redundant skin from recent pregnancy  Genitourinary:     General: Normal vulva  Labia:         Right: No rash, tenderness or lesion  Left: No rash, tenderness or lesion  Vagina: Normal       Cervix: No cervical motion tenderness or discharge  Uterus: Not deviated, not enlarged and not tender  Adnexa:         Right: No mass, tenderness or fullness  Left: No mass, tenderness or fullness  Rectum: No external hemorrhoid  Comments: Urethral meatus within normal limits  Perineum within normal limits  Bladder well supported  The uterus is top normal size  Musculoskeletal:         General: No tenderness  Normal range of motion  Cervical back: Normal range of motion and neck supple  Lymphadenopathy:      Cervical: No cervical adenopathy  Skin:     General: Skin is warm and dry  Neurological:      Mental Status: She is alert and oriented to person, place, and time  Psychiatric:         Behavior: Behavior normal          Thought Content:  Thought content normal          Judgment: Judgment normal

## 2023-04-25 NOTE — H&P (VIEW-ONLY)
Assessment/Plan:  Laparoscopic bilateral salpingectomy  or   Instructions given, Abstinence  RTO 1 wk post op      LTL/Salpingectomy    Laparoscopic tubal ligation and bilateral salpingectomy were discussed in detail including the benefits, risks, alternatives, and recovery  Laparoscopic surgery is considered major surgery due to the proximity of vital organs  Risk include anesthesia, infection, damage to internal structures such as bowel, blood vessels, bladder, ureters, and nerves  Allergic reactions to medications are possible along with stirrup related paresthesias  There is possible reversal with a tubal ligation but not with salpingectomy  The failure rate of ligation is 1000 and Salpingectomy would be even less  Both reduce the risk of ovarian cancer in the future but salpingectomy is considered more effective  It takes a little longer to perform with small additional risks  Vasectomy and LARCs are just as effective and safer than tubal ligation  Diagnoses and all orders for this visit:    Consultation for female sterilization              Subjective:        Patient ID: Kera Mom is a 28 y o  female  Ms Corin Gonzalez presents today desiring permanent sterilization  She is a 41-year-old  4 para 3 female who is 7 weeks postpartum  Her partner declines having a vasectomy  She conceived on a ParaGard IUD with her third pregnancy which ended in an  in  and then conceived on oral contraceptives with her most recent pregnancy  She declines any further long-acting reversible contraceptives  She is positive that her family is completed  She is breast-feeding  She is currently taking prenatal vitamins and iron therapy  Her depression scale today is rated at a 4  She has only had intercourse once since delivery that was approximately 3 weeks ago  Condoms were used  Abstinence has been practiced since in preparation for the sterilization   She has not had a menses since delivery  The following portions of the patient's history were reviewed and updated as appropriate: She  has a past medical history of Anemia, Migraine, and Varicella  Patient Active Problem List    Diagnosis Date Noted   •  (spontaneous vaginal delivery) 2023   • Sterilization consult 2023   • 40 weeks gestation of pregnancy 2023   • COVID-19 vaccine series declined 2023   • Abnormal glucose affecting pregnancy 2022   • Back pain affecting pregnancy 2022   • Anemia affecting pregnancy in third trimester 10/18/2022   • History of marijuana use 10/17/2022   PMH:  Menarche 13  G4, P3; SAVD ' F 8-15,  F 7-3 7, VIP  Conceived on Paragard, 3/6/23 F 40w2d IOL - conceived on OC's  She  has a past surgical history that includes Induced   Her family history includes Diabetes in her maternal grandmother; Heart disease in her father; No Known Problems in her brother, maternal grandfather, mother, paternal grandfather, paternal grandmother, and sister  FH:  F - CAD, MI, CABG  MGM - DM  She  reports that she has never smoked  She has never used smokeless tobacco  She reports that she does not currently use alcohol  She reports that she does not use drugs  SH:  Della Presumaram is her partner of 4 years    Current Outpatient Medications   Medication Sig Dispense Refill   • Acetaminophen (TYLENOL 8 HOUR PO) Take by mouth (Patient not taking: Reported on 2023)     • acetaminophen (TYLENOL) 325 mg tablet Take 2 tablets (650 mg total) by mouth every 6 (six) hours as needed for headaches, fever or moderate pain 30 tablet 0   • docusate sodium (COLACE) 100 mg capsule Take 1 capsule (100 mg total) by mouth 2 (two) times a day (Patient not taking: Reported on 2023)  0   • ferrous sulfate 325 (65 Fe) mg tablet Take 325 mg by mouth daily with breakfast     • ibuprofen (MOTRIN) 600 mg tablet Take 1 tablet (600 mg total) by mouth every 6 (six) hours as needed for mild pain, moderate pain, fever or headaches (Patient not taking: Reported on 4/12/2023) 30 tablet 0   • norethindrone (Ortho Micronor) 0 35 MG tablet Take 1 tablet (0 35 mg total) by mouth daily 90 tablet 4   • nystatin-triamcinolone (MYCOLOG-II) ointment Apply topically 2 (two) times a day (Patient not taking: Reported on 4/12/2023) 30 g 0   • Prenatal Vit-Fe Fumarate-FA (PRENATAL VITAMIN PO) Take by mouth       No current facility-administered medications for this visit  Current Outpatient Medications on File Prior to Visit   Medication Sig   • Acetaminophen (TYLENOL 8 HOUR PO) Take by mouth (Patient not taking: Reported on 4/12/2023)   • acetaminophen (TYLENOL) 325 mg tablet Take 2 tablets (650 mg total) by mouth every 6 (six) hours as needed for headaches, fever or moderate pain   • docusate sodium (COLACE) 100 mg capsule Take 1 capsule (100 mg total) by mouth 2 (two) times a day (Patient not taking: Reported on 4/12/2023)   • ferrous sulfate 325 (65 Fe) mg tablet Take 325 mg by mouth daily with breakfast   • ibuprofen (MOTRIN) 600 mg tablet Take 1 tablet (600 mg total) by mouth every 6 (six) hours as needed for mild pain, moderate pain, fever or headaches (Patient not taking: Reported on 4/12/2023)   • norethindrone (Ortho Micronor) 0 35 MG tablet Take 1 tablet (0 35 mg total) by mouth daily   • nystatin-triamcinolone (MYCOLOG-II) ointment Apply topically 2 (two) times a day (Patient not taking: Reported on 4/12/2023)   • Prenatal Vit-Fe Fumarate-FA (PRENATAL VITAMIN PO) Take by mouth     No current facility-administered medications on file prior to visit  She has No Known Allergies       Review of Systems   Constitutional: Negative for activity change, appetite change, fatigue and unexpected weight change  Eyes: Negative for visual disturbance  Respiratory: Negative for cough, chest tightness, shortness of breath and wheezing  Cardiovascular: Negative for chest pain, palpitations and leg swelling  "    Breast: Patient denies tenderness, nipple discharge, masses, or erythema  Gastrointestinal: Negative for abdominal distention, abdominal pain, blood in stool, constipation, diarrhea, nausea and vomiting  Endocrine: Negative for cold intolerance and heat intolerance  Genitourinary: Negative for decreased urine volume, difficulty urinating, dyspareunia, dysuria, frequency, hematuria, menstrual problem, pelvic pain, urgency, vaginal bleeding, vaginal discharge and vaginal pain  Musculoskeletal: Positive for back pain  Negative for arthralgias  Skin: Negative for rash  Neurological: Negative for weakness, light-headedness, numbness and headaches  Hematological: Does not bruise/bleed easily  Psychiatric/Behavioral: Negative for agitation, behavioral problems and sleep disturbance  The patient is not nervous/anxious  Objective:    Vitals:    04/26/23 1014   BP: 110/80   BP Location: Left arm   Patient Position: Sitting   Cuff Size: Standard   Weight: 87 4 kg (192 lb 9 6 oz)   Height: 5' 5\" (1 651 m)            Physical Exam  Vitals and nursing note reviewed  Exam conducted with a chaperone present  Constitutional:       General: She is not in acute distress  Appearance: She is well-developed  HENT:      Head: Normocephalic and atraumatic  Eyes:      General: No scleral icterus  Right eye: No discharge  Left eye: No discharge  Extraocular Movements: Extraocular movements intact  Conjunctiva/sclera: Conjunctivae normal    Neck:      Thyroid: No thyromegaly  Trachea: No tracheal deviation  Cardiovascular:      Rate and Rhythm: Normal rate and regular rhythm  Heart sounds: Normal heart sounds  No murmur heard  Pulmonary:      Effort: Pulmonary effort is normal  No respiratory distress  Breath sounds: Normal breath sounds  No wheezing     Chest:   Breasts:     Breasts are symmetrical       Right: No inverted nipple, mass, nipple discharge, skin " change or tenderness  Left: No inverted nipple, mass, nipple discharge, skin change or tenderness  Abdominal:      General: Abdomen is flat  Bowel sounds are normal  There is no distension  Palpations: Abdomen is soft  There is no mass  Tenderness: There is no abdominal tenderness  There is no guarding or rebound  Comments: Redundant skin from recent pregnancy  Genitourinary:     General: Normal vulva  Labia:         Right: No rash, tenderness or lesion  Left: No rash, tenderness or lesion  Vagina: Normal       Cervix: No cervical motion tenderness or discharge  Uterus: Not deviated, not enlarged and not tender  Adnexa:         Right: No mass, tenderness or fullness  Left: No mass, tenderness or fullness  Rectum: No external hemorrhoid  Comments: Urethral meatus within normal limits  Perineum within normal limits  Bladder well supported  The uterus is top normal size  Musculoskeletal:         General: No tenderness  Normal range of motion  Cervical back: Normal range of motion and neck supple  Lymphadenopathy:      Cervical: No cervical adenopathy  Skin:     General: Skin is warm and dry  Neurological:      Mental Status: She is alert and oriented to person, place, and time  Psychiatric:         Behavior: Behavior normal          Thought Content:  Thought content normal          Judgment: Judgment normal

## 2023-04-26 ENCOUNTER — OFFICE VISIT (OUTPATIENT)
Dept: OBGYN CLINIC | Facility: CLINIC | Age: 28
End: 2023-04-26

## 2023-04-26 VITALS
DIASTOLIC BLOOD PRESSURE: 80 MMHG | HEIGHT: 65 IN | WEIGHT: 192.6 LBS | SYSTOLIC BLOOD PRESSURE: 110 MMHG | BODY MASS INDEX: 32.09 KG/M2

## 2023-04-26 DIAGNOSIS — Z30.09 CONSULTATION FOR FEMALE STERILIZATION: Primary | ICD-10-CM

## 2023-05-03 ENCOUNTER — APPOINTMENT (OUTPATIENT)
Dept: LAB | Facility: HOSPITAL | Age: 28
End: 2023-05-03

## 2023-05-03 DIAGNOSIS — Z01.818 PRE-OP TESTING: ICD-10-CM

## 2023-05-03 LAB
ANION GAP SERPL CALCULATED.3IONS-SCNC: 8 MMOL/L (ref 4–13)
BUN SERPL-MCNC: 17 MG/DL (ref 5–25)
CALCIUM SERPL-MCNC: 9.5 MG/DL (ref 8.4–10.2)
CHLORIDE SERPL-SCNC: 104 MMOL/L (ref 96–108)
CO2 SERPL-SCNC: 30 MMOL/L (ref 21–32)
CREAT SERPL-MCNC: 0.82 MG/DL (ref 0.6–1.3)
ERYTHROCYTE [DISTWIDTH] IN BLOOD BY AUTOMATED COUNT: 15.5 % (ref 11.6–15.1)
GFR SERPL CREATININE-BSD FRML MDRD: 97 ML/MIN/1.73SQ M
GLUCOSE P FAST SERPL-MCNC: 98 MG/DL (ref 65–99)
HCT VFR BLD AUTO: 40.2 % (ref 34.8–46.1)
HGB BLD-MCNC: 12.7 G/DL (ref 11.5–15.4)
MCH RBC QN AUTO: 27.6 PG (ref 26.8–34.3)
MCHC RBC AUTO-ENTMCNC: 31.6 G/DL (ref 31.4–37.4)
MCV RBC AUTO: 87 FL (ref 82–98)
PLATELET # BLD AUTO: 279 THOUSANDS/UL (ref 149–390)
PMV BLD AUTO: 9.8 FL (ref 8.9–12.7)
POTASSIUM SERPL-SCNC: 4 MMOL/L (ref 3.5–5.3)
RBC # BLD AUTO: 4.6 MILLION/UL (ref 3.81–5.12)
SODIUM SERPL-SCNC: 142 MMOL/L (ref 135–147)
WBC # BLD AUTO: 7.79 THOUSAND/UL (ref 4.31–10.16)

## 2023-05-05 NOTE — PRE-PROCEDURE INSTRUCTIONS
Pre-Surgery Instructions:   Medication Instructions   • acetaminophen (TYLENOL) 325 mg tablet Hold day of surgery     • ferrous sulfate 325 (65 Fe) mg tablet Hold this medication for 7 days before surgery     • norethindrone (Ortho Micronor) 0 35 MG tablet Hold day of surgery     • Prenatal Vit-Fe Fumarate-FA (PRENATAL VITAMIN PO) Hold this medication for 7 days before surgery      Medication instructions for day surgery reviewed  Please use only a sip of water to take your instructed medications  Avoid all over the counter vitamins, supplements and NSAIDS for one week prior to surgery per anesthesia guidelines  Tylenol is ok to take as needed  You will receive a call one business day prior to surgery with an arrival time and hospital directions  If your surgery is scheduled on a Monday, the hospital will be calling you on the Friday prior to your surgery  If you have not heard from anyone by 8pm, please call the hospital supervisor through the hospital  at [phone]  Alexandro Amarilis [phone])  Do not eat or drink anything after midnight the night before your surgery, including candy, mints, lifesavers, or chewing gum  Do not drink alcohol 24hrs before your surgery  Try not to smoke at least 24hrs before your surgery  Follow the pre surgery showering instructions as listed in the [facility] Surgical Experience Booklet” or otherwise provided by your surgeon's office  Do not shave the surgical area 24 hours before surgery  Do not apply any lotions, creams, including makeup, cologne, deodorant, or perfumes after showering on the day of your surgery  No contact lenses, eye make-up, or artificial eyelashes  Remove nail polish, including gel polish, and any artificial, gel, or acrylic nails if possible  Remove all jewelry including rings and body piercing jewelry  Wear causal clothing that is easy to take on and off  Consider your type of surgery  Keep any valuables, jewelry, piercings at home  Please bring any specially ordered equipment (sling, braces) if indicated  Arrange for a responsible person to drive you to and from the hospital on the day of your surgery  Visitor Guidelines discussed  Call the surgeon's office with any new illnesses, exposures, or additional questions prior to surgery  Please reference your [facility] Surgical Experience Booklet” for additional information to prepare for your upcoming surgery

## 2023-05-11 ENCOUNTER — ANESTHESIA EVENT (OUTPATIENT)
Dept: PERIOP | Facility: HOSPITAL | Age: 28
End: 2023-05-11

## 2023-05-11 NOTE — ANESTHESIA PREPROCEDURE EVALUATION
Procedure:  LAPAROSCOPIC BILATERAL SALPINGECTOMY AND ANY OTHER REQUIRED PROCEDURE (Bilateral: Abdomen)    Relevant Problems   GYN   (+) 40 weeks gestation of pregnancy      HEMATOLOGY   (+) Anemia affecting pregnancy in third trimester      NEURO/PSYCH   (+) History of marijuana use      Component Ref Range & Units 5/3/23 0713 3/5/23 2145 2/11/23 1043 12/17/22 0926 11/19/22 0952 10/15/22 0857   WBC 4 31 - 10 16 Thousand/uL 7 79  11 95 High   8 31  8 96  8 05  7 33    RBC 3 81 - 5 12 Million/uL 4 60  3 28 Low   3 74 Low   3 67 Low   3 38 Low   3 75 Low     Hemoglobin 11 5 - 15 4 g/dL 12 7  9 0 Low   10 3 Low   9 8 Low   9 0 Low   9 9 Low     Hematocrit 34 8 - 46 1 % 40 2  28 2 Low   32 5 Low   32 8 Low   28 9 Low   31 0 Low     MCV 82 - 98 fL 87  86  87  89  86  83    MCH 26 8 - 34 3 pg 27 6  27 4  27 5  26 7 Low   26 6 Low   26 4 Low     MCHC 31 4 - 37 4 g/dL 31 6  31 9  31 7  29 9 Low   31 1 Low   31 9    RDW 11 6 - 15 1 % 15 5 High   14 6  14 9  18 0 High   15 5 High   14 8    Platelets 812 - 269 Thousands/uL 279  260  267  255  277  312    MPV 8 9 - 12 7 fL 9 8  10 0  9 4  9 8  9 7  9 7      Component Ref Range & Units 5/3/23 0713 1/14/23 0737 12/20/22 0838   Sodium 135 - 147 mmol/L 142      Potassium 3 5 - 5 3 mmol/L 4 0      Chloride 96 - 108 mmol/L 104      CO2 21 - 32 mmol/L 30      ANION GAP 4 - 13 mmol/L 8      BUN 5 - 25 mg/dL 17      Creatinine 0 60 - 1 30 mg/dL 0 82      Comment: Standardized to IDMS reference method   Glucose, Fasting 65 - 99 mg/dL 98  91 R, CM  103 High  R, CM    Calcium 8 4 - 10 2 mg/dL 9 5      eGFR ml/min/1 73sq m 97          Physical Exam    Airway    Mallampati score: I  TM Distance: >3 FB  Neck ROM: full     Dental       Cardiovascular      Pulmonary      Other Findings        Anesthesia Plan  ASA Score- 1     Anesthesia Type- general with ASA Monitors  Additional Monitors:   Airway Plan: ETT  Plan Factors-    Chart reviewed  Existing labs reviewed   Patient summary reviewed  Patient is not a current smoker  Patient did not smoke on day of surgery  Induction- intravenous  Postoperative Plan- Plan for postoperative opioid use  Planned trial extubation    Informed Consent- Anesthetic plan and risks discussed with patient  I personally reviewed this patient with the CRNA  Discussed and agreed on the Anesthesia Plan with the CRNA  Chelly Farris

## 2023-05-12 ENCOUNTER — ANESTHESIA (OUTPATIENT)
Dept: PERIOP | Facility: HOSPITAL | Age: 28
End: 2023-05-12

## 2023-05-12 ENCOUNTER — HOSPITAL ENCOUNTER (OUTPATIENT)
Facility: HOSPITAL | Age: 28
Setting detail: OUTPATIENT SURGERY
Discharge: HOME | End: 2023-05-12
Attending: OBSTETRICS & GYNECOLOGY | Admitting: OBSTETRICS & GYNECOLOGY

## 2023-05-12 VITALS
OXYGEN SATURATION: 94 % | BODY MASS INDEX: 30.86 KG/M2 | TEMPERATURE: 96.7 F | HEIGHT: 66 IN | WEIGHT: 192 LBS | HEART RATE: 72 BPM | SYSTOLIC BLOOD PRESSURE: 106 MMHG | DIASTOLIC BLOOD PRESSURE: 77 MMHG | RESPIRATION RATE: 24 BRPM

## 2023-05-12 DIAGNOSIS — Z90.79 STATUS POST BILATERAL SALPINGECTOMY: Primary | ICD-10-CM

## 2023-05-12 DIAGNOSIS — G89.18 POST-OP PAIN: ICD-10-CM

## 2023-05-12 DIAGNOSIS — Z30.2 ENCOUNTER FOR STERILIZATION: ICD-10-CM

## 2023-05-12 DIAGNOSIS — Z30.2 STERILIZATION: ICD-10-CM

## 2023-05-12 LAB
EXT PREGNANCY TEST URINE: NEGATIVE
EXT. CONTROL: NORMAL

## 2023-05-12 RX ORDER — PROPOFOL 10 MG/ML
INJECTION, EMULSION INTRAVENOUS CONTINUOUS PRN
Status: DISCONTINUED | OUTPATIENT
Start: 2023-05-12 | End: 2023-05-12

## 2023-05-12 RX ORDER — ACETAMINOPHEN 325 MG/1
975 TABLET ORAL EVERY 6 HOURS PRN
Status: DISCONTINUED | OUTPATIENT
Start: 2023-05-12 | End: 2023-05-12 | Stop reason: HOSPADM

## 2023-05-12 RX ORDER — DEXAMETHASONE SODIUM PHOSPHATE 10 MG/ML
INJECTION, SOLUTION INTRAMUSCULAR; INTRAVENOUS AS NEEDED
Status: DISCONTINUED | OUTPATIENT
Start: 2023-05-12 | End: 2023-05-12

## 2023-05-12 RX ORDER — HYDROMORPHONE HCL/PF 1 MG/ML
SYRINGE (ML) INJECTION AS NEEDED
Status: DISCONTINUED | OUTPATIENT
Start: 2023-05-12 | End: 2023-05-12

## 2023-05-12 RX ORDER — SODIUM CHLORIDE, SODIUM LACTATE, POTASSIUM CHLORIDE, CALCIUM CHLORIDE 600; 310; 30; 20 MG/100ML; MG/100ML; MG/100ML; MG/100ML
50 INJECTION, SOLUTION INTRAVENOUS CONTINUOUS
Status: DISCONTINUED | OUTPATIENT
Start: 2023-05-12 | End: 2023-05-12 | Stop reason: HOSPADM

## 2023-05-12 RX ORDER — ONDANSETRON 2 MG/ML
4 INJECTION INTRAMUSCULAR; INTRAVENOUS EVERY 6 HOURS PRN
Status: DISCONTINUED | OUTPATIENT
Start: 2023-05-12 | End: 2023-05-12 | Stop reason: HOSPADM

## 2023-05-12 RX ORDER — ROCURONIUM BROMIDE 10 MG/ML
INJECTION, SOLUTION INTRAVENOUS AS NEEDED
Status: DISCONTINUED | OUTPATIENT
Start: 2023-05-12 | End: 2023-05-12

## 2023-05-12 RX ORDER — OXYCODONE HYDROCHLORIDE 5 MG/1
5 TABLET ORAL EVERY 4 HOURS PRN
Status: DISCONTINUED | OUTPATIENT
Start: 2023-05-12 | End: 2023-05-12 | Stop reason: HOSPADM

## 2023-05-12 RX ORDER — HYDROMORPHONE HCL/PF 1 MG/ML
0.5 SYRINGE (ML) INJECTION
Status: DISCONTINUED | OUTPATIENT
Start: 2023-05-12 | End: 2023-05-12 | Stop reason: HOSPADM

## 2023-05-12 RX ORDER — LIDOCAINE HYDROCHLORIDE 10 MG/ML
INJECTION, SOLUTION EPIDURAL; INFILTRATION; INTRACAUDAL; PERINEURAL AS NEEDED
Status: DISCONTINUED | OUTPATIENT
Start: 2023-05-12 | End: 2023-05-12

## 2023-05-12 RX ORDER — DEXMEDETOMIDINE HYDROCHLORIDE 100 UG/ML
INJECTION, SOLUTION INTRAVENOUS AS NEEDED
Status: DISCONTINUED | OUTPATIENT
Start: 2023-05-12 | End: 2023-05-12

## 2023-05-12 RX ORDER — FENTANYL CITRATE 50 UG/ML
INJECTION, SOLUTION INTRAMUSCULAR; INTRAVENOUS AS NEEDED
Status: DISCONTINUED | OUTPATIENT
Start: 2023-05-12 | End: 2023-05-12

## 2023-05-12 RX ORDER — ONDANSETRON 2 MG/ML
INJECTION INTRAMUSCULAR; INTRAVENOUS AS NEEDED
Status: DISCONTINUED | OUTPATIENT
Start: 2023-05-12 | End: 2023-05-12

## 2023-05-12 RX ORDER — SODIUM CHLORIDE, SODIUM LACTATE, POTASSIUM CHLORIDE, CALCIUM CHLORIDE 600; 310; 30; 20 MG/100ML; MG/100ML; MG/100ML; MG/100ML
INJECTION, SOLUTION INTRAVENOUS CONTINUOUS PRN
Status: DISCONTINUED | OUTPATIENT
Start: 2023-05-12 | End: 2023-05-12

## 2023-05-12 RX ORDER — MAGNESIUM HYDROXIDE 1200 MG/15ML
LIQUID ORAL AS NEEDED
Status: DISCONTINUED | OUTPATIENT
Start: 2023-05-12 | End: 2023-05-12 | Stop reason: HOSPADM

## 2023-05-12 RX ORDER — IBUPROFEN 600 MG/1
600 TABLET ORAL EVERY 6 HOURS PRN
Status: DISCONTINUED | OUTPATIENT
Start: 2023-05-12 | End: 2023-05-12 | Stop reason: HOSPADM

## 2023-05-12 RX ORDER — PROPOFOL 10 MG/ML
INJECTION, EMULSION INTRAVENOUS AS NEEDED
Status: DISCONTINUED | OUTPATIENT
Start: 2023-05-12 | End: 2023-05-12

## 2023-05-12 RX ORDER — BUPIVACAINE HYDROCHLORIDE 2.5 MG/ML
INJECTION, SOLUTION EPIDURAL; INFILTRATION; INTRACAUDAL AS NEEDED
Status: DISCONTINUED | OUTPATIENT
Start: 2023-05-12 | End: 2023-05-12 | Stop reason: HOSPADM

## 2023-05-12 RX ORDER — OXYCODONE HYDROCHLORIDE AND ACETAMINOPHEN 5; 325 MG/1; MG/1
1-2 TABLET ORAL EVERY 6 HOURS PRN
Qty: 8 TABLET | Refills: 0 | Status: SHIPPED | OUTPATIENT
Start: 2023-05-12 | End: 2023-05-22

## 2023-05-12 RX ORDER — MIDAZOLAM HYDROCHLORIDE 2 MG/2ML
INJECTION, SOLUTION INTRAMUSCULAR; INTRAVENOUS AS NEEDED
Status: DISCONTINUED | OUTPATIENT
Start: 2023-05-12 | End: 2023-05-12

## 2023-05-12 RX ORDER — METOCLOPRAMIDE HYDROCHLORIDE 5 MG/ML
10 INJECTION INTRAMUSCULAR; INTRAVENOUS ONCE AS NEEDED
Status: DISCONTINUED | OUTPATIENT
Start: 2023-05-12 | End: 2023-05-12 | Stop reason: HOSPADM

## 2023-05-12 RX ORDER — ONDANSETRON 2 MG/ML
4 INJECTION INTRAMUSCULAR; INTRAVENOUS ONCE AS NEEDED
Status: DISCONTINUED | OUTPATIENT
Start: 2023-05-12 | End: 2023-05-12 | Stop reason: HOSPADM

## 2023-05-12 RX ORDER — IBUPROFEN 600 MG/1
600 TABLET ORAL EVERY 6 HOURS PRN
Qty: 30 TABLET | Refills: 0 | Status: SHIPPED | OUTPATIENT
Start: 2023-05-12

## 2023-05-12 RX ORDER — FENTANYL CITRATE/PF 50 MCG/ML
50 SYRINGE (ML) INJECTION
Status: COMPLETED | OUTPATIENT
Start: 2023-05-12 | End: 2023-05-12

## 2023-05-12 RX ADMIN — ACETAMINOPHEN 975 MG: 325 TABLET ORAL at 11:05

## 2023-05-12 RX ADMIN — SUGAMMADEX 200 MG: 100 INJECTION, SOLUTION INTRAVENOUS at 08:18

## 2023-05-12 RX ADMIN — ONDANSETRON 4 MG: 2 INJECTION INTRAMUSCULAR; INTRAVENOUS at 08:17

## 2023-05-12 RX ADMIN — SODIUM CHLORIDE, SODIUM LACTATE, POTASSIUM CHLORIDE, AND CALCIUM CHLORIDE: .6; .31; .03; .02 INJECTION, SOLUTION INTRAVENOUS at 07:16

## 2023-05-12 RX ADMIN — LIDOCAINE HYDROCHLORIDE 50 MG: 10 INJECTION, SOLUTION EPIDURAL; INFILTRATION; INTRACAUDAL; PERINEURAL at 07:36

## 2023-05-12 RX ADMIN — FENTANYL CITRATE 50 MCG: 50 INJECTION INTRAMUSCULAR; INTRAVENOUS at 09:17

## 2023-05-12 RX ADMIN — PROPOFOL 200 MG: 10 INJECTION, EMULSION INTRAVENOUS at 07:36

## 2023-05-12 RX ADMIN — HYDROMORPHONE HYDROCHLORIDE 0.5 MG: 1 INJECTION, SOLUTION INTRAMUSCULAR; INTRAVENOUS; SUBCUTANEOUS at 07:56

## 2023-05-12 RX ADMIN — DEXAMETHASONE SODIUM PHOSPHATE 10 MG: 10 INJECTION, SOLUTION INTRAMUSCULAR; INTRAVENOUS at 07:36

## 2023-05-12 RX ADMIN — MIDAZOLAM 2 MG: 1 INJECTION INTRAMUSCULAR; INTRAVENOUS at 07:28

## 2023-05-12 RX ADMIN — FENTANYL CITRATE 100 MCG: 50 INJECTION INTRAMUSCULAR; INTRAVENOUS at 07:36

## 2023-05-12 RX ADMIN — PROPOFOL 60 MCG/KG/MIN: 10 INJECTION, EMULSION INTRAVENOUS at 07:42

## 2023-05-12 RX ADMIN — DEXMEDETOMIDINE HYDROCHLORIDE 4 MCG: 100 INJECTION, SOLUTION INTRAVENOUS at 07:54

## 2023-05-12 RX ADMIN — SODIUM CHLORIDE, SODIUM LACTATE, POTASSIUM CHLORIDE, AND CALCIUM CHLORIDE: .6; .31; .03; .02 INJECTION, SOLUTION INTRAVENOUS at 07:30

## 2023-05-12 RX ADMIN — DEXMEDETOMIDINE HYDROCHLORIDE 4 MCG: 100 INJECTION, SOLUTION INTRAVENOUS at 07:49

## 2023-05-12 RX ADMIN — ROCURONIUM BROMIDE 50 MG: 10 INJECTION, SOLUTION INTRAVENOUS at 07:36

## 2023-05-12 RX ADMIN — FENTANYL CITRATE 50 MCG: 50 INJECTION INTRAMUSCULAR; INTRAVENOUS at 09:21

## 2023-05-12 RX ADMIN — DEXMEDETOMIDINE HYDROCHLORIDE 4 MCG: 100 INJECTION, SOLUTION INTRAVENOUS at 07:47

## 2023-05-12 NOTE — INTERVAL H&P NOTE
H&P reviewed  After examining the patient I find no changes in the patients condition since the H&P had been written  Post op instr given  RTO 1 wk  Percocet then Advil prn pain  Has still not had intercourse yet      Vitals:    05/12/23 0559   BP: 135/78   Pulse: 71   Temp: 97 8 °F (36 6 °C)   SpO2: 99%

## 2023-05-12 NOTE — OP NOTE
OPERATIVE REPORT  PATIENT NAME: Yu Kingston    :  1995  MRN: [id number]  Pt Location:  OR ROOM 06    SURGERY DATE: 2023    Surgeon(s) and Role:     * Scottie Michaud MD - Primary     * Idalmis Pearce MD - Assisting    Preop Diagnosis:  Sterilization [Z30 2]    Post-Op Diagnosis Codes:     * Sterilization [Z30 2]    Procedure(s):  Bilateral - LAPAROSCOPIC BILATERAL SALPINGECTOMY    Specimen(s):  ID Type Source Tests Collected by Time Destination   1 :  Tissue Fallopian Tubes, Bilateral TISSUE EXAM Scottie Michaud MD 2023 [id number]        Estimated Blood Loss:   <5cc    Drains:  * No LDAs found *    Anesthesia Type:   General ETT    Operative Indications:  Sterilization [Z30 2]    Operative Findings:  Normal appearing external female genitalia  Normal appearing bilateral Fallopian tubes and ovaries  Minimal adhesive disease  Umbilical trocar site and immediate posterior entry site hemostatic without apparent injury  Luis-Masters window appreciated in posterior cul-de-sac  No apparent endometrial implants  Appendix non-visualized    Complications:   None apparent      Brief History    All risks, benefits, and alternatives to the procedure were discussed with the patient and she had the opportunity to ask questions  The risk of regret of procedure was also addressed with the patient and options for LARC was discussed  Patient expressed desire to continue with tubal sterilization  Informed consent was obtained  Description of Procedure    Patient was taken to the operating room  General endotracheal anesthesia (GET) was administered and the patient was positioned on the OR table in the dorsal lithotomy position  All pressure points were padded and a lior hugger was placed to maintain control of core body temperature  The patient was prepped and draped in the usual sterile fashion with chloroprep on the abdomen and betadine prep on the vagina and perineum      Operative Technique    A 5mm incision was made at the inferior edge of the umbilicus for introduction of a 5mm trocar  Trocar was introduced under direct visualization  Pneumoperitoneum was then established to a maximum of 15mmHg  The entire abdomen and pelvis was inspected and there was no evidence of injury to bowel, bladder, vasculature, or other structures  Appendix was unable to be visualized in the right lower quadrant  Attention was then turned to the pelvis  Patient was placed in Trendelenburg and the uterus was elevated to visualize the fallopian tubes  There was noted to be grossly normal tubes and ovaries bilaterally  Two additional port sites were selected in the left and right lower abdomen approximately 2cm superior and medial to the iliac crests  A 5mm incision was made for introduction of a 5mm trocar under direct visualization at each site  The umbilical port and immediately posterior omentum and bladder was visualized without apparent injury identified  A blunt grasper was inserted through this port and used to visualize the fimbriated ends of the tubes  The left fallopian tube was grasped at its fimbriated end with a blunt grasper and elevated to visualize the mesosalpinx  The Enseal device was used to ligate along the mesosalpinx, working proximally and taking care to avoid ovarian vasculature  Approximately 2cm from the cornua, the Enseal was used to amputate fallopian tube  This was then withdrawn from the abdominal cavity and sent for pathology  Attention was then turned to the contralateral tube, which was amputated in similar fashion  Good hemostasis was confirmed following salpingectomy  Luis-Masters window was appreciated in the left lateral posterior cul-de-sac  No endometrial implants were otherwise identified  Following salpingectomy, pneumoperitoneum was allowed to escape  Adequate hemostasis was visualized  The inferior trocars were removed under direct visualization   The laparoscope was withdrawn from the abdomen, followed by its trocar sleeve at the umbilicus  Skin incisions were closed with 4-0 Monocryl and Exofin  At the conclusion of the procedure, all needle, sponge, and instrument counts were noted to be correct x2  Patient tolerated the procedure well and was transferred to PACU in stable condition prior to discharge with follow up in 1-2 weeks  Dr Alyssa Alexander was present and participated in all key portions of the case             Patient Disposition:  PACU         SIGNATURE: Precious Preciado MD  DATE: May 12, 2023  TIME: 8:40 AM

## 2023-05-12 NOTE — ANESTHESIA POSTPROCEDURE EVALUATION
Post-Op Assessment Note    CV Status:  Stable  Pain Score: 0    Pain management: adequate     Mental Status:  Arousable and sleepy   Hydration Status:  Euvolemic   PONV Controlled:  Controlled   Airway Patency:  Patent      Post Op Vitals Reviewed: Yes      Staff: Anesthesiologist, CRNA         There were no known notable events for this encounter      BP   115/68   Temp 97 5 °F (36 4 °C) (05/12/23 0838)    Pulse   73   Resp   14   SpO2   99% RA

## 2023-05-24 PROBLEM — Z90.79 HISTORY OF BILATERAL SALPINGECTOMY: Status: ACTIVE | Noted: 2023-05-24

## 2023-05-24 NOTE — PROGRESS NOTES
Assessment/Plan:  Normal postoperative visit  Return to the office in 1 to 2 months for a annual visit  Pap smear 10/25       Diagnoses and all orders for this visit:    Postoperative visit    History of bilateral salpingectomy              Subjective:        Patient ID: Chava Ramos is a 28 y o  female  Crystal returns postoperative visit  She has no complaints and is wondering if she can begin exercising again  The following portions of the patient's history were reviewed and updated as appropriate: She  has a past medical history of Anemia and Varicella  Patient Active Problem List    Diagnosis Date Noted   • History of bilateral salpingectomy 2023   • Encounter for sterilization 2023   •  (spontaneous vaginal delivery) 2023   • Sterilization consult 2023   • 40 weeks gestation of pregnancy 2023   • COVID-19 vaccine series declined 2023   • Abnormal glucose affecting pregnancy 2022   • Back pain affecting pregnancy 2022   • Anemia affecting pregnancy in third trimester 10/18/2022   • History of marijuana use 10/17/2022     She  has a past surgical history that includes Induced  and pr laparoscopy w/rmvl adnexal structures (Bilateral, 2023)  Her family history includes Diabetes in her maternal grandmother; Heart disease in her father; No Known Problems in her brother, maternal grandfather, mother, paternal grandfather, paternal grandmother, and sister  She  reports that she has never smoked  She has never used smokeless tobacco  She reports that she does not currently use alcohol  She reports that she does not use drugs    Current Outpatient Medications   Medication Sig Dispense Refill   • acetaminophen (TYLENOL) 325 mg tablet Take 2 tablets (650 mg total) by mouth every 6 (six) hours as needed for headaches, fever or moderate pain 30 tablet 0   • ferrous sulfate 325 (65 Fe) mg tablet Take 325 mg by mouth daily with breakfast     • Prenatal Vit-Fe Fumarate-FA (PRENATAL VITAMIN PO) Take by mouth in the morning     • docusate sodium (COLACE) 100 mg capsule Take 1 capsule (100 mg total) by mouth 2 (two) times a day (Patient not taking: Reported on 5/25/2023)  0   • ibuprofen (MOTRIN) 600 mg tablet Take 1 tablet (600 mg total) by mouth every 6 (six) hours as needed for mild pain (cramping) (Patient not taking: Reported on 5/25/2023) 30 tablet 0   • ibuprofen (MOTRIN) 600 mg tablet Take 1 tablet (600 mg total) by mouth every 6 (six) hours as needed for moderate pain (Patient not taking: Reported on 5/25/2023) 30 tablet 0   • norethindrone (Ortho Micronor) 0 35 MG tablet Take 1 tablet (0 35 mg total) by mouth daily (Patient not taking: Reported on 5/25/2023) 90 tablet 4   • nystatin-triamcinolone (MYCOLOG-II) ointment Apply topically 2 (two) times a day (Patient not taking: Reported on 4/12/2023) 30 g 0     No current facility-administered medications for this visit       Current Outpatient Medications on File Prior to Visit   Medication Sig   • acetaminophen (TYLENOL) 325 mg tablet Take 2 tablets (650 mg total) by mouth every 6 (six) hours as needed for headaches, fever or moderate pain   • ferrous sulfate 325 (65 Fe) mg tablet Take 325 mg by mouth daily with breakfast   • Prenatal Vit-Fe Fumarate-FA (PRENATAL VITAMIN PO) Take by mouth in the morning   • docusate sodium (COLACE) 100 mg capsule Take 1 capsule (100 mg total) by mouth 2 (two) times a day (Patient not taking: Reported on 5/25/2023)   • ibuprofen (MOTRIN) 600 mg tablet Take 1 tablet (600 mg total) by mouth every 6 (six) hours as needed for mild pain (cramping) (Patient not taking: Reported on 5/25/2023)   • ibuprofen (MOTRIN) 600 mg tablet Take 1 tablet (600 mg total) by mouth every 6 (six) hours as needed for moderate pain (Patient not taking: Reported on 5/25/2023)   • norethindrone (Ortho Micronor) 0 35 MG tablet Take 1 tablet (0 35 mg total) by mouth daily (Patient not taking: "Reported on 5/25/2023)   • nystatin-triamcinolone (MYCOLOG-II) ointment Apply topically 2 (two) times a day (Patient not taking: Reported on 4/12/2023)     No current facility-administered medications on file prior to visit  She has No Known Allergies       Review of Systems   Constitutional: Negative  Respiratory: Negative for shortness of breath  Cardiovascular: Negative for chest pain  Gastrointestinal: Negative for abdominal distention  Genitourinary: Negative for difficulty urinating, dysuria, frequency, pelvic pain, vaginal bleeding and vaginal pain  Musculoskeletal: Positive for back pain (This occurred during pregnancy and is not changed at all)  Objective:    Vitals:    05/25/23 0944   BP: 110/76   BP Location: Left arm   Patient Position: Sitting   Cuff Size: Standard   Weight: 90 7 kg (200 lb)   Height: 5' 6\" (1 676 m)            Physical Exam  Vitals and nursing note reviewed  Constitutional:       Appearance: Normal appearance  Abdominal:      General: Abdomen is flat  Palpations: Abdomen is soft  Comments: All 3 incisions healing well   Neurological:      Mental Status: She is alert and oriented to person, place, and time  Psychiatric:         Mood and Affect: Mood normal          Behavior: Behavior normal          Thought Content:  Thought content normal          Judgment: Judgment normal          "

## 2023-05-25 ENCOUNTER — OFFICE VISIT (OUTPATIENT)
Dept: OBGYN CLINIC | Facility: CLINIC | Age: 28
End: 2023-05-25

## 2023-05-25 VITALS
DIASTOLIC BLOOD PRESSURE: 76 MMHG | HEIGHT: 66 IN | BODY MASS INDEX: 32.14 KG/M2 | WEIGHT: 200 LBS | SYSTOLIC BLOOD PRESSURE: 110 MMHG

## 2023-05-25 DIAGNOSIS — Z48.89 POSTOPERATIVE VISIT: Primary | ICD-10-CM

## 2023-05-25 DIAGNOSIS — Z90.79 HISTORY OF BILATERAL SALPINGECTOMY: ICD-10-CM

## 2023-07-12 PROBLEM — Z01.419 ENCOUNTER FOR ANNUAL ROUTINE GYNECOLOGICAL EXAMINATION: Status: ACTIVE | Noted: 2023-07-12

## 2023-07-12 NOTE — PROGRESS NOTES
Assessment/Plan:  NGE  BCM- LBS         Pap smear q 3yrs. '25  Cervical Cultures till 21                                                                            RTO 1 yr. SBA monthly                                                                              Exercise 3/ wk                                                                                        Calcium 1,000 mg/d with Vit D                    Depression Screen: Neg                                     Diagnoses and all orders for this visit:    Encounter for annual routine gynecological examination    History of bilateral salpingectomy              Subjective:        Patient ID: Kacy Cade is a 28 y.o. female. Loyce Cowden presents today for a yearly evaluation. Her last menstrual period was around . She recently had a laparoscopic bilateral salpingectomy. She is currently breast-feeding but weaning her daughter who is 1 months old. Her only complaint is her back pain and shoulder pain. The latter is associated with a heavy feeling as well as pain. Her breasts are large and difficult to support. The following portions of the patient's history were reviewed and updated as appropriate: She  has a past medical history of Anemia and Varicella.   Patient Active Problem List    Diagnosis Date Noted   • Encounter for annual routine gynecological examination 2023   • History of bilateral salpingectomy 2023   • Encounter for sterilization 2023   •  (spontaneous vaginal delivery) 2023   • Sterilization consult 2023   • 40 weeks gestation of pregnancy 2023   • COVID-19 vaccine series declined 2023   • Abnormal glucose affecting pregnancy 2022   • Back pain affecting pregnancy 2022   • Anemia affecting pregnancy in third trimester 10/18/2022   • History of marijuana use 10/17/2022   PMH:  Menarche 13  G4, P3; SAVD ' F 8-15,  F 7-3.7, VIP  Conceived on [employer], 3/6/23 F 40w2d IOL - conceived on OC's  Laparoscopic bilateral salpingectomy  [me]  She  has a past surgical history that includes Induced  and pr laparoscopy w/rmvl adnexal structures (Bilateral, 2023). Her family history includes Diabetes in her maternal grandmother; Heart disease in her father; No Known Problems in her brother, maternal grandfather, mother, paternal grandfather, paternal grandmother, and sister. FH:  F - CAD, MI, CABG  MGM - DM  She  reports that she has never smoked. She has never used smokeless tobacco. She reports that she does not currently use alcohol. She reports that she does not use drugs. SH:  Chastity Juares is her partner since . Current Outpatient Medications   Medication Sig Dispense Refill   • acetaminophen (TYLENOL) 325 mg tablet Take 2 tablets (650 mg total) by mouth every 6 (six) hours as needed for headaches, fever or moderate pain 30 tablet 0   • Prenatal Vit-Fe Fumarate-FA (PRENATAL VITAMIN PO) Take by mouth in the morning       No current facility-administered medications for this visit.      Current Outpatient Medications on File Prior to Visit   Medication Sig   • acetaminophen (TYLENOL) 325 mg tablet Take 2 tablets (650 mg total) by mouth every 6 (six) hours as needed for headaches, fever or moderate pain   • Prenatal Vit-Fe Fumarate-FA (PRENATAL VITAMIN PO) Take by mouth in the morning   • [DISCONTINUED] ferrous sulfate 325 (65 Fe) mg tablet Take 325 mg by mouth daily with breakfast   • [DISCONTINUED] docusate sodium (COLACE) 100 mg capsule Take 1 capsule (100 mg total) by mouth 2 (two) times a day (Patient not taking: Reported on 2023)   • [DISCONTINUED] ibuprofen (MOTRIN) 600 mg tablet Take 1 tablet (600 mg total) by mouth every 6 (six) hours as needed for mild pain (cramping) (Patient not taking: Reported on 2023)   • [DISCONTINUED] ibuprofen (MOTRIN) 600 mg tablet Take 1 tablet (600 mg total) by mouth every 6 (six) hours as needed for moderate pain (Patient not taking: Reported on 5/25/2023)   • [DISCONTINUED] norethindrone (Ortho Micronor) 0.35 MG tablet Take 1 tablet (0.35 mg total) by mouth daily (Patient not taking: Reported on 5/25/2023)   • [DISCONTINUED] nystatin-triamcinolone (MYCOLOG-II) ointment Apply topically 2 (two) times a day (Patient not taking: Reported on 4/12/2023)     No current facility-administered medications on file prior to visit. She has No Known Allergies. .    Review of Systems   Constitutional: Negative for activity change, appetite change, fatigue and unexpected weight change. Eyes: Negative for visual disturbance. Respiratory: Negative for cough, chest tightness, shortness of breath and wheezing. Cardiovascular: Negative for chest pain, palpitations and leg swelling. Breast: Patient denies tenderness, nipple discharge, masses, or erythema. Gastrointestinal: Negative for abdominal distention, abdominal pain, blood in stool, constipation, diarrhea, nausea and vomiting. Endocrine: Negative for cold intolerance and heat intolerance. Genitourinary: Negative for decreased urine volume, difficulty urinating, dyspareunia, dysuria, frequency, hematuria, menstrual problem, pelvic pain, urgency, vaginal bleeding, vaginal discharge and vaginal pain. Musculoskeletal: Positive for arthralgias (Shoulder pain from heavy breasts) and back pain. Skin: Negative for rash. Neurological: Negative for weakness, light-headedness, numbness and headaches. Hematological: Does not bruise/bleed easily. Psychiatric/Behavioral: Negative for agitation, behavioral problems and sleep disturbance. The patient is not nervous/anxious.           Objective:    Vitals:    07/13/23 1110   BP: 120/80   BP Location: Right arm   Patient Position: Sitting   Cuff Size: Standard   Weight: 89.6 kg (197 lb 9.6 oz)   Height: 5' 6" (1.676 m)            Physical Exam  Vitals and nursing note reviewed. Constitutional:       General: She is not in acute distress. Appearance: She is well-developed. HENT:      Head: Normocephalic and atraumatic. Eyes:      General: No scleral icterus. Right eye: No discharge. Left eye: No discharge. Conjunctiva/sclera: Conjunctivae normal.   Neck:      Thyroid: No thyromegaly. Trachea: No tracheal deviation. Cardiovascular:      Rate and Rhythm: Normal rate and regular rhythm. Heart sounds: Normal heart sounds. No murmur heard. Pulmonary:      Effort: Pulmonary effort is normal. No respiratory distress. Breath sounds: Normal breath sounds. No wheezing. Chest:   Breasts:     Breasts are symmetrical.      Right: No inverted nipple, mass, nipple discharge, skin change or tenderness. Left: No inverted nipple, mass, nipple discharge, skin change or tenderness. Abdominal:      General: Bowel sounds are normal. There is no distension. Palpations: Abdomen is soft. There is no mass. Tenderness: There is no abdominal tenderness. There is no guarding or rebound. Genitourinary:     General: Normal vulva. Labia:         Right: No rash, tenderness or lesion. Left: No rash, tenderness or lesion. Vagina: Normal.      Cervix: No cervical motion tenderness or discharge. Uterus: Not deviated, not enlarged and not tender. Adnexa:         Right: No mass, tenderness or fullness. Left: No mass, tenderness or fullness. Rectum: No external hemorrhoid. Comments: Urethral meatus within normal limits. Perineum within normal limits. Bladder well supported. Musculoskeletal:         General: No tenderness. Normal range of motion. Cervical back: Normal range of motion and neck supple. Lymphadenopathy:      Cervical: No cervical adenopathy. Skin:     General: Skin is warm and dry.    Neurological:      Mental Status: She is alert and oriented to person, place, and time. Psychiatric:         Behavior: Behavior normal.         Thought Content:  Thought content normal.         Judgment: Judgment normal.

## 2023-07-13 ENCOUNTER — ANNUAL EXAM (OUTPATIENT)
Dept: OBGYN CLINIC | Facility: CLINIC | Age: 28
End: 2023-07-13
Payer: COMMERCIAL

## 2023-07-13 VITALS
SYSTOLIC BLOOD PRESSURE: 120 MMHG | WEIGHT: 197.6 LBS | BODY MASS INDEX: 31.76 KG/M2 | HEIGHT: 66 IN | DIASTOLIC BLOOD PRESSURE: 80 MMHG

## 2023-07-13 DIAGNOSIS — Z90.79 HISTORY OF BILATERAL SALPINGECTOMY: ICD-10-CM

## 2023-07-13 DIAGNOSIS — Z01.419 ENCOUNTER FOR ANNUAL ROUTINE GYNECOLOGICAL EXAMINATION: Primary | ICD-10-CM

## 2023-07-13 PROCEDURE — 99395 PREV VISIT EST AGE 18-39: CPT | Performed by: OBSTETRICS & GYNECOLOGY

## 2023-07-25 ENCOUNTER — VBI (OUTPATIENT)
Dept: ADMINISTRATIVE | Facility: OTHER | Age: 28
End: 2023-07-25

## 2023-08-22 ENCOUNTER — OFFICE VISIT (OUTPATIENT)
Dept: FAMILY MEDICINE CLINIC | Facility: CLINIC | Age: 28
End: 2023-08-22
Payer: COMMERCIAL

## 2023-08-22 VITALS
SYSTOLIC BLOOD PRESSURE: 120 MMHG | TEMPERATURE: 97.6 F | WEIGHT: 200.2 LBS | BODY MASS INDEX: 32.31 KG/M2 | DIASTOLIC BLOOD PRESSURE: 80 MMHG | OXYGEN SATURATION: 98 % | HEART RATE: 61 BPM

## 2023-08-22 DIAGNOSIS — Z00.00 ANNUAL PHYSICAL EXAM: Primary | ICD-10-CM

## 2023-08-22 DIAGNOSIS — M54.6 ACUTE MIDLINE THORACIC BACK PAIN: ICD-10-CM

## 2023-08-22 DIAGNOSIS — N62 LARGE BREASTS: ICD-10-CM

## 2023-08-22 PROBLEM — Z28.21 COVID-19 VACCINE SERIES DECLINED: Status: RESOLVED | Noted: 2023-01-11 | Resolved: 2023-08-22

## 2023-08-22 PROBLEM — Z3A.40 40 WEEKS GESTATION OF PREGNANCY: Status: RESOLVED | Noted: 2023-01-11 | Resolved: 2023-08-22

## 2023-08-22 PROBLEM — Z28.310 COVID-19 VACCINE SERIES DECLINED: Status: RESOLVED | Noted: 2023-01-11 | Resolved: 2023-08-22

## 2023-08-22 PROBLEM — O99.013 ANEMIA AFFECTING PREGNANCY IN THIRD TRIMESTER: Status: RESOLVED | Noted: 2022-10-18 | Resolved: 2023-08-22

## 2023-08-22 PROBLEM — Z30.09 STERILIZATION CONSULT: Status: RESOLVED | Noted: 2023-01-13 | Resolved: 2023-08-22

## 2023-08-22 PROBLEM — O99.810 ABNORMAL GLUCOSE AFFECTING PREGNANCY: Status: RESOLVED | Noted: 2022-11-19 | Resolved: 2023-08-22

## 2023-08-22 PROBLEM — Z30.2 ENCOUNTER FOR STERILIZATION: Status: RESOLVED | Noted: 2023-05-12 | Resolved: 2023-08-22

## 2023-08-22 PROCEDURE — 99385 PREV VISIT NEW AGE 18-39: CPT

## 2023-08-22 RX ORDER — NAPROXEN 500 MG/1
500 TABLET ORAL 2 TIMES DAILY WITH MEALS
Qty: 60 TABLET | Refills: 0 | Status: SHIPPED | OUTPATIENT
Start: 2023-08-22

## 2023-08-22 RX ORDER — CYCLOBENZAPRINE HCL 5 MG
5 TABLET ORAL 2 TIMES DAILY
Qty: 30 TABLET | Refills: 0 | Status: SHIPPED | OUTPATIENT
Start: 2023-08-22

## 2023-08-22 NOTE — PATIENT INSTRUCTIONS
Upper Back Exercises   AMBULATORY CARE:   Upper back exercises  help heal and strengthen your back muscles and prevent another injury. Ask your healthcare provider if you need to see a physical therapist for more advanced exercises. Seek care immediately if:   You have severe pain that prevents you from moving. Call your doctor if:   Your pain becomes worse. You have new pain. You have questions or concerns about your condition, care, or exercise program.    What you need to know about exercise safety:   Do the exercises on a mat or firm surface (not on a bed). A firm surface will support your spine and prevent upper back pain. Move slowly and smoothly. Avoid fast or jerky motions. Breathe normally. Do not hold your breath. Stop if you feel pain. It is normal to feel some discomfort at first, but you should not feel pain. Regular exercise will help decrease your discomfort over time. Perform upper back exercises safely:  Ask your healthcare provider which of the following exercises are best for you and how often to do them. Head rolls:  Sit in a chair or stand. Bring your chin toward your chest and roll your head to the right. Your ear should be over your shoulder. Hold this position for 5 seconds. Roll your head back toward your chest and to the left. Your ear should be over your left shoulder. Hold this position for 5 seconds. Next, roll your head back slowly in a clockwise [place] and repeat 3 times. Do 3 sets of head rolls. Scapular squeeze:  Sit or stand with your arms at your sides. Squeeze your shoulder blades together and hold for 3 seconds. Relax and repeat 3 times. Pectoralis stretch:   a doorway. Lift your hands and place them on each side of the door frame or wall slightly higher than your head. Lean forward slowly until you feel a gentle stretch. Hold for 15 seconds. Repeat 3 times, or as directed.          Cat and camel exercise:  Place your hands and knees on the floor. Arch your back upward toward the ceiling and lower your head. Round out your spine as much as you can. Hold for 5 seconds. Lift your head upward and push your chest downward toward the floor. Hold for 5 seconds. Do 3 sets or as directed. Bird dog:  Place your hands and knees on the floor. Keep your wrists directly below your shoulders and your knees directly below your hips. Pull your belly button in toward your spine. Do not flatten or arch your back. Tighten your abdominal muscles. Raise one arm straight out so that it is aligned with your head. Next, raise the leg opposite your arm. Hold this position for 15 seconds. Lower your arm and leg slowly and change sides. Do 5 sets. Follow up with your doctor as directed:  Write down your questions so you remember to ask them during your visits. © Copyright [employer] 2022 Information is for End User's use only and may not be sold, redistributed or otherwise used for commercial purposes. The above information is an  only. It is not intended as medical advice for individual conditions or treatments. Talk to your doctor, nurse or pharmacist before following any medical regimen to see if it is safe and effective for you. Wellness Visit for Adults   AMBULATORY CARE:   A wellness visit  is when you see your healthcare provider to get screened for health problems. Your healthcare provider will also give you advice on how to stay healthy. Write down your questions so you remember to ask them. Ask your healthcare provider how often you should have a wellness visit. What happens at a wellness visit:  Your healthcare provider will ask about your health, and your family history of health problems. This includes high blood pressure, heart disease, and cancer. He or she will ask if you have symptoms that concern you, if you smoke, and about your mood.  You may also be asked about your intake of medicines, supplements, food, and alcohol. Any of the following may be done: Your weight  will be checked. Your height may also be checked so your body mass index (BMI) can be calculated. Your BMI shows if you are at a healthy weight. Your blood pressure  and heart rate will be checked. Your temperature may also be checked. Blood and urine tests  may be done. Blood tests may be done to check your cholesterol levels. Abnormal cholesterol levels increase your risk for heart disease and stroke. You may also need a blood or urine test to check for diabetes if you are at increased risk. Urine tests may be done to look for signs of an infection or kidney disease. A physical exam  includes checking your heartbeat and lungs with a stethoscope. Your healthcare provider may also check your skin to look for sun damage. Screening tests  may be recommended. A screening test is done to check for diseases that may not cause symptoms. The screening tests you may need depend on your age, gender, family history, and lifestyle habits. For example, colorectal screening may be recommended if you are 48years old or older. Screening tests you need if you are a woman:   A Pap smear  is used to screen for cervical cancer. Pap smears are usually done every 3 to 5 years depending on your age. You may need them more often if you have had abnormal Pap smear test results in the past. Ask your healthcare provider how often you should have a Pap smear. A mammogram  is an x-ray of your breasts to screen for breast cancer. Experts recommend mammograms every 2 years starting at age 48 years. You may need a mammogram at age 52 years or younger if you have an increased risk for breast cancer. Talk to your healthcare provider about when you should start having mammograms and how often you need them. Vaccines you may need:   Get an influenza vaccine  every year. The influenza vaccine protects you from the flu. Several types of viruses cause the flu.  The viruses change over time, so new vaccines are made each year. Get a tetanus-diphtheria (Td) booster vaccine  every 10 years. This vaccine protects you against tetanus and diphtheria. Tetanus is a severe infection that may cause painful muscle spasms and lockjaw. Diphtheria is a severe bacterial infection that causes a thick covering in the back of your mouth and throat. Get a human papillomavirus (HPV) vaccine  if you are female and aged 23 to 32 or male 23 to 24 and never received it. This vaccine protects you from HPV infection. HPV is the most common infection spread by sexual contact. HPV may also cause vaginal, penile, and anal cancers. Get a pneumococcal vaccine  if you are aged 72 years or older. The pneumococcal vaccine is an injection given to protect you from pneumococcal disease. Pneumococcal disease is an infection caused by pneumococcal bacteria. The infection may cause pneumonia, meningitis, or an ear infection. Get a shingles vaccine  if you are 60 or older, even if you have had shingles before. The shingles vaccine is an injection to protect you from the varicella-zoster virus. This is the same virus that causes chickenpox. Shingles is a painful rash that develops in people who had chickenpox or have been exposed to the virus. How to eat healthy:  My Plate is a model for planning healthy meals. It shows the types and amounts of foods that should go on your plate. Fruits and vegetables make up about half of your plate, and grains and protein make up the other half. A serving of dairy is included on the side of your plate. The amount of calories and serving sizes you need depends on your age, gender, weight, and height. Examples of healthy foods are listed below:  Eat a variety of vegetables  such as dark green, red, and orange vegetables. You can also include canned vegetables low in sodium (salt) and frozen vegetables without added butter or sauces.     Eat a variety of fresh fruits , canned fruit in 100% juice, frozen fruit, and dried fruit. Include whole grains. At least half of the grains you eat should be whole grains. Examples include whole-wheat bread, wheat pasta, brown rice, and whole-grain cereals such as oatmeal.    Eat a variety of protein foods such as seafood (fish and shellfish), lean meat, and poultry without skin (turkey and chicken). Examples of lean meats include pork leg, shoulder, or tenderloin, and beef round, sirloin, tenderloin, and extra lean ground beef. Other protein foods include eggs and egg substitutes, beans, peas, soy products, nuts, and seeds. Choose low-fat dairy products such as skim or 1% milk or low-fat yogurt, cheese, and cottage cheese. Limit unhealthy fats  such as butter, hard margarine, and shortening. Exercise:  Exercise at least 30 minutes per day on most days of the week. Some examples of exercise include walking, biking, dancing, and swimming. You can also fit in more physical activity by taking the stairs instead of the elevator or parking farther away from stores. Include muscle strengthening activities 2 days each week. Regular exercise provides many health benefits. It helps you manage your weight, and decreases your risk for type 2 diabetes, heart disease, stroke, and high blood pressure. Exercise can also help improve your mood. Ask your healthcare provider about the best exercise plan for you. General health and safety guidelines:   Do not smoke. Nicotine and other chemicals in cigarettes and cigars can cause lung damage. Ask your healthcare provider for information if you currently smoke and need help to quit. E-cigarettes or smokeless tobacco still contain nicotine. Talk to your healthcare provider before you use these products. Limit alcohol. A drink of alcohol is 12 ounces of beer, 5 ounces of wine, or 1½ ounces of liquor. Lose weight, if needed. Being overweight increases your risk of certain health conditions.  These include heart disease, high blood pressure, type 2 diabetes, and certain types of cancer. Protect your skin. Do not sunbathe or use tanning beds. Use sunscreen with a SPF 15 or higher. Apply sunscreen at least 15 minutes before you go outside. Reapply sunscreen every 2 hours. Wear protective clothing, hats, and sunglasses when you are outside. Drive safely. Always wear your seatbelt. Make sure everyone in your car wears a seatbelt. A seatbelt can save your life if you are in an accident. Do not use your cell phone when you are driving. This could distract you and cause an accident. Pull over if you need to make a call or send a text message. Practice safe sex. Use latex condoms if are sexually active and have more than one partner. Your healthcare provider may recommend screening tests for sexually transmitted infections (STIs). Wear helmets, lifejackets, and protective gear. Always wear a helmet when you ride a bike or motorcycle, go skiing, or play sports that could cause a head injury. Wear protective equipment when you play sports. Wear a lifejacket when you are on a boat or doing water sports. © Copyright [facility] 2022 Information is for End User's use only and may not be sold, redistributed or otherwise used for commercial purposes. The above information is an  only. It is not intended as medical advice for individual conditions or treatments. Talk to your doctor, nurse or pharmacist before following any medical regimen to see if it is safe and effective for you.

## 2023-08-22 NOTE — PROGRESS NOTES
[address] [place] [facility]    NAME: Jamil Becerra  AGE: 33 y.o. SEX: female  : 1995     DATE: 2023     Assessment and Plan:     Problem List Items Addressed This Visit        Other    Acute midline thoracic back pain     Midline thoracic back pain related to large breasts   Refer to physical therapy         Relevant Medications    naproxen (Naprosyn) 500 mg tablet    cyclobenzaprine (FLEXERIL) 5 mg tablet    Other Relevant Orders    Ambulatory Referral to Physical Therapy    Large breasts     Large breast (40G) affecting every day life   Has chronic back pain in the thoracic back  Indentations on shoulder from bras   Has had rashes in the past underneath breasts   Referral to physical therapy   If pain is still persistent after PT consider plastic surgery referral        Other Visit Diagnoses     Annual physical exam    -  Primary          Immunizations and preventive care screenings were discussed with patient today. Appropriate education was printed on patient's after visit summary. Counseling:  Alcohol/drug use: discussed moderation in alcohol intake, the recommendations for healthy alcohol use, and avoidance of illicit drug use. Dental Health: discussed importance of regular tooth brushing, flossing, and dental visits. Injury prevention: discussed safety/seat belts, safety helmets, smoke detectors, carbon dioxide detectors, and smoking near bedding or upholstery. Sexual health: discussed sexually transmitted diseases, partner selection, use of condoms, avoidance of unintended pregnancy, and contraceptive alternatives. · Exercise: the importance of regular exercise/physical activity was discussed. Recommend exercise 3-5 times per week for at least 30 minutes. BMI Counseling: Body mass index is 32.31 kg/m².  The BMI is above normal. Nutrition recommendations include decreasing portion sizes, encouraging healthy choices of fruits and vegetables, decreasing fast food intake, consuming healthier snacks, limiting drinks that contain sugar, moderation in carbohydrate intake and reducing intake of saturated and trans fat. Exercise recommendations include exercising 3-5 times per week. No pharmacotherapy was ordered. Rationale for BMI follow-up plan is due to patient being overweight or obese. Depression Screening and Follow-up Plan: Patient was screened for depression during today's encounter. They screened negative with a PHQ-2 score of 2. Return in 1 year (on 8/22/2024). Chief Complaint:     Chief Complaint   Patient presents with   • Physical Exam     Back and shoulder pain. Had a baby 5 months ago, still experiencing pain. History of Present Illness:     Adult Annual Physical   Patient here for a comprehensive physical exam. The patient reports problems - back pain . Diet and Physical Activity  · Diet/Nutrition: well balanced diet. · Exercise: moderate cardiovascular exercise. Depression Screening  PHQ-2/9 Depression Screening    Little interest or pleasure in doing things: 1 - several days  Feeling down, depressed, or hopeless: 1 - several days  PHQ-2 Score: 2  PHQ-2 Interpretation: Negative depression screen       General Health  · Sleep: sleeps well. · Hearing: normal - none . · Vision: no vision problems. · Dental: no dental visits for >1 year. /GYN Health  · Last menstrual period: Bilateral Salpingectomy  · History of STDs?: no.     Review of Systems:     Review of Systems   Constitutional: Negative for activity change, chills, fatigue and fever. HENT: Negative for congestion, ear pain, rhinorrhea, sore throat and trouble swallowing. Eyes: Negative for pain and visual disturbance. Respiratory: Negative for cough, chest tightness and shortness of breath. Cardiovascular: Negative for chest pain, palpitations and leg swelling.    Gastrointestinal: Negative for abdominal pain, constipation, diarrhea, nausea and vomiting. Genitourinary: Negative for difficulty urinating, dysuria, hematuria and urgency. Musculoskeletal: Positive for back pain. Negative for arthralgias. Skin: Negative for color change and rash. Neurological: Negative for dizziness, seizures, syncope and headaches. Psychiatric/Behavioral: Negative for dysphoric mood. The patient is not nervous/anxious. All other systems reviewed and are negative. Past Medical History:     Past Medical History:   Diagnosis Date   • 40 weeks gestation of pregnancy 2023   • Abnormal glucose affecting pregnancy 2022    Normal repeat 3 hr.    • Anemia    • Anemia affecting pregnancy in third trimester 10/18/2022    11/18/22- hgb9. 0/Hct28.9 IV iron transfusions ordered   • COVID-19 vaccine series declined 2023   • Sterilization consult 2023   •  (spontaneous vaginal delivery) 3/6/2023   • Varicella     had vaccines      Past Surgical History:     Past Surgical History:   Procedure Laterality Date   • INDUCED       [facility]   • [place] LAPAROSCOPY W/RMVL ADNEXAL STRUCTURES Bilateral 2023    Procedure: LAPAROSCOPIC BILATERAL SALPINGECTOMY;  Surgeon: Chucho Chun MD;  Location: [facility];  Service: Gynecology      Social History:     Social History     Socioeconomic History   • Marital status: /Civil Union     Spouse name: None   • Number of children: None   • Years of education: None   • Highest education level: None   Occupational History   • None   Tobacco Use   • Smoking status: Never   • Smokeless tobacco: Never   Vaping Use   • Vaping Use: Former   Substance and Sexual Activity   • Alcohol use: Not Currently   • Drug use: Yes     Types: Marijuana     Comment: ocassionally to help with anxiety   • Sexual activity: Yes     Partners: Male     Birth control/protection: None, Female Sterilization   Other Topics Concern   • None   Social History Narrative   • None     Social Determinants of Health     Financial Resource Strain: Not on file   Food Insecurity: Not on file   Transportation Needs: Not on file   Physical Activity: Not on file   Stress: Not on file   Social Connections: Not on file   Intimate Partner Violence: Not on file   Housing Stability: Not on file      Family History:     Family History   Problem Relation Age of Onset   • No Known Problems Mother    • Heart disease Father    • No Known Problems Sister    • No Known Problems Brother    • Diabetes Maternal Grandmother    • No Known Problems Maternal Grandfather    • No Known Problems Paternal Grandmother    • No Known Problems Paternal Grandfather    • Breast cancer Neg Hx    • Colon cancer Neg Hx    • Ovarian cancer Neg Hx       Current Medications:     Current Outpatient Medications   Medication Sig Dispense Refill   • acetaminophen (TYLENOL) 325 mg tablet Take 2 tablets (650 mg total) by mouth every 6 (six) hours as needed for headaches, fever or moderate pain 30 tablet 0   • cyclobenzaprine (FLEXERIL) 5 mg tablet Take 1 tablet (5 mg total) by mouth 2 (two) times a day 30 tablet 0   • naproxen (Naprosyn) 500 mg tablet Take 1 tablet (500 mg total) by mouth 2 (two) times a day with meals 60 tablet 0   • Prenatal Vit-Fe Fumarate-FA (PRENATAL VITAMIN PO) Take by mouth in the morning       No current facility-administered medications for this visit. Allergies:     No Known Allergies   Physical Exam:     /80 (BP Location: Left arm, Patient Position: Sitting, Cuff Size: Large)   Pulse 61   Temp 97.6 °F (36.4 °C) (Temporal)   Wt 90.8 kg (200 lb 3.2 oz)   LMP  (LMP Unknown)   SpO2 98%   BMI 32.31 kg/m²     Physical Exam  Vitals and nursing note reviewed. Constitutional:       General: She is not in acute distress. Appearance: Normal appearance. She is well-developed. HENT:      Head: Normocephalic and atraumatic.       Right Ear: Tympanic membrane, ear canal and external ear normal. There is no impacted cerumen. Left Ear: Tympanic membrane, ear canal and external ear normal. There is no impacted cerumen. Nose: Nose normal.      Mouth/Throat:      Mouth: Mucous membranes are moist.      Pharynx: Oropharynx is clear. Eyes:      Extraocular Movements: Extraocular movements intact. Conjunctiva/sclera: Conjunctivae normal.      Pupils: Pupils are equal, round, and reactive to light. Cardiovascular:      Rate and Rhythm: Normal rate and regular rhythm. Pulses: Normal pulses. Heart sounds: Normal heart sounds. No murmur heard. Pulmonary:      Effort: Pulmonary effort is normal. No respiratory distress. Breath sounds: Normal breath sounds. Abdominal:      General: Bowel sounds are normal.      Palpations: Abdomen is soft. Tenderness: There is no abdominal tenderness. Musculoskeletal:         General: Normal range of motion. Cervical back: Neck supple. Right lower leg: No edema. Left lower leg: No edema. Skin:     General: Skin is warm and dry. Capillary Refill: Capillary refill takes less than 2 seconds. Neurological:      General: No focal deficit present. Mental Status: She is alert and oriented to person, place, and time. Mental status is at baseline. Psychiatric:         Mood and Affect: Mood normal.         Behavior: Behavior normal.         Thought Content: Thought content normal.         Judgment: Judgment normal.        Patient Instructions     Upper Back Exercises   AMBULATORY CARE:   Upper back exercises  help heal and strengthen your back muscles and prevent another injury. Ask your healthcare provider if you need to see a physical therapist for more advanced exercises. Seek care immediately if:   • You have severe pain that prevents you from moving. Call your doctor if:   • Your pain becomes worse. • You have new pain.     • You have questions or concerns about your condition, care, or exercise program.    What you need to know about exercise safety:   • Do the exercises on a mat or firm surface (not on a bed). A firm surface will support your spine and prevent upper back pain. • Move slowly and smoothly. Avoid fast or jerky motions. • Breathe normally. Do not hold your breath. • Stop if you feel pain. It is normal to feel some discomfort at first, but you should not feel pain. Regular exercise will help decrease your discomfort over time. Perform upper back exercises safely:  Ask your healthcare provider which of the following exercises are best for you and how often to do them. • Head rolls:  Sit in a chair or stand. Bring your chin toward your chest and roll your head to the right. Your ear should be over your shoulder. Hold this position for 5 seconds. Roll your head back toward your chest and to the left. Your ear should be over your left shoulder. Hold this position for 5 seconds. Next, roll your head back slowly in a clockwise Tribal and repeat 3 times. Do 3 sets of head rolls. • Scapular squeeze:  Sit or stand with your arms at your sides. Squeeze your shoulder blades together and hold for 3 seconds. Relax and repeat 3 times. • Pectoralis stretch:   a doorway. Lift your hands and place them on each side of the door frame or wall slightly higher than your head. Lean forward slowly until you feel a gentle stretch. Hold for 15 seconds. Repeat 3 times, or as directed. • Cat and camel exercise:  Place your hands and knees on the floor. Arch your back upward toward the ceiling and lower your head. Round out your spine as much as you can. Hold for 5 seconds. Lift your head upward and push your chest downward toward the floor. Hold for 5 seconds. Do 3 sets or as directed. • Bird dog:  Place your hands and knees on the floor. Keep your wrists directly below your shoulders and your knees directly below your hips.  Pull your belly button in toward your spine. Do not flatten or arch your back. Tighten your abdominal muscles. Raise one arm straight out so that it is aligned with your head. Next, raise the leg opposite your arm. Hold this position for 15 seconds. Lower your arm and leg slowly and change sides. Do 5 sets. Follow up with your doctor as directed:  Write down your questions so you remember to ask them during your visits. © Copyright [employer] 2022 Information is for End User's use only and may not be sold, redistributed or otherwise used for commercial purposes. The above information is an  only. It is not intended as medical advice for individual conditions or treatments. Talk to your doctor, nurse or pharmacist before following any medical regimen to see if it is safe and effective for you. Wellness Visit for Adults   AMBULATORY CARE:   A wellness visit  is when you see your healthcare provider to get screened for health problems. Your healthcare provider will also give you advice on how to stay healthy. Write down your questions so you remember to ask them. Ask your healthcare provider how often you should have a wellness visit. What happens at a wellness visit:  Your healthcare provider will ask about your health, and your family history of health problems. This includes high blood pressure, heart disease, and cancer. He or she will ask if you have symptoms that concern you, if you smoke, and about your mood. You may also be asked about your intake of medicines, supplements, food, and alcohol. Any of the following may be done:  • Your weight  will be checked. Your height may also be checked so your body mass index (BMI) can be calculated. Your BMI shows if you are at a healthy weight. • Your blood pressure  and heart rate will be checked. Your temperature may also be checked. • Blood and urine tests  may be done. Blood tests may be done to check your cholesterol levels.  Abnormal cholesterol levels increase your risk for heart disease and stroke. You may also need a blood or urine test to check for diabetes if you are at increased risk. Urine tests may be done to look for signs of an infection or kidney disease. • A physical exam  includes checking your heartbeat and lungs with a stethoscope. Your healthcare provider may also check your skin to look for sun damage. • Screening tests  may be recommended. A screening test is done to check for diseases that may not cause symptoms. The screening tests you may need depend on your age, gender, family history, and lifestyle habits. For example, colorectal screening may be recommended if you are 48years old or older. Screening tests you need if you are a woman:   • A Pap smear  is used to screen for cervical cancer. Pap smears are usually done every 3 to 5 years depending on your age. You may need them more often if you have had abnormal Pap smear test results in the past. Ask your healthcare provider how often you should have a Pap smear. • A mammogram  is an x-ray of your breasts to screen for breast cancer. Experts recommend mammograms every 2 years starting at age 48 years. You may need a mammogram at age 52 years or younger if you have an increased risk for breast cancer. Talk to your healthcare provider about when you should start having mammograms and how often you need them. Vaccines you may need:   • Get an influenza vaccine  every year. The influenza vaccine protects you from the flu. Several types of viruses cause the flu. The viruses change over time, so new vaccines are made each year. • Get a tetanus-diphtheria (Td) booster vaccine  every 10 years. This vaccine protects you against tetanus and diphtheria. Tetanus is a severe infection that may cause painful muscle spasms and lockjaw. Diphtheria is a severe bacterial infection that causes a thick covering in the back of your mouth and throat.     • Get a human papillomavirus (HPV) vaccine  if you are female and aged 23 to 32 or male 23 to 24 and never received it. This vaccine protects you from HPV infection. HPV is the most common infection spread by sexual contact. HPV may also cause vaginal, penile, and anal cancers. • Get a pneumococcal vaccine  if you are aged 72 years or older. The pneumococcal vaccine is an injection given to protect you from pneumococcal disease. Pneumococcal disease is an infection caused by pneumococcal bacteria. The infection may cause pneumonia, meningitis, or an ear infection. • Get a shingles vaccine  if you are 60 or older, even if you have had shingles before. The shingles vaccine is an injection to protect you from the varicella-zoster virus. This is the same virus that causes chickenpox. Shingles is a painful rash that develops in people who had chickenpox or have been exposed to the virus. How to eat healthy:  My Plate is a model for planning healthy meals. It shows the types and amounts of foods that should go on your plate. Fruits and vegetables make up about half of your plate, and grains and protein make up the other half. A serving of dairy is included on the side of your plate. The amount of calories and serving sizes you need depends on your age, gender, weight, and height. Examples of healthy foods are listed below:  • Eat a variety of vegetables  such as dark green, red, and orange vegetables. You can also include canned vegetables low in sodium (salt) and frozen vegetables without added butter or sauces. • Eat a variety of fresh fruits , canned fruit in 100% juice, frozen fruit, and dried fruit. • Include whole grains. At least half of the grains you eat should be whole grains. Examples include whole-wheat bread, wheat pasta, brown rice, and whole-grain cereals such as oatmeal.    • Eat a variety of protein foods such as seafood (fish and shellfish), lean meat, and poultry without skin (turkey and chicken).  Examples of lean meats include pork leg, shoulder, or tenderloin, and beef round, sirloin, tenderloin, and extra lean ground beef. Other protein foods include eggs and egg substitutes, beans, peas, soy products, nuts, and seeds. • Choose low-fat dairy products such as skim or 1% milk or low-fat yogurt, cheese, and cottage cheese. • Limit unhealthy fats  such as butter, hard margarine, and shortening. Exercise:  Exercise at least 30 minutes per day on most days of the week. Some examples of exercise include walking, biking, dancing, and swimming. You can also fit in more physical activity by taking the stairs instead of the elevator or parking farther away from stores. Include muscle strengthening activities 2 days each week. Regular exercise provides many health benefits. It helps you manage your weight, and decreases your risk for type 2 diabetes, heart disease, stroke, and high blood pressure. Exercise can also help improve your mood. Ask your healthcare provider about the best exercise plan for you. General health and safety guidelines:   • Do not smoke. Nicotine and other chemicals in cigarettes and cigars can cause lung damage. Ask your healthcare provider for information if you currently smoke and need help to quit. E-cigarettes or smokeless tobacco still contain nicotine. Talk to your healthcare provider before you use these products. • Limit alcohol. A drink of alcohol is 12 ounces of beer, 5 ounces of wine, or 1½ ounces of liquor. • Lose weight, if needed. Being overweight increases your risk of certain health conditions. These include heart disease, high blood pressure, type 2 diabetes, and certain types of cancer. • Protect your skin. Do not sunbathe or use tanning beds. Use sunscreen with a SPF 15 or higher. Apply sunscreen at least 15 minutes before you go outside. Reapply sunscreen every 2 hours. Wear protective clothing, hats, and sunglasses when you are outside. • Drive safely. Always wear your seatbelt. Make sure everyone in your car wears a seatbelt. A seatbelt can save your life if you are in an accident. Do not use your cell phone when you are driving. This could distract you and cause an accident. Pull over if you need to make a call or send a text message. • Practice safe sex. Use latex condoms if are sexually active and have more than one partner. Your healthcare provider may recommend screening tests for sexually transmitted infections (STIs). • Wear helmets, lifejackets, and protective gear. Always wear a helmet when you ride a bike or motorcycle, go skiing, or play sports that could cause a head injury. Wear protective equipment when you play sports. Wear a lifejacket when you are on a boat or doing water sports. © Copyright [facility] 2022 Information is for End User's use only and may not be sold, redistributed or otherwise used for commercial purposes. The above information is an  only. It is not intended as medical advice for individual conditions or treatments. Talk to your doctor, nurse or pharmacist before following any medical regimen to see if it is safe and effective for you.         Verner Law, ROSA   [address]

## 2023-08-22 NOTE — ASSESSMENT & PLAN NOTE
Large breast (40G) affecting every day life   Has chronic back pain in the thoracic back  Indentations on shoulder from bras   Has had rashes in the past underneath breasts   Referral to physical therapy   If pain is still persistent after PT consider plastic surgery referral

## 2023-08-29 NOTE — PROGRESS NOTES
PT Evaluation     Today's date: 2023  Patient name: Yuval Yañez  : 1995  MRN: [id number]  Referring provider: ROSA Nichols  Dx:   Encounter Diagnosis     ICD-10-CM    1. Acute midline thoracic back pain  M54.6           Start Time: 1400  Stop Time: 1500  Total time in clinic (min): 60 minutes    Assessment  Assessment details: Yuval Yañez is a pleasant 33year old female with a referred dx of midline thoracic pain secondarily Macromastia from her PCP Anita Bain, [address] [place] [address]. No further referral appears necessary at this time based upon examination results. 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 Upon further clinical testing, patient presents with the following deficits: restricted thoracic extension, decrease diaphragmatic breathing coordination, thoracic joint irritability, pain with PAVIMS, poor periscapular and mid thoracic strength, and poor postural endurance. These deficits and impairments result in pain and difficulty performing ADLs, self-care, sleep quality, and lifting/bending which are required for her optimally take care of her family as she is a single mom. Pt was provided with a basic HEP focused on thoracic extension and trapezius strengthening which will be reviewed in the upcoming session. Pt able to demonstrate HEP with good technique and no pain. Educated pt to stop any exercises causing pain increase, pt verbalizes understanding. Pt was educated on anatomy and physiology of diagnosis and demonstrated verbal understanding. Positive prognostic indicators include positive attitude toward recovery and good understanding of diagnosis and treatment plan options. Negative prognostic indicators include chronicity of symptoms and high symptom irritability.  Pt would benefit from skilled OP physical therapy to address these, and the below impairments in order to optimize outcomes and promote return to functional baseline. Problem List:  1) Poor Postural Endurance/Strength  2) Restricted Thoracic Extension    Comparable signs:  1) Prolonged Sitting/Standing  2) Bending/Lifting      Patient verbalized understanding of POC. Please contact me if you have any questions or recommendations. Thank you for the referral and the opportunity to share in Rosa Maria's care. Impairments: abnormal muscle firing, abnormal muscle tone, abnormal or restricted ROM, abnormal movement, activity intolerance, impaired physical strength, lacks appropriate home exercise program, pain with function, scapular dyskinesis, weight-bearing intolerance, poor posture  and poor body mechanics  Functional limitations: performing ADLS, prolonged sitting/standing  Symptom irritability: highBarriers to therapy: Chronicity of Symptoms, Macromastia   Understanding of Dx/Px/POC: good   Prognosis: good    Goals    Short Term Goals: In 4-6 weeks, the patient will:  1. Pt will report at least a 25% reduction in subjective pain complaints/symptoms to better manage ADLs and household chores. 2. Pt will be able to tolerate atleast 10 mins of prolonged positions  3. Pt independent with initial HEP, rationale, technique and frequency, for ROM and pain control. 4. Pt will have atleast 10% improvement in her thoracic extension      Long Term Goals: In 6 weeks, the patient will:  1. Pt will be able to tolerate atleast 20-30 minutes of prolonged positions  2. FOTO to greater than predicted value. 3. Independent with comprehensive HEP upon discharge. 4. Pt will be able to perform ADLs, iADLS, and household duties with minimal restriction indicating return to PLOF. 5. Pt independent with rationale, technique and plan for performance of advanced HEP to ensure independent self-management of symptoms upon discharge. 6. Pt will have WNL thoracic extension and trapezius strength.        Plan  Patient would benefit from: PT eval and skilled physical therapy  Referral necessary: No  Planned therapy interventions: abdominal trunk stabilization, activity modification, body mechanics training, breathing training, motor coordination training, neuromuscular re-education, patient education, postural training, graded activity, graded exercise, graded motor, home exercise program, work reintegration, therapeutic training, therapeutic exercise, strengthening, therapeutic activities, self care and behavior modification  Frequency: 1x week  Plan of Care beginning date: 2023  Plan of Care expiration date: 10/25/2023  Treatment plan discussed with: patient        Subjective Evaluation    History of Present Illness  Mechanism of injury: Patient presents to physical therapy with complaints of chronic midline thoracic pain secondarily to poor posture from macromastia. Patient wishes to trial 6 weeks of PT before considering a breast reduction. Patient's pain causes her to have difficulty with various sleeping positions, performing ADLS, lifting, bending over, and reaching OH. Relieving factors at this time include heat and changing of bra. Patient also notes breathing has changed. She notes she feels best when she stretches upper back and worse when she tries to perform upright posture.            Recurrent probem    Quality of life: good    Patient Goals  Patient goals for therapy: decreased pain, increased motion, return to sport/leisure activities, independence with ADLs/IADLs, return to work and increased strength  Patient goal: prolonged 20-30 mins position, 'get back to normal self," exercise   Pain  Current pain ratin  At best pain ratin  At worst pain rating: 10  Location: Mid-Thoracic and Upper Trap   Quality: pressure, dull ache, discomfort and radiating  Relieving factors: change in position, heat, medications, rest and support  Aggravating factors: sitting, walking, standing, overhead activity, lifting and running (prolonged positions 20-30 mins)    Social Support  Lives with: young children    Employment status: not working  Exercise history: None at this time  Life stress: Mod-High      Diagnostic Tests  No diagnostic tests performed  Treatments  No previous or current treatments  Previous treatment: medication  Current treatment: physical therapy        Objective      Standing         Head Position x Protracted  Neutral  Retracted   Scapular Position x Protracted  Neutral  Retracted   Thoracic Spine x Inc Kyphosis  Neutral       Strength and ROM evaluated B from a regional biomechanical perspective and values relevant to this episode recorded in table below    Thoracic ROM:  Flexion: WNL relieving  Extension: Very Limited Painful  Rotation: WNL  Lateral Flexion: L P! Active ROM: Goniometric measurement revealed the following findings. Shoulder ROM Right Left   Flexion WNL WNL   Abduction WNL WNL   BTH WNL WNL   BTB WNL WNL       Strength: MMT revealed the following findings. Joint Motion Right Left   Sh. Flexion 5/5 5/5   Sh. Elevation 4+/5 4+/5   Sh. Abduction 4+/5 4+/5   Sh. ER 5/5 5/5   Sh. IR 5/5 5/5   Shoulder extension 4-/5 4-/5   Mid-Trap 5/5 5/5   Lower Trap  4-/5 4-/5       Additional Assessments:  Palpation: TTP  Joint mobility: Hypomobile in Thoracic Region    C/s Screen:   ROM: WNL    Neuro Screen:   Dermatomes: Intact  Myotomes: Intact  Reflexes:    Biceps: 2   Brachioradialis: 2   Triceps: 2      Pertinent Findings:                                                                                                                                                     Test / Measure  8/30/2023   FOTO 38   Mid-Lower Trap 4-/5   Thoracic Extension Restricted and P! Precautions: none    Access Code: [email or web address]  URL: [email or web address]  Date: 08/30/2023  Prepared by: Jean-Pierre Brown    Exercises  - Prone Chest Stretch on Chair  - 1 x daily - 7 x weekly - 3-4 sets - 10 reps - 20-30" hold  - Thoracic Extension Mobilization on Foam Roll - 1 x daily - 7 x weekly - 2 sets - 10 reps  - Prone Single Arm Shoulder Horizontal Abduction with Dumbbell  - 1 x daily - 7 x weekly - 2-3 sets - 10 reps - 1-2" hold  - Prone Single Arm Shoulder Y with Dumbbell  - 1 x daily - 7 x weekly - 2-3 sets - 10 reps - 1-2" hold    Manuals 8/30            Thoracic PA AD            Thoracic Grade 5 Mobilization AD                                      Neuro Re-Ed             Towel Slides with Resisted Abduction             Paloff Press             D2 Flexion/Extension UE                                                                  Ther Ex             UBE             Seated Repeated Thoracic Extension             Prone I-Y-Ts on Stability Ball                                                                              Ther Activity                                       Gait Training                                       Modalities

## 2023-08-30 ENCOUNTER — EVALUATION (OUTPATIENT)
Dept: PHYSICAL THERAPY | Facility: REHABILITATION | Age: 28
End: 2023-08-30
Payer: COMMERCIAL

## 2023-08-30 DIAGNOSIS — M54.6 ACUTE MIDLINE THORACIC BACK PAIN: Primary | ICD-10-CM

## 2023-08-30 PROCEDURE — 97110 THERAPEUTIC EXERCISES: CPT

## 2023-08-30 PROCEDURE — 97140 MANUAL THERAPY 1/> REGIONS: CPT

## 2023-08-30 PROCEDURE — 97161 PT EVAL LOW COMPLEX 20 MIN: CPT

## 2023-09-07 ENCOUNTER — OFFICE VISIT (OUTPATIENT)
Dept: PHYSICAL THERAPY | Facility: REHABILITATION | Age: 28
End: 2023-09-07
Payer: COMMERCIAL

## 2023-09-07 DIAGNOSIS — M54.6 ACUTE MIDLINE THORACIC BACK PAIN: ICD-10-CM

## 2023-09-07 PROCEDURE — 97110 THERAPEUTIC EXERCISES: CPT

## 2023-09-07 NOTE — PROGRESS NOTES
Daily Note     Today's date: 2023  Patient name: Andrews Soler  : 1995  MRN: [id number]  Referring provider: ROSA Moscoso  Dx:   Encounter Diagnosis     ICD-10-CM    1. Acute midline thoracic back pain  M54.6 Ambulatory Referral to Physical Therapy          Start Time: 1115  Stop Time: 1200  Total time in clinic (min): 45 minutes    Subjective: Patient tolerated HEP fairly. Continues to have pain. Objective: See treatment diary below      Assessment: Patient tolerated first treatment session well today with focus on postural endurance and strength. Patient responded well to strengthening exercises. She reported slight discomfort with UBE, however, was able to perform. Patient will continue to be appropriate for skilled outpatient physical therapy in order to address impairments. 1:1 with Jean-Pierre Brown, PT, DPT for entirety of treatment session. Plan: Continue per plan of care. Progress treatment as tolerated. Precautions: none    Access Code: [email or web address]  URL: [email or web address]  Date: 2023  Prepared by: Jean-Pierre Brown    Exercises  - Prone Chest Stretch on Chair  - 1 x daily - 7 x weekly - 3-4 sets - 10 reps - 20-30" hold  - Thoracic Extension Mobilization on Foam Roll  - 1 x daily - 7 x weekly - 2 sets - 10 reps  - Prone Single Arm Shoulder Horizontal Abduction with Dumbbell  - 1 x daily - 7 x weekly - 2-3 sets - 10 reps - 1-2" hold  - Prone Single Arm Shoulder Y with Dumbbell  - 1 x daily - 7 x weekly - 2-3 sets - 10 reps - 1-2" hold    Manuals       Thoracic PA AD       Thoracic Grade 5 Mobilization AD                       Neuro Re-Ed        Towel Slides with Resisted Abduction        Paloff Press        D2 Flexion/Extension UE         Loaded Cane Shoulder Flexion                                Ther Ex        UBE Retro  6' L4      Seated Repeated Thoracic Extension  2x10      Prone I-Y-Ts on Stability Ball  #2  10x eac      Seated Thoracic Flexion to Row  #23  2x20      Resisted High Pulls  #15  2x20                              Ther Activity                        Gait Training                        Modalities

## 2023-09-12 ENCOUNTER — OFFICE VISIT (OUTPATIENT)
Dept: PHYSICAL THERAPY | Facility: REHABILITATION | Age: 28
End: 2023-09-12
Payer: COMMERCIAL

## 2023-09-12 DIAGNOSIS — M54.6 ACUTE MIDLINE THORACIC BACK PAIN: Primary | ICD-10-CM

## 2023-09-12 PROCEDURE — 97110 THERAPEUTIC EXERCISES: CPT

## 2023-09-12 NOTE — PROGRESS NOTES
Daily Note     Today's date: 2023  Patient name: Enrico Barrientos  : 1995  MRN: [id number]  Referring provider: ROSA Martin  Dx:   Encounter Diagnosis     ICD-10-CM    1. Acute midline thoracic back pain  M54.6           Start Time: 930  Stop Time: 1000  Total time in clinic (min): 30 minutes      Subjective: Patient was able to perform HEP. Reported no increase in thoracic pain, however, remains present. Objective: See treatment diary below      Assessment: Patient tolerated treatment session well today with focus on postural endurance and strength. Patient was challenged with exercise progressions due to periscapular weakness. Patient fatigued fast with shoulder ER. Patient reported overall soreness the end of the session. Patient will continue to be appropriate for skilled outpatient physical therapy in order to address impairments. 1:1 with Daryl Cadena, PT, DPT for entirety of treatment session. Plan: Continue per plan of care. Progress treatment as tolerated. Precautions: none    Access Code: [email or web address]  URL: [email or web address]  Date: 2023  Prepared by: Colt Kaye    Exercises  - Prone Chest Stretch on Chair  - 1 x daily - 7 x weekly - 3-4 sets - 10 reps - 20-30" hold  - Thoracic Extension Mobilization on Foam Roll  - 1 x daily - 7 x weekly - 2 sets - 10 reps  - Prone Single Arm Shoulder Horizontal Abduction with Dumbbell  - 1 x daily - 7 x weekly - 2-3 sets - 10 reps - 1-2" hold  - Prone Single Arm Shoulder Y with Dumbbell  - 1 x daily - 7 x weekly - 2-3 sets - 10 reps - 1-2" hold    Manuals     Thoracic PA AD      Thoracic Grade 5 Mobilization AD                    Neuro Re-Ed       Standing Foam Wall Rollouts   20x    Paloff Press       D2 Flexion/Extension UE        Loaded Cane Shoulder Flexion                            Ther Ex       UBE Retro  6' L4 7' L4    Seated Repeated Thoracic Extension  2x10 2x10    Prone I-Y-Ts on Stability Ball  #2  10x eac     Seated Thoracic Flexion to Row  #23  2x20 #25  2x20    Resisted High Pulls  #15  2x20 #15  3x20    Standing ER   #4  3x10    Lateral Raise   #4 DB  3x10           Ther Activity                     Gait Training                     Modalities

## 2023-09-20 DIAGNOSIS — M54.6 ACUTE MIDLINE THORACIC BACK PAIN: ICD-10-CM

## 2023-09-20 RX ORDER — NAPROXEN 500 MG/1
500 TABLET ORAL 2 TIMES DAILY WITH MEALS
Qty: 60 TABLET | Refills: 0 | Status: SHIPPED | OUTPATIENT
Start: 2023-09-20

## 2023-09-21 ENCOUNTER — OFFICE VISIT (OUTPATIENT)
Dept: PHYSICAL THERAPY | Facility: REHABILITATION | Age: 28
End: 2023-09-21
Payer: COMMERCIAL

## 2023-09-21 DIAGNOSIS — M54.6 ACUTE MIDLINE THORACIC BACK PAIN: Primary | ICD-10-CM

## 2023-09-21 PROCEDURE — 97110 THERAPEUTIC EXERCISES: CPT

## 2023-09-21 NOTE — PROGRESS NOTES
Daily Note     Today's date: 2023  Patient name: Julio Burnette  : 1995  MRN: [id number]  Referring provider: Cisco Fabry, CRNP  Dx:   Encounter Diagnosis     ICD-10-CM    1. Acute midline thoracic back pain  M54.6           Start Time: 945  Stop Time: 1030  Total time in clinic (min): 45 minutes      Subjective: Patient presents with no significant changes or concerns since last visit. Objective: See treatment diary below      Assessment: Patient tolerated treatment session well today with focus on postural endurance and strength. Patient continues to be challenged with therex. Reports thoracic and shoulder musculature fatigue at the end of the session. Patient will continue to be appropriate for skilled outpatient physical therapy in order to address impairments and prepare for surgery. 1:1 with Yvette Rodriguez, PT, DPT for entirety of treatment session. Plan: Continue per plan of care. Progress treatment as tolerated. Precautions: none    Access Code: [email or web address]  URL: [email or web address]  Date: 2023  Prepared by: El Chapa    Exercises  - Prone Chest Stretch on Chair  - 1 x daily - 7 x weekly - 3-4 sets - 10 reps - 20-30" hold  - Thoracic Extension Mobilization on Foam Roll  - 1 x daily - 7 x weekly - 2 sets - 10 reps  - Prone Single Arm Shoulder Horizontal Abduction with Dumbbell  - 1 x daily - 7 x weekly - 2-3 sets - 10 reps - 1-2" hold  - Prone Single Arm Shoulder Y with Dumbbell  - 1 x daily - 7 x weekly - 2-3 sets - 10 reps - 1-2" hold    Manuals    Thoracic PA AD      Thoracic Grade 5 Mobilization AD                    Neuro Re-Ed       Standing Foam Wall Rollouts   20x    Paloff Press    NV   D2 Flexion/Extension UE        Loaded Cane Shoulder Flexion    NV                        Ther Ex       UBE Retro  6' L4 7' L4 8' L 4   Seated Repeated Thoracic Extension  2x10 2x10    Prone I-Y-Ts on Stability Ball  #2  10x eac  #2  2x10 ea Seated Thoracic Flexion to Row  #23  2x20 #25  2x20 NV to #30   Resisted High Pulls  #15  2x20 #15  3x20 #18  3x15   Standing ER   #4  3x10 B #4  3x10 B   Lateral Raise   #4 DB  3x10 #4 DB  3x10          Ther Activity                     Gait Training                     Modalities

## 2023-09-27 NOTE — PROGRESS NOTES
Pt now has active medicaid  Pt outreach not needed by me at this time Albendazole Counseling:  I discussed with the patient the risks of albendazole including but not limited to cytopenia, kidney damage, nausea/vomiting and severe allergy.  The patient understands that this medication is being used in an off-label manner.

## 2023-09-28 ENCOUNTER — OFFICE VISIT (OUTPATIENT)
Dept: PHYSICAL THERAPY | Facility: REHABILITATION | Age: 28
End: 2023-09-28
Payer: COMMERCIAL

## 2023-09-28 DIAGNOSIS — M54.6 ACUTE MIDLINE THORACIC BACK PAIN: Primary | ICD-10-CM

## 2023-09-28 PROCEDURE — 97110 THERAPEUTIC EXERCISES: CPT

## 2023-09-28 NOTE — PROGRESS NOTES
Daily Note     Today's date: 2023  Patient name: Antoine Peterson  : 1995  MRN: [id number]  Referring provider: ROSA Salomon  Dx:   Encounter Diagnosis     ICD-10-CM    1. Acute midline thoracic back pain  M54.6                      Subjective: Patient noted a little bit more pain in upper back today. Objective: See treatment diary below      Assessment: Tolerated treatment fair. Patient performed exercises to patient tolerance today. Added in [facility] press without complaints. Patient exhibited good technique with therapeutic exercises and would benefit from continued PT      Plan: Continue per plan of care. Precautions: none    Access Code: [email or web address]  URL: [email or web address]  Date: 2023  Prepared by: Jazzmine Anand    Exercises  - Prone Chest Stretch on Chair  - 1 x daily - 7 x weekly - 3-4 sets - 10 reps - 20-30" hold  - Thoracic Extension Mobilization on Foam Roll  - 1 x daily - 7 x weekly - 2 sets - 10 reps  - Prone Single Arm Shoulder Horizontal Abduction with Dumbbell  - 1 x daily - 7 x weekly - 2-3 sets - 10 reps - 1-2" hold  - Prone Single Arm Shoulder Y with Dumbbell  - 1 x daily - 7 x weekly - 2-3 sets - 10 reps - 1-2" hold    Manuals    Thoracic PA       Thoracic Grade 5 Mobilization                     Neuro Re-Ed       Standing Foam Wall Rollouts   20x    Paloff Press 1 OTB x 15 ea   NV   D2 Flexion/Extension UE        Loaded Cane Shoulder Flexion NV   NV                        Ther Ex       UBE Retro 8' L4 6' L4 7' L4 8' L 4   Seated Repeated Thoracic Extension  2x10 2x10    Prone I-Y-Ts on Stability Ball #2  2x10 ea #2  10x eac  #2  2x10 ea   Seated Thoracic Flexion to Row #25 2 x 15-20x #23  2x20 #25  2x20 NV to #30   Resisted High Pulls #18 3 x 15  #15  2x20 #15  3x20 #18  3x15   Standing ER #4  3x10 B  #4  3x10 B #4  3x10 B   Lateral Raise #4 DB  3x10  #4 DB  3x10 #4 DB  3x10          Ther Activity                     Gait Training                     Modalities

## 2023-10-05 ENCOUNTER — OFFICE VISIT (OUTPATIENT)
Dept: PHYSICAL THERAPY | Facility: REHABILITATION | Age: 28
End: 2023-10-05
Payer: COMMERCIAL

## 2023-10-05 DIAGNOSIS — M54.6 ACUTE MIDLINE THORACIC BACK PAIN: Primary | ICD-10-CM

## 2023-10-05 PROCEDURE — 97110 THERAPEUTIC EXERCISES: CPT

## 2023-10-05 PROCEDURE — 97112 NEUROMUSCULAR REEDUCATION: CPT

## 2023-10-05 NOTE — PROGRESS NOTES
Discharge Note     Today's date: 10/5/2023  Patient name: Tila Thompson  : 1995  MRN: [id number]  Referring provider: ROSA Mann  Dx:   Encounter Diagnosis     ICD-10-CM    1. Acute midline thoracic back pain  M54.6           Start Time: 945  Stop Time: 1023  Total time in clinic (min): 38 minutes    Subjective: Patient presents for her last visit for authorization for breast reduction procedure. Although overall she feels the exercises has helped with the intensity of the pain, she continues to notice prolonged positions continue to be bothersome secondary to macromastia. She would like to continue postural strengthening and endurance exercises now that she has returned to the gym. She has a follow-up with her PCP to get referral to plastic surgery next week. Objective: See treatment diary below    Pain  Current pain ratin  At best pain ratin  At worst pain ratin  Location: Mid-Thoracic and Upper Trap   Quality: pressure, dull ache, discomfort and radiating  Relieving factors: change in position, heat, medications, rest and support  Aggravating factors: sitting, walking, standing, overhead activity, lifting and running (prolonged positions 20-30 mins)      Short Term Goals: In 4-6 weeks, the patient will:  1. Pt will report at least a 25% reduction in subjective pain complaints/symptoms to better manage ADLs and household chores. MET  2. Pt will be able to tolerate atleast 10 mins of prolonged positions MET  3. Pt independent with initial HEP, rationale, technique and frequency, for ROM and pain control. MET  4. Pt will have atleast 10% improvement in her thoracic extension MET      Long Term Goals: In 6 weeks, the patient will:  1. Pt will be able to tolerate atleast 20-30 minutes of prolonged positions NOT MET  2. FOTO to greater than predicted value. MET  3. Independent with comprehensive HEP upon discharge. MET  4.  Pt will be able to perform ADLs, iADLS, and household duties with minimal restriction indicating return to PLOF. MET  5. Pt independent with rationale, technique and plan for performance of advanced HEP to ensure independent self-management of symptoms upon discharge. MET  6. Pt will have WNL thoracic extension and trapezius strength. MET    Thoracic ROM:  Flexion: WNL   Extension: Limited P! Rotation: WNL  Lateral Flexion: WNL    Active ROM: Goniometric measurement revealed the following findings. Shoulder ROM Right Left   Flexion WNL WNL   Abduction WNL WNL   BTH WNL WNL   BTB WNL WNL       Strength: MMT revealed the following findings. Joint Motion Right Left   Sh. Flexion 5/5 5/5   Sh. Elevation 4+/5 4+/5   Sh. Abduction 4+/5 4+/5   Sh. ER 5/5 5/5   Sh. IR 5/5 5/5   Shoulder extension 4/5 4+/5   Mid-Trap 5/5 5/5   Lower Trap  4/5 4/5       Assessment: Patient overall responded fairly to 6 weeks of postural endurance and strengthening exercises, will benefit to return to PT post-breast reduction surgery if continues to have thoracic pain with surgery recovery. At this time, patient has met all short-terms goals with pain being more manageable, however, I agree with patient as most patients with chronic thoracic pain secondary to macromastia do find relief with a reduction in conjunction with postural strengthening. In the mean time, patient will benefit from advance HEP for gym as she plans for future procedure. I discussed if she has any questions regarding the exercises or pain becomes severe may return to physical therapy at any time - pt verbally understanding. Pt able to demonstrate HEP with good technique and no pain. Educated pt to stop any exercises causing pain increase, pt verbalizes understanding. Plan: Discharge     Precautions: none    Access Code: [email or web address]  URL: [email or web address]  Date: 08/30/2023  Prepared by: Nellie Vargas    Exercises  - Prone Chest Stretch on Chair  - 1 x daily - 7 x weekly - 3-4 sets - 10 reps - 20-30" hold  - Thoracic Extension Mobilization on Foam Roll  - 1 x daily - 7 x weekly - 2 sets - 10 reps  - Prone Single Arm Shoulder Horizontal Abduction with Dumbbell  - 1 x daily - 7 x weekly - 2-3 sets - 10 reps - 1-2" hold  - Prone Single Arm Shoulder Y with Dumbbell  - 1 x daily - 7 x weekly - 2-3 sets - 10 reps - 1-2" hold      Access Code: [email or web address]  URL: [email or web address]  Date: 10/05/2023  Prepared by: Te Rahman    Exercises  - Seated Row Cable Machine  - 1 x daily - 2 x weekly - 3-4 sets - 10-12 reps  - Lat Pull Down  - 1 x daily - 2 x weekly - 3-4 sets - 10-12 reps  - Standing Cable Shoulder External Rotation   - 1 x daily - 2-3 x weekly - 3 sets - 15-20 reps  - Shoulder External Rotation at 90 Degrees with Cables  - 1 x daily - 2 x weekly - 2 sets - 20 reps  - Face Pulls  - 1 x daily - 2 x weekly - 3 sets - 15-20 reps    Manuals 9/28 9/7 9/12 9/21 10/5   Thoracic PA        Thoracic Grade 5 Mobilization                        Neuro Re-Ed             Progn/Self Care/Postural Education    AR 8'   Standing Foam Wall Rollouts   20x     Paloff Press 1 OTB x 15 ea   NV      D2 Flexion/Extension UE                                 Ther Ex        UBE Retro 8' L4 6' L4 7' L4 8' L 4 8' L5        Updated HEP see above   Seated Repeated Thoracic Extension  2x10 2x10     Prone I-Y-Ts on Stability Ball #2  2x10 ea #2  10x eac  #2  2x10 ea    Seated Thoracic Flexion to Row #25 2 x 15-20x #23  2x20 #25  2x20 NV to #30    Resisted High Pulls #18 3 x 15  #15  2x20 #15  3x20 #18  3x15    Standing ER #4  3x10 B  #4  3x10 B #4  3x10 B    Lateral Raise #4 DB  3x10  #4 DB  3x10 #4 DB  3x10            Ther Activity                        Gait Training                        Modalities

## 2023-10-09 ENCOUNTER — OFFICE VISIT (OUTPATIENT)
Dept: FAMILY MEDICINE CLINIC | Facility: CLINIC | Age: 28
End: 2023-10-09
Payer: COMMERCIAL

## 2023-10-09 VITALS
TEMPERATURE: 96.6 F | HEART RATE: 68 BPM | DIASTOLIC BLOOD PRESSURE: 86 MMHG | BODY MASS INDEX: 33.4 KG/M2 | SYSTOLIC BLOOD PRESSURE: 150 MMHG | WEIGHT: 207.8 LBS | OXYGEN SATURATION: 99 % | HEIGHT: 66 IN

## 2023-10-09 DIAGNOSIS — M54.6 ACUTE MIDLINE THORACIC BACK PAIN: ICD-10-CM

## 2023-10-09 DIAGNOSIS — N62 LARGE BREASTS: Primary | ICD-10-CM

## 2023-10-09 PROCEDURE — 99213 OFFICE O/P EST LOW 20 MIN: CPT

## 2023-10-09 NOTE — ASSESSMENT & PLAN NOTE
Large breast (40G) affecting every day life   Has chronic back pain in the thoracic back  Indentations on shoulder from bras   Has had rashes in the past underneath breasts   Referral to physical therapy completed   · Completed 6 weeks of PT with out relief of symptoms   Referral placed today for plastic surgery

## 2023-10-09 NOTE — ASSESSMENT & PLAN NOTE
Midline thoracic back pain related to large breasts   Refer to physical therapy  · Has completed 6 weeks of physical therapy without relief of symptoms   Referral placed today for plastic surgery

## 2023-10-11 ENCOUNTER — TELEPHONE (OUTPATIENT)
Dept: PLASTIC SURGERY | Facility: CLINIC | Age: 28
End: 2023-10-11

## 2023-10-11 NOTE — TELEPHONE ENCOUNTER
Tried to call several times but got disconnected. Patient needs 3 full months of physical therapy notes for breast reduction consultation. Had one visit in Aug.  Has September, needs rest of October and November.

## 2023-10-18 ENCOUNTER — TELEPHONE (OUTPATIENT)
Age: 28
End: 2023-10-18

## 2023-10-18 ENCOUNTER — TELEPHONE (OUTPATIENT)
Dept: FAMILY MEDICINE CLINIC | Facility: CLINIC | Age: 28
End: 2023-10-18

## 2023-10-18 ENCOUNTER — TELEPHONE (OUTPATIENT)
Dept: PLASTIC SURGERY | Facility: CLINIC | Age: 28
End: 2023-10-18

## 2023-10-18 NOTE — TELEPHONE ENCOUNTER
Wants to speak to Kareem Sarmiento regarding her breast reduction surgery   needs PT  must she come to office or can she just get the referral to PT ?   Daphnie Mckeon  [phone]

## 2023-10-18 NOTE — TELEPHONE ENCOUNTER
Pt calling to schedule consult for breast reduction, please call at earliest convenience.  Thank you!!

## 2023-10-18 NOTE — TELEPHONE ENCOUNTER
Spoke to patient and reviewed criteria for breast reduction and emailed for reference. She will let me know when all of October and November physical therapy are complete so we can schedule a consultation.

## 2023-10-19 DIAGNOSIS — N62 LARGE BREASTS: Primary | ICD-10-CM

## 2023-10-23 DIAGNOSIS — M54.6 ACUTE MIDLINE THORACIC BACK PAIN: ICD-10-CM

## 2023-10-24 RX ORDER — NAPROXEN 500 MG/1
500 TABLET ORAL 2 TIMES DAILY WITH MEALS
Qty: 60 TABLET | Refills: 0 | Status: SHIPPED | OUTPATIENT
Start: 2023-10-24

## 2023-10-26 NOTE — PROGRESS NOTES
Daily Note     Today's date: 10/30/2023  Patient name: Colleen Huggins  : 1995  MRN: [id number]  Referring provider: ROSA Cherry  Dx:   Encounter Diagnosis     ICD-10-CM    1. Acute midline thoracic back pain  M54.6       2. Large breasts  N62 Ambulatory Referral to Physical Therapy          Start Time: 6710  Stop Time: 1300  Total time in clinic (min): 40 minutes    Subjective: Patient returns after being discharge. Per insurance, wishes for her to continue to trial conservative care before surgical consideration. Patient presents in increase pain, fatigue and psychological distress from home. Objective: See treatment diary below      Assessment: Tolerated treatment fair. Patient was limited in participation during today's session due to increase external stressors and psychological distress. We focused mostly on pain management education through ensuring safety and self-care was established. Plan to continue to progress her in postural endurance and strengthening exercises over the next few weeks. Patient will continue to be appropriate for skilled outpatient physical therapy in order to address impairments and prepare for surgery. 1:1 with Iesha Bell PT, DPT for entirety of treatment session. Plan: Continue per plan of care. Progress treatment as tolerated. Precautions: none    Access Code: [email or web address]  URL: [email or web address]  Date: 2023  Prepared by: Etienne Garcia    Exercises  - Prone Chest Stretch on Chair  - 1 x daily - 7 x weekly - 3-4 sets - 10 reps - 20-30" hold  - Thoracic Extension Mobilization on Foam Roll  - 1 x daily - 7 x weekly - 2 sets - 10 reps  - Prone Single Arm Shoulder Horizontal Abduction with Dumbbell  - 1 x daily - 7 x weekly - 2-3 sets - 10 reps - 1-2" hold  - Prone Single Arm Shoulder Y with Dumbbell  - 1 x daily - 7 x weekly - 2-3 sets - 10 reps - 1-2" hold      Access Code: [email or web address]  URL: [email or web address]  Date: 10/05/2023  Prepared by: Gerald Kohler    Exercises  - Seated Row Cable Machine  - 1 x daily - 2 x weekly - 3-4 sets - 10-12 reps  - Lat Pull Down  - 1 x daily - 2 x weekly - 3-4 sets - 10-12 reps  - Standing Cable Shoulder External Rotation   - 1 x daily - 2-3 x weekly - 3 sets - 15-20 reps  - Shoulder External Rotation at 90 Degrees with Cables  - 1 x daily - 2 x weekly - 2 sets - 20 reps  - Face Pulls  - 1 x daily - 2 x weekly - 3 sets - 15-20 reps    Manuals 9/28 9/7 9/12 9/21 10/5 10/30   Thoracic PA         Thoracic Grade 5 Mobilization                           Neuro Re-Ed              Progn/Self Care/Postural Education    AR 8' Progn/Self Care/Postural Education    AR 30'   Standing Foam Wall Rollouts   20x      Paloff Press 1 OTB x 15 ea   NV       D2 Flexion/Extension UE                                     Ther Ex         UBE Retro 8' L4 6' L4 7' L4 8' L 4 8' L5 8' L3        Updated HEP see above    Seated Repeated Thoracic Extension  2x10 2x10      Prone I-Y-Ts on Stability Ball #2  2x10 ea #2  10x eac  #2  2x10 ea     Seated Thoracic Flexion to Row #25 2 x 15-20x #23  2x20 #25  2x20 NV to #30     Resisted High Pulls #18 3 x 15  #15  2x20 #15  3x20 #18  3x15     Standing ER #4  3x10 B  #4  3x10 B #4  3x10 B     Lateral Raise #4 DB  3x10  #4 DB  3x10 #4 DB  3x10              Ther Activity                           Gait Training                           Modalities

## 2023-10-30 ENCOUNTER — OFFICE VISIT (OUTPATIENT)
Dept: PHYSICAL THERAPY | Facility: REHABILITATION | Age: 28
End: 2023-10-30
Payer: COMMERCIAL

## 2023-10-30 DIAGNOSIS — N62 LARGE BREASTS: ICD-10-CM

## 2023-10-30 DIAGNOSIS — M54.6 ACUTE MIDLINE THORACIC BACK PAIN: Primary | ICD-10-CM

## 2023-10-30 PROCEDURE — 97112 NEUROMUSCULAR REEDUCATION: CPT

## 2023-10-30 PROCEDURE — 97110 THERAPEUTIC EXERCISES: CPT

## 2023-11-08 ENCOUNTER — OFFICE VISIT (OUTPATIENT)
Dept: PHYSICAL THERAPY | Facility: REHABILITATION | Age: 28
End: 2023-11-08
Payer: COMMERCIAL

## 2023-11-08 DIAGNOSIS — M54.6 ACUTE MIDLINE THORACIC BACK PAIN: ICD-10-CM

## 2023-11-08 DIAGNOSIS — N62 LARGE BREASTS: Primary | ICD-10-CM

## 2023-11-08 PROCEDURE — 97110 THERAPEUTIC EXERCISES: CPT

## 2023-11-08 PROCEDURE — 97112 NEUROMUSCULAR REEDUCATION: CPT
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
## 2023-11-09 ENCOUNTER — APPOINTMENT (OUTPATIENT)
Dept: PHYSICAL THERAPY | Facility: REHABILITATION | Age: 28
End: 2023-11-09
Payer: COMMERCIAL

## 2023-11-13 ENCOUNTER — OFFICE VISIT (OUTPATIENT)
Dept: PHYSICAL THERAPY | Facility: REHABILITATION | Age: 28
End: 2023-11-13
Payer: COMMERCIAL

## 2023-11-13 DIAGNOSIS — M54.6 ACUTE MIDLINE THORACIC BACK PAIN: ICD-10-CM

## 2023-11-13 DIAGNOSIS — N62 LARGE BREASTS: Primary | ICD-10-CM

## 2023-11-13 PROCEDURE — 97112 NEUROMUSCULAR REEDUCATION: CPT

## 2023-11-13 PROCEDURE — 97110 THERAPEUTIC EXERCISES: CPT

## 2023-11-13 NOTE — PROGRESS NOTES
Daily Note     Today's date: 2023  Patient name: Kadie Benoit  : 1995  MRN: [id number]  Referring provider: ROSA Drew  Dx:   Encounter Diagnosis     ICD-10-CM    1. Large breasts  N62       2. Acute midline thoracic back pain  M54.6           Start Time: 1220  Stop Time: 1300  Total time in clinic (min): 40 minutes    Subjective: Patient is doing better today. Objective: See treatment diary below      Assessment: Patient tolerated treatment session well today with focus on progressing in thoracic periscapular strengthening. Patient reported challenge with thoracic paraspinals with A-Walk exercise secondary to postural weakness. Patient continues to report thoracic pain when endurance and strength are challenged. Patient will continue to be appropriate for skilled outpatient physical therapy in order to address impairments. 1:1 with Moises Littlejohn, PT, DPT for entirety of treatment session. Plan: Continue per plan of care. Progress treatment as tolerated. Precautions: none      POC expires Unit limit Auth Expiration date PT/OT + Visit Limit?   23 BOMN 23 BOMN         Visit/Unit Tracking  AUTH Status:  Date         BOMN Used 9         Remaining  15           Pertinent Findings:                                                                                                                                                     Test / Measure  23   FOTO 38    Mid-Lower Trap 4-/5 4-/5   Thoracic Extension Restricted and P! improving             Access Code: [email or web address]  URL: [email or web address]  Date: 2023  Prepared by: Viviana Cruz    Exercises  - Prone Chest Stretch on Chair  - 1 x daily - 7 x weekly - 3-4 sets - 10 reps - 20-30" hold  - Thoracic Extension Mobilization on Foam Roll  - 1 x daily - 7 x weekly - 2 sets - 10 reps  - Prone Single Arm Shoulder Horizontal Abduction with Dumbbell  - 1 x daily - 7 x weekly - 2-3 sets - 10 reps - 1-2" hold  - Prone Single Arm Shoulder Y with Dumbbell  - 1 x daily - 7 x weekly - 2-3 sets - 10 reps - 1-2" hold      Access Code: [email or web address]  URL: [email or web address]  Date: 10/05/2023  Prepared by: Reyes Cage    Exercises  - Seated Row Cable Machine  - 1 x daily - 2 x weekly - 3-4 sets - 10-12 reps  - Lat Pull Down  - 1 x daily - 2 x weekly - 3-4 sets - 10-12 reps  - Standing Cable Shoulder External Rotation   - 1 x daily - 2-3 x weekly - 3 sets - 15-20 reps  - Shoulder External Rotation at 90 Degrees with Cables  - 1 x daily - 2 x weekly - 2 sets - 20 reps  - Face Pulls  - 1 x daily - 2 x weekly - 3 sets - 15-20 reps    Appt       9   Manuals 9/7 9/12 9/21 10/5 10/30 11/8 11/13   Thoracic PA                    Thoracic Grade 5 Mobilization                              Neuro Re-Ed              Progn/Self Care/Postural Education    AR 8' Progn/Self Care/Postural Education    AR 30'     Standing Foam Wall Rollouts  20x    20x    Paloff Press   NV      GTB  20x  B   D2 Flexion/Extension UE           Wall Angles      2x10 2x10   Shoulder A Walks with Pulse       2 pulse  10x             Ther Ex          UBE Retro 6' L4 7' L4 8' L 4 8' L5 8' L3 NP 10' L3       Updated HEP see above      Seated Repeated Thoracic Extension 2x10 2x10        Prone I-Y-Ts on Stability Ball #2  10x eac  #2  2x10 ea       Shoulder Extension       GTB  3x10   Seated Thoracic Flexion to Row #23  2x20 #25  2x20 NV to #30                 Maureenberg      #3  20x B    Resisted High Pulls #15  2x20 #15  3x20 #18  3x15       Standing ER  #4  3x10 B #4  3x10 B   #4  2x10 B OTB  2x10 B   Lateral Raise  #4 DB  3x10 #4 DB  3x10       Bicep Curls      #4  2x15-20    Tricep Extension      #15  15, 12, 10    Ther Activity                              Gait Training                              Modalities

## 2023-11-20 ENCOUNTER — OFFICE VISIT (OUTPATIENT)
Dept: PHYSICAL THERAPY | Facility: REHABILITATION | Age: 28
End: 2023-11-20
Payer: COMMERCIAL

## 2023-11-20 DIAGNOSIS — M54.6 ACUTE MIDLINE THORACIC BACK PAIN: Primary | ICD-10-CM

## 2023-11-20 DIAGNOSIS — N62 LARGE BREASTS: ICD-10-CM

## 2023-11-20 PROCEDURE — 97112 NEUROMUSCULAR REEDUCATION: CPT

## 2023-11-20 PROCEDURE — 97110 THERAPEUTIC EXERCISES: CPT

## 2023-11-20 NOTE — PROGRESS NOTES
Daily Note     Today's date: 2023  Patient name: Colleen Huggins  : 1995  MRN: [id number]  Referring provider: ROSA Cherry  Dx:   Encounter Diagnosis     ICD-10-CM    1. Acute midline thoracic back pain  M54.6       2. Large breasts  N62             Start Time: 9703  Stop Time: 8644  Total time in clinic (min): 38 minutes    Subjective: Patient states she is overall doing okay. Notes increase in bra size potentially due to weight gain and inability to participate in the gym. Objective: See treatment diary below      Assessment: Patient tolerated treatment session well today with focus on progressing in thoracic periscapular strengthening. Patient reports challenge with lower trapezius activation, fatigued quickly. Required additional cueing for palloff presses. Patient will continue to be appropriate for skilled outpatient physical therapy in order to address impairments. 1:1 with Iesha Bell, PT, DPT for entirety of treatment session. Plan: Continue per plan of care. Progress treatment as tolerated. Precautions: none      POC expires Unit limit Auth Expiration date PT/OT + Visit Limit?   23 BOMN 23 BOMN         Visit/Unit Tracking  AUTH Status:  Date        BOMN Used 9 10        Remaining  15 16          Pertinent Findings:                                                                                                                                                     Test / Measure  23   FOTO 38    Mid-Lower Trap 4-/5 4-/5   Thoracic Extension Restricted and P! improving             Access Code: [email or web address]  URL: [email or web address]  Date: 2023  Prepared by: Etienne Garcia    Exercises  - Prone Chest Stretch on Chair  - 1 x daily - 7 x weekly - 3-4 sets - 10 reps - 20-30" hold  - Thoracic Extension Mobilization on Foam Roll  - 1 x daily - 7 x weekly - 2 sets - 10 reps  - Prone Single Arm Shoulder Horizontal Abduction with Dumbbell  - 1 x daily - 7 x weekly - 2-3 sets - 10 reps - 1-2" hold  - Prone Single Arm Shoulder Y with Dumbbell  - 1 x daily - 7 x weekly - 2-3 sets - 10 reps - 1-2" hold      Access Code: [email or web address]  URL: [email or web address]  Date: 10/05/2023  Prepared by: Adela Becker    Exercises  - Seated Row Cable Machine  - 1 x daily - 2 x weekly - 3-4 sets - 10-12 reps  - Lat Pull Down  - 1 x daily - 2 x weekly - 3-4 sets - 10-12 reps  - Standing Cable Shoulder External Rotation   - 1 x daily - 2-3 x weekly - 3 sets - 15-20 reps  - Shoulder External Rotation at 90 Degrees with Cables  - 1 x daily - 2 x weekly - 2 sets - 20 reps  - Face Pulls  - 1 x daily - 2 x weekly - 3 sets - 15-20 reps    Appt     9 10   Manuals 9/21 10/5 10/30 11/8 11/13 11/20   Thoracic PA                  Thoracic Grade 5 Mobilization                           Neuro Re-Ed           Progn/Self Care/Postural Education    AR 8' Progn/Self Care/Postural Education    AR 30'      Standing Foam Wall Rollouts    20x     Paloff Press NV      GTB  20x  B GTB  20x B   Wall Angles    2x10 2x10 2x10   Shoulder A Walks with Pulse     2 pulse  10x  OTB 2 pulses  12x  OTB      Forward Towel Slides with Resisted Abduction      OTB  20x   Ther Ex         UBE Retro 8' L 4 8' L5 8' L3 NP 10' L3 8' L3     Updated HEP see above       Prone I-Y-Ts on Stability Ball #2  2x10 ea        Shoulder Extension     GTB  3x10    Seated Thoracic Flexion to Row NV to #30        Maureenberg    #3  20x B     Resisted High Pulls #18  3x15        Standing ER #4  3x10 B   #4  2x10 B OTB  2x10 B    Lateral Raise #4 DB  3x10     Thumbs Out  #2  3x10   Bicep Curls    #4  2x15-20     Tricep Extension    #15  15, 12, 10     Ther Activity                           Gait Training                           Modalities

## 2023-11-28 ENCOUNTER — OFFICE VISIT (OUTPATIENT)
Dept: PHYSICAL THERAPY | Facility: REHABILITATION | Age: 28
End: 2023-11-28
Payer: COMMERCIAL

## 2023-11-28 DIAGNOSIS — N62 LARGE BREASTS: ICD-10-CM

## 2023-11-28 DIAGNOSIS — M54.6 ACUTE MIDLINE THORACIC BACK PAIN: Primary | ICD-10-CM

## 2023-11-28 PROCEDURE — 97110 THERAPEUTIC EXERCISES: CPT

## 2023-11-28 NOTE — PROGRESS NOTES
Daily Note     Today's date: 2023  Patient name: Tila Thompson  : 1995  MRN: [id number]  Referring provider: ROSA Mann  Dx:   Encounter Diagnosis     ICD-10-CM    1. Acute midline thoracic back pain  M54.6       2. Large breasts  N62               Start Time: 8750  Stop Time: 1500  Total time in clinic (min): 45 minutes    Subjective: Patient states she is overall doing okay. Objective: See treatment diary below      Assessment: Patient tolerated treatment session well today. She continues to be limited her reduce postural endurance and increase pain secondary to macromastia. She stated relief with repeated shoulder extension walking As. Patient reports thoracic fatigue at the end of the session. Patient will continue to be appropriate for skilled outpatient physical therapy in order to address impairments. 1:1 with Asif Navarro, PT, DPT for entirety of treatment session. Plan: Continue per plan of care. Progress treatment as tolerated. Precautions: none      POC expires Unit limit Auth Expiration date PT/OT + Visit Limit?   23 BOMN 23 BOMN         Visit/Unit Tracking  AUTH Status:  Date       BOMN Used 9 10 11       Remaining  15 16 17         Pertinent Findings:                                                                                                                                                     Test / Measure  23   FOTO 38    Mid-Lower Trap 4-/5 4-/5   Thoracic Extension Restricted and P! improving             Access Code: [email or web address]  URL: [email or web address]  Date: 2023  Prepared by: Nicci Liao    Exercises  - Prone Chest Stretch on Chair  - 1 x daily - 7 x weekly - 3-4 sets - 10 reps - 20-30" hold  - Thoracic Extension Mobilization on Foam Roll  - 1 x daily - 7 x weekly - 2 sets - 10 reps  - Prone Single Arm Shoulder Horizontal Abduction with Dumbbell  - 1 x daily - 7 x weekly - 2-3 sets - 10 reps - 1-2" hold  - Prone Single Arm Shoulder Y with Dumbbell  - 1 x daily - 7 x weekly - 2-3 sets - 10 reps - 1-2" hold      Access Code: [email or web address]  URL: [email or web address]  Date: 10/05/2023  Prepared by: Samuel Diaz    Exercises  - Seated Row Cable Machine  - 1 x daily - 2 x weekly - 3-4 sets - 10-12 reps  - Lat Pull Down  - 1 x daily - 2 x weekly - 3-4 sets - 10-12 reps  - Standing Cable Shoulder External Rotation   - 1 x daily - 2-3 x weekly - 3 sets - 15-20 reps  - Shoulder External Rotation at 90 Degrees with Cables  - 1 x daily - 2 x weekly - 2 sets - 20 reps  - Face Pulls  - 1 x daily - 2 x weekly - 3 sets - 15-20 reps    Appt     9 10 11   Manuals 9/21 10/5 10/30 11/8 11/13 11/20 11/28   Thoracic PA                    Thoracic Grade 5 Mobilization                              Neuro Re-Ed            Progn/Self Care/Postural Education    AR 8' Progn/Self Care/Postural Education    AR 30'       Standing Foam Wall Rollouts    20x      Paloff Press NV      GTB  20x  B GTB  20x B    Wall Angles    2x10 2x10 2x10    Shoulder A Walks with Pulse     2 pulse  10x  OTB 2 pulses  12x  OTB    2 pulses  12x  OTB   Forward Towel Slides with Resisted Abduction      OTB  20x    Ther Ex          UBE Retro 8' L 4 8' L5 8' L3 NP 10' L3 8' L3 8' L3     Updated HEP see above        Prone I-Y-Ts on Stability Ball #2  2x10 ea         Shoulder Extension     GTB  3x10  #12   3x10   Seated Thoracic Flexion to Row NV to #30         Maureenberg    #3  20x B      Resisted High Pulls #18  3x15         Standing ER #4  3x10 B   #4  2x10 B OTB  2x10 B  #4  2x10 B   Standing IR       #5  2x10 B   Lateral Raise #4 DB  3x10     Thumbs Out  #2  3x10    Bicep Curls    #4  2x15-20      Tricep Extension    #15  15, 12, 10      Ther Activity                              Gait Training                              Modalities

## 2023-12-04 ENCOUNTER — OFFICE VISIT (OUTPATIENT)
Dept: PHYSICAL THERAPY | Facility: REHABILITATION | Age: 28
End: 2023-12-04
Payer: COMMERCIAL

## 2023-12-04 DIAGNOSIS — M54.6 ACUTE MIDLINE THORACIC BACK PAIN: Primary | ICD-10-CM

## 2023-12-04 DIAGNOSIS — N62 LARGE BREASTS: ICD-10-CM

## 2023-12-04 PROCEDURE — 97164 PT RE-EVAL EST PLAN CARE: CPT

## 2023-12-04 PROCEDURE — 97110 THERAPEUTIC EXERCISES: CPT

## 2023-12-04 NOTE — PROGRESS NOTES
Discharge Note     Today's date: 2023  Patient name: Thiago Santos  : 1995  MRN: [id number]  Referring provider: ROSA Contreras  Dx:   Encounter Diagnosis     ICD-10-CM    1. Acute midline thoracic back pain  M54.6       2. Large breasts  N62           Start Time: 1400  Stop Time: 2689  Total time in clinic (min): 38 minutes    Subjective: Patient states she has been responding fairly to exercises. She continues to have significant thoracic pain with prolonged standing during household chores i.e laundry, vacuuming (approx 30 minutes), reaching, bending forward, and holding her children. She notes trial of different bras and ergonomic set ups at home with no variances in pain. Patient brings up family heredity of macromastia with maternal mom and aunt going through same breast reduction procedure in order to find relief of chronic thoracic pain. Objective: See treatment diary below    Pain:  Current: 0/10  Best: 0/10  Worst: 8-9/10      Strength: MMT revealed the following findings. Joint Motion Right 23 Left 23   Sh. Flexion 5/5 5/5   Sh. Elevation 4+/5 4+/5   Sh. Abduction 4+/5 4+/5   Sh. ER 5/5 5/5   Sh. IR 5/5 5/5   Shoulder extension 4/5 4+/5   Mid-Trap 5/5 5/5   Lower Trap  4/5 4/5     Expiration of POC: 23    In 4-6 weeks, the patient will:  1. Pt will report at least a 25% reduction in subjective pain complaints/symptoms to better manage ADLs and household chores. PARTIALLY MET  2. Pt will be able to tolerate atleast 10 mins of prolonged positions MET  3. Pt independent with initial HEP, rationale, technique and frequency, for ROM and pain control. MET  4. Pt will have atleast 10% improvement in her thoracic extension MET      Long Term Goals: In 6 weeks, the patient will:  1. Pt will be able to tolerate atleast 20-30 minutes of prolonged positions NOT MET  2. FOTO to greater than predicted value. MET  3. Independent with comprehensive HEP upon discharge. MET  4. Pt will be able to perform ADLs, iADLS, and household duties with minimal restriction indicating return to PLOF. MET  5. Pt independent with rationale, technique and plan for performance of advanced HEP to ensure independent self-management of symptoms upon discharge. MET  6. Pt will have WNL thoracic extension and trapezius strength. NOT MET    Assessment: Patient presents for her final PT required visit for pre-op requirement to be a candidate for breast reduction. We continued to focus on postural endurance and strengthening exercises. Despite patient attempting behavior modification variables including bra structures, thoracic and UE strength, she continues to be limited with postural deficits secondary to macromastia. At this time, patient has mostly met all her short-terms goals with pain being only temporality managed, however, I agree with patient as most patients with chronic thoracic pain secondary to macromastia do find relief with a reduction in conjunction with postural strengthening. In the mean time, educated patient she will benefit from advance HEP for gym as she plans for future procedure. I discussed if she has any questions regarding the exercises or pain becomes severe may return to physical therapy at any time - pt verbally understanding. Pt able to demonstrate HEP with good technique and no pain. Educated pt to stop any exercises causing pain increase, pt verbalizes understanding.      Plan: Discharged with HEP     Precautions: none      POC expires Unit limit Auth Expiration date PT/OT + Visit Limit?   12/25/23 BOMN 12/31/23 BOMN         Visit/Unit Tracking  AUTH Status:  Date 11/13 11/20 11/28 12/4     BOMN Used 9 10 11 10      Remaining  15 16 17 16        Pertinent Findings:                                                                                                                                                     Test / Measure  8/30/2023 11/13/23 12/4/23   FOTO 38     Mid-Lower Trap 4-/5 4-/5 4/5   Thoracic Extension Restricted and P! improving Painful at end-range           Access Code: [email or web address]  URL: [email or web address]  Date: 10/05/2023  Prepared by: Jenise Prather    Exercises  - Seated Row Cable Machine  - 1 x daily - 2 x weekly - 3-4 sets - 10-12 reps  - Lat Pull Down  - 1 x daily - 2 x weekly - 3-4 sets - 10-12 reps  - Standing Cable Shoulder External Rotation   - 1 x daily - 2-3 x weekly - 3 sets - 15-20 reps  - Shoulder External Rotation at 90 Degrees with Cables  - 1 x daily - 2 x weekly - 2 sets - 20 reps  - Face Pulls  - 1 x daily - 2 x weekly - 3 sets - 15-20 reps    Appt     9 10 11 12   Manuals 9/21 10/5 10/30 11/8 11/13 11/20 11/28 12/4   Thoracic PA                      Thoracic Grade 5 Mobilization                                 Neuro Re-Ed             Progn/Self Care/Postural Education    AR 8' Progn/Self Care/Postural Education    AR 30'        Standing Foam Wall Rollouts    20x       Paloff Press NV      GTB  20x  B GTB  20x B     Wall Angles    2x10 2x10 2x10  2x10   Shoulder A Walks with Pulse     2 pulse  10x  OTB 2 pulses  12x  OTB    2 pulses  12x  OTB    Forward Towel Slides with Resisted Abduction      OTB  20x     Ther Ex           UBE Retro 8' L 4 8' L5 8' L3 NP 10' L3 8' L3 8' L3 8' L3     Updated HEP see above         Prone I-Y-Ts on Stability Ball #2  2x10 ea          Shoulder Extension     GTB  3x10  #12   3x10 #12  3x10   Seated Thoracic Flexion to Row NV to #30          [place]    #3  20x B       Resisted High Pulls #18  3x15          Standing ER #4  3x10 B   #4  2x10 B OTB  2x10 B  #4  2x10 B #4  2x10 B   Standing IR       #5  2x10 B #5  2x10 B   Lateral Raise #4 DB  3x10     Thumbs Out  #2  3x10  Thumbs Out  #2  3x10   Bicep Curls    #4  2x15-20       Tricep Extension    #15  15, 12, 10       Ther Activity                                 Gait Training                                 Modalities

## 2023-12-05 ENCOUNTER — TELEPHONE (OUTPATIENT)
Age: 28
End: 2023-12-05

## 2023-12-05 NOTE — TELEPHONE ENCOUNTER
Patient called to let us know she has completed her 12 weeks of therapy. She is ready to schedule her consult. I advised I would forward a msg to Anisha Garces who would be in touch to get her started.

## 2024-01-12 ENCOUNTER — CONSULT (OUTPATIENT)
Dept: PLASTIC SURGERY | Facility: CLINIC | Age: 29
End: 2024-01-12
Payer: COMMERCIAL

## 2024-01-12 VITALS
HEIGHT: 66 IN | TEMPERATURE: 96 F | WEIGHT: 203 LBS | BODY MASS INDEX: 32.62 KG/M2 | SYSTOLIC BLOOD PRESSURE: 135 MMHG | HEART RATE: 63 BPM | DIASTOLIC BLOOD PRESSURE: 95 MMHG

## 2024-01-12 DIAGNOSIS — N62 LARGE BREASTS: ICD-10-CM

## 2024-01-12 DIAGNOSIS — M54.6 ACUTE MIDLINE THORACIC BACK PAIN: ICD-10-CM

## 2024-01-12 PROCEDURE — 99205 OFFICE O/P NEW HI 60 MIN: CPT | Performed by: PLASTIC SURGERY

## 2024-01-12 NOTE — PROGRESS NOTES
Plastic Surgery H&P  [facility] Plastic  And Reconstructive Surgery   [address] [place] [id number]      Assessment/Plan:      Assessment:  1. Bilateral Breast hypertrophy with associated chronic back, neck, shoulder pain     Plan:  Patient presents with symptomatic bilateral breast hypertrophy with associated chronic back, neck, shoulder pain.  She has shoulder grooving present with grade 3 ptosis.  Therefore, due to the chronic symptomatic nature, she is an appropriate candidate for breast reduction surgery.  We had a lengthy discussion regarding surgical breast reduction surgery.  I anticipate approximately  650-750g will be removed from each side.  The procedure was discussed and described in detail including operative and recovery time.  The procedure and its associated risks are as follows, but not limited to:  Infection, bleeding, scarring, poor cosmesis, asymmetry, inability to breastfeed, nipple necrosis, skin necrosis, delayed healing, chronic open wound, injury to nerves, blood vessels, or adjacent structures, permanent numbness or hypersensitivity, loss of function, need for additional procedures.  The patient noted her understanding.  She had opportunity for questions.  All were answered.  She desires to proceed.  Photo documentation was taken at today's visit.  We will submit for insurance approval.  MD evaluation has been done.  She is welcome to call or return with any additional questions or concerns.    Subjective      Rosa Maria Causey is a 28 y.o. female who is referred by ROSA Haywood in consultation for evaluation for possible breast reduction.  Patient states he has always been large breasted. It runs ion her family. She wears a bra size 38 G. She states that since having her last child one year ago, she has been dealing with increasing upper back, neck, shoulder pain.  She states her breasts are extremely heavy and she was a supportive underwire bra to attempt supportive  breast.   She also suffers from fungal rashes.  She feels the weight of her breasts with skin to skin contact is likely exacerbating this.  She takes naprosyn as needed for the back and neck pain as prescribed by her PCP.  It only provides temporary relief. She has completed over 3 months of PT with minimal relief.  She is tired of the constant pain.  She is interested in surgical treatment.  She presents today for evaluation.  She denies any personal or family history of breast cancer.  She has not had a mammogram yet.  She has no other breast related complaints.    Past Medical History:   Diagnosis Date   • 40 weeks gestation of pregnancy 2023   • Abnormal glucose affecting pregnancy 2022    Normal repeat 3 hr.    • Anemia    • Anemia affecting pregnancy in third trimester 10/18/2022    11/18/22- hgb9.0/Hct28.9 IV iron transfusions ordered   • COVID-19 vaccine series declined 2023   • Sterilization consult 2023   •  (spontaneous vaginal delivery) 3/6/2023   • Varicella     had vaccines     Past Surgical History:   Procedure Laterality Date   • INDUC[facility]       [facility]   • DE LAPAROSCOPY W/RMVL ADNEXAL STRUCTURES Bilateral 2023    Procedure: LAPAROSCOPIC BILATERAL SALPINGECTOMY;  Surgeon: Gaudencio Rubi MD;  [facility];  Service: Gynecology       Current Outpatient Medications:   •  acetaminophen (TYLENOL) 325 mg tablet, Take 2 tablets (650 mg total) by mouth every 6 (six) hours as needed for headaches, fever or moderate pain, Disp: 30 tablet, Rfl: 0  •  cyclobenzaprine (FLEXERIL) 5 mg tablet, Take 1 tablet (5 mg total) by mouth 2 (two) times a day (Patient not taking: Reported on 2024), Disp: 30 tablet, Rfl: 0  •  naproxen (NAPROSYN) 500 mg tablet, TAKE 1 TABLET(500 MG) BY MOUTH TWICE DAILY WITH MEALS (Patient not taking: Reported on 2024), Disp: 60 tablet, Rfl: 0  •  Prenatal Vit-Fe Fumarate-FA (PRENATAL VITAMIN PO), Take by mouth in  "the morning (Patient not taking: Reported on 1/12/2024), Disp: , Rfl:     No Known Allergies    Family History   Problem Relation Age of Onset   • No Known Problems Mother    • Heart disease Father    • No Known Problems Sister    • No Known Problems Brother    • Diabetes Maternal Grandmother    • No Known Problems Maternal Grandfather    • No Known Problems Paternal Grandmother    • No Known Problems Paternal Grandfather    • Breast cancer Neg Hx    • Colon cancer Neg Hx    • Ovarian cancer Neg Hx        Social History     Socioeconomic History   • Marital status: /Civil Union     Spouse name: Not on file   • Number of children: Not on file   • Years of education: Not on file   • Highest education level: Not on file   Occupational History   • Not on file   Tobacco Use   • Smoking status: Never   • Smokeless tobacco: Never   Vaping Use   • Vaping status: Former   Substance and Sexual Activity   • Alcohol use: Not Currently   • Drug use: Yes     Types: Marijuana     Comment: ocassionally to help with anxiety   • Sexual activity: Yes     Partners: Male     Birth control/protection: None, Female Sterilization   Other Topics Concern   • Not on file   Social History Narrative   • Not on file     Social Determinants of Health     Financial Resource Strain: Not on file   Food Insecurity: Not on file   Transportation Needs: Not on file   Physical Activity: Not on file   Stress: Not on file   Social Connections: Not on file   Intimate Partner Violence: Not on file   Housing Stability: Not on file       Review of Systems  Pertinent items are noted in HPI.      Objective     /95 (BP Location: Right arm, Patient Position: Sitting, Cuff Size: Standard)   Pulse 63   Temp (!) 96 °F (35.6 °C) (Tympanic)   Ht 5' 6\" (1.676 m)   Wt 92.1 kg (203 lb)   BMI 32.77 kg/m²     General:  alert and oriented, in no acute distress   Skin:  normal and no rash or abnormalities   Eyes: conjunctivae/corneas clear. PERRL, EOM's " intact. Fundi benign.   Mouth: MMM no lesions   Lymph Nodes:  Cervical, supraclavicular, and axillary nodes normal.   Lungs:  clear to auscultation bilaterally   Heart:  regular rate and rhythm, S1, S2 normal, no murmur, click, rub or gallop and regular rate and rhythm   Abdomen: soft, non-tender; bowel sounds normal; no masses,  no organomegaly   CVA:  absent   Genitourinary: defer exam   Extremities:  extremities normal, warm and well-perfused; no cyanosis, clubbing, or edema   Neurologic:  Alert and oriented x3. Gait normal. Reflexes and motor strength normal and symmetric. Cranial nerves 2-12 and sensation grossly intact.   Psychiatric:  normal mood, behavior, speech, dress, and thought processes      B/L Breasts: soft, no masses palp, no axillary adenopathy palp, B/L breasts are extremely large, dense heavy, pendulous, grade III ptosis present, B/L shoulder grooving present, +reverse cervical lordosis present.       A total of 60 mins was spent on the encounter, including reviewing the patient's records, documentation, and time spent in counseling coordination of care which represent greater than 50% of the visit. 35 mins was spent in counseling and discussion of surgical procedures.

## 2024-01-25 ENCOUNTER — TELEPHONE (OUTPATIENT)
Dept: PLASTIC SURGERY | Facility: CLINIC | Age: 29
End: 2024-01-25

## 2024-01-25 NOTE — TELEPHONE ENCOUNTER
Submitted clinicals and photos to [facility] for breast reduction surgery.

## 2024-01-30 ENCOUNTER — PREP FOR PROCEDURE (OUTPATIENT)
Dept: PLASTIC SURGERY | Facility: CLINIC | Age: 29
End: 2024-01-30

## 2024-01-30 DIAGNOSIS — M54.9 DORSALGIA: ICD-10-CM

## 2024-01-30 DIAGNOSIS — N62 MACROMASTIA: Primary | ICD-10-CM

## 2024-02-21 PROBLEM — Z01.419 ENCOUNTER FOR ANNUAL ROUTINE GYNECOLOGICAL EXAMINATION: Status: RESOLVED | Noted: 2023-07-12 | Resolved: 2024-02-21

## 2024-03-19 ENCOUNTER — OFFICE VISIT (OUTPATIENT)
Dept: PLASTIC SURGERY | Facility: CLINIC | Age: 29
End: 2024-03-19
Payer: COMMERCIAL

## 2024-03-19 VITALS
TEMPERATURE: 97.1 F | WEIGHT: 191 LBS | BODY MASS INDEX: 30.7 KG/M2 | HEART RATE: 54 BPM | DIASTOLIC BLOOD PRESSURE: 76 MMHG | HEIGHT: 66 IN | SYSTOLIC BLOOD PRESSURE: 119 MMHG

## 2024-03-19 DIAGNOSIS — N62 LARGE BREASTS: Primary | ICD-10-CM

## 2024-03-19 PROCEDURE — 99214 OFFICE O/P EST MOD 30 MIN: CPT | Performed by: PLASTIC SURGERY

## 2024-03-19 RX ORDER — SENNOSIDES 8.6 MG
17.2 TABLET ORAL
Qty: 30 TABLET | Refills: 1 | Status: SHIPPED | OUTPATIENT
Start: 2024-03-19

## 2024-03-19 RX ORDER — DOCUSATE SODIUM 100 MG/1
100 CAPSULE, LIQUID FILLED ORAL 3 TIMES DAILY PRN
Qty: 40 CAPSULE | Refills: 1 | Status: SHIPPED | OUTPATIENT
Start: 2024-03-19

## 2024-03-19 RX ORDER — ACETAMINOPHEN 325 MG/1
975 TABLET ORAL EVERY 6 HOURS PRN
Qty: 168 TABLET | Refills: 0 | Status: SHIPPED | OUTPATIENT
Start: 2024-03-19 | End: 2024-04-02

## 2024-03-19 RX ORDER — OXYCODONE HYDROCHLORIDE 5 MG/1
5 TABLET ORAL EVERY 6 HOURS PRN
Qty: 12 TABLET | Refills: 0 | Status: SHIPPED | OUTPATIENT
Start: 2024-03-19

## 2024-03-19 RX ORDER — CHLORHEXIDINE GLUCONATE ORAL RINSE 1.2 MG/ML
15 SOLUTION DENTAL 2 TIMES DAILY
Qty: 120 ML | Refills: 1 | Status: SHIPPED | OUTPATIENT
Start: 2024-03-19 | End: 2024-03-24

## 2024-03-19 RX ORDER — GABAPENTIN 300 MG/1
300 CAPSULE ORAL 3 TIMES DAILY PRN
Qty: 42 CAPSULE | Refills: 0 | Status: SHIPPED | OUTPATIENT
Start: 2024-03-19 | End: 2024-04-02

## 2024-03-19 RX ORDER — CYCLOBENZAPRINE HCL 10 MG
10 TABLET ORAL 3 TIMES DAILY PRN
Qty: 42 TABLET | Refills: 0 | Status: SHIPPED | OUTPATIENT
Start: 2024-03-19 | End: 2024-04-02

## 2024-03-19 RX ORDER — ONDANSETRON 4 MG/1
4 TABLET, ORALLY DISINTEGRATING ORAL EVERY 6 HOURS PRN
Qty: 20 TABLET | Refills: 0 | Status: SHIPPED | OUTPATIENT
Start: 2024-03-19

## 2024-03-19 NOTE — PROGRESS NOTES
Plastic Surgery H&P  [facility] Plastic  And Reconstructive Surgery   [address] [place] [id number]        Assessment/Plan:        Assessment:  1. Bilateral Breast hypertrophy with associated chronic back, neck, shoulder pain      Plan:  Patient presents for pre-op visit for B/L breast reduction. Patient has symptomatic bilateral breast hypertrophy with associated chronic back, neck, shoulder pain.  She has shoulder grooving present with grade 3 ptosis.  Therefore, due to the chronic symptomatic nature, she is an appropriate candidate for breast reduction surgery.  We had a lengthy discussion regarding surgical breast reduction surgery.  I anticipate approximately  650-750g will be removed from each side.  The procedure was discussed and described in detail including operative and recovery time.  The procedure and its associated risks are as follows, but not limited to:  Infection, bleeding, scarring, poor cosmesis, asymmetry, inability to breastfeed, nipple necrosis, skin necrosis, delayed healing, chronic open wound, injury to nerves, blood vessels, or adjacent structures, permanent numbness or hypersensitivity, loss of function, need for additional procedures.  The patient noted her understanding.  She had opportunity for questions.  All were answered.  She desires to proceed.  Informed consent obtained. Pre/Post op instructions discussed. She remains an appropriate candidate for surgery. Surgical risk factors: obesity.  She is welcome to call or return with any additional questions or concerns.     Subjective       Rosa Maria Causey is a 28 y.o. female who present for pre-op evaluation for breast reduction.  Patient states he has always been large breasted. It runs in her family. She wears a bra size 38 G. She states that since having her last child one year ago, she has been dealing with increasing upper back, neck, shoulder pain.  She states her breasts are extremely heavy and she was a supportive  underwire bra to attempt supportive breast.   She also suffers from fungal rashes.  She feels the weight of her breasts with skin to skin contact is likely exacerbating this.  She takes naprosyn as needed for the back and neck pain as prescribed by her PCP.  It only provides temporary relief. She has completed over 3 months of PT with minimal relief.  She is tired of the constant pain.  She is interested in surgical treatment.  She presents today for evaluation.  She denies any personal or family history of breast cancer.  She has not had a mammogram yet.  She has no other breast related complaints.     Medical History        Past Medical History:   Diagnosis Date   • 40 weeks gestation of pregnancy 2023   • Abnormal glucose affecting pregnancy 2022     Normal repeat 3 hr.    • Anemia     • Anemia affecting pregnancy in third trimester 10/18/2022     11/18/22- hgb9.0/Hct28.9 IV iron transfusions ordered   • COVID-19 vaccine series declined 2023   • Sterilization consult 2023   •  (spontaneous vaginal delivery) 3/6/2023   • Varicella       had vaccines         Surgical History         Past Surgical History:   Procedure Laterality Date   • INDUCED          [facility]   • OR LAPAROSCOPY W/RMVL ADNEXAL STRUCTURES Bilateral 2023     Procedure: LAPAROSCOPIC BILATERAL SALPINGECTOMY;  Surgeon: Gaudencio Rubi MD;  [facility];  Service: Gynecology            Current Outpatient Medications:   •  acetaminophen (TYLENOL) 325 mg tablet, Take 2 tablets (650 mg total) by mouth every 6 (six) hours as needed for headaches, fever or moderate pain, Disp: 30 tablet, Rfl: 0  •  cyclobenzaprine (FLEXERIL) 5 mg tablet, Take 1 tablet (5 mg total) by mouth 2 (two) times a day (Patient not taking: Reported on 2024), Disp: 30 tablet, Rfl: 0  •  naproxen (NAPROSYN) 500 mg tablet, TAKE 1 TABLET(500 MG) BY MOUTH TWICE DAILY WITH MEALS (Patient not taking: Reported on  "1/12/2024), Disp: 60 tablet, Rfl: 0  •  Prenatal Vit-Fe Fumarate-FA (PRENATAL VITAMIN PO), Take by mouth in the morning (Patient not taking: Reported on 1/12/2024), Disp: , Rfl:      No Known Allergies     Family History         Family History   Problem Relation Age of Onset   • No Known Problems Mother     • Heart disease Father     • No Known Problems Sister     • No Known Problems Brother     • Diabetes Maternal Grandmother     • No Known Problems Maternal Grandfather     • No Known Problems Paternal Grandmother     • No Known Problems Paternal Grandfather     • Breast cancer Neg Hx     • Colon cancer Neg Hx     • Ovarian cancer Neg Hx              Social History               Socioeconomic History   • Marital status: /Civil Union       Spouse name: Not on file   • Number of children: Not on file   • Years of education: Not on file   • Highest education level: Not on file   Occupational History   • Not on file   Tobacco Use   • Smoking status: Never   • Smokeless tobacco: Never   Vaping Use   • Vaping status: Former   Substance and Sexual Activity   • Alcohol use: Not Currently   • Drug use: Yes       Types: Marijuana       Comment: ocassionally to help with anxiety   • Sexual activity: Yes       Partners: Male       Birth control/protection: None, Female Sterilization   Other Topics Concern   • Not on file   Social History Narrative   • Not on file      Social Determinants of Health      Financial Resource Strain: Not on file   Food Insecurity: Not on file   Transportation Needs: Not on file   Physical Activity: Not on file   Stress: Not on file   Social Connections: Not on file   Intimate Partner Violence: Not on file   Housing Stability: Not on file            Review of Systems  Pertinent items are noted in HPI.      Objective   /76 (BP Location: Right arm, Patient Position: Sitting, Cuff Size: Standard)   Pulse (!) 54   Temp (!) 97.1 °F (36.2 °C) (Tympanic)   Ht 5' 6\" (1.676 m)   Wt 86.6 kg " (191 lb)   BMI 30.83 kg/m²           General:  alert and oriented, in no acute distress   Skin:  normal and no rash or abnormalities   Eyes: conjunctivae/corneas clear. PERRL, EOM's intact. Fundi benign.   Mouth: MMM no lesions   Lymph Nodes:  Cervical, supraclavicular, and axillary nodes normal.   Lungs:  clear to auscultation bilaterally   Heart:  regular rate and rhythm, S1, S2 normal, no murmur, click, rub or gallop and regular rate and rhythm   Abdomen: soft, non-tender; bowel sounds normal; no masses,  no organomegaly   CVA:  absent   Genitourinary: defer exam   Extremities:  extremities normal, warm and well-perfused; no cyanosis, clubbing, or edema   Neurologic:  Alert and oriented x3. Gait normal. Reflexes and motor strength normal and symmetric. Cranial nerves 2-12 and sensation grossly intact.   Psychiatric:  normal mood, behavior, speech, dress, and thought processes   B/L Breasts: soft, no masses palp, no axillary adenopathy palp, B/L breasts are extremely large, dense heavy, pendulous, grade III ptosis present, B/L shoulder grooving present, +reverse cervical lordosis present.         A total of 30 mins was spent on the encounter, including reviewing the patient's records, documentation, and time spent in counseling coordination of care which represent greater than 50% of the visit. 20 mins was spent in counseling and discussion of surgical procedures.

## 2024-04-06 ENCOUNTER — APPOINTMENT (OUTPATIENT)
Dept: LAB | Facility: HOSPITAL | Age: 29
End: 2024-04-06
Payer: COMMERCIAL

## 2024-04-06 DIAGNOSIS — M54.9 DORSALGIA: ICD-10-CM

## 2024-04-06 DIAGNOSIS — N62 MACROMASTIA: ICD-10-CM

## 2024-04-06 LAB
ANION GAP SERPL CALCULATED.3IONS-SCNC: 8 MMOL/L (ref 4–13)
BASOPHILS # BLD AUTO: 0.05 THOUSANDS/ÂΜL (ref 0–0.1)
BASOPHILS NFR BLD AUTO: 1 % (ref 0–1)
BUN SERPL-MCNC: 14 MG/DL (ref 5–25)
CALCIUM SERPL-MCNC: 9.7 MG/DL (ref 8.4–10.2)
CHLORIDE SERPL-SCNC: 105 MMOL/L (ref 96–108)
CO2 SERPL-SCNC: 27 MMOL/L (ref 21–32)
CREAT SERPL-MCNC: 0.74 MG/DL (ref 0.6–1.3)
EOSINOPHIL # BLD AUTO: 0.32 THOUSAND/ÂΜL (ref 0–0.61)
EOSINOPHIL NFR BLD AUTO: 4 % (ref 0–6)
ERYTHROCYTE [DISTWIDTH] IN BLOOD BY AUTOMATED COUNT: 13.7 % (ref 11.6–15.1)
GFR SERPL CREATININE-BSD FRML MDRD: 109 ML/MIN/1.73SQ M
GLUCOSE P FAST SERPL-MCNC: 95 MG/DL (ref 65–99)
HCT VFR BLD AUTO: 37.3 % (ref 34.8–46.1)
HGB BLD-MCNC: 12 G/DL (ref 11.5–15.4)
IMM GRANULOCYTES # BLD AUTO: 0.02 THOUSAND/UL (ref 0–0.2)
IMM GRANULOCYTES NFR BLD AUTO: 0 % (ref 0–2)
LYMPHOCYTES # BLD AUTO: 3.21 THOUSANDS/ÂΜL (ref 0.6–4.47)
LYMPHOCYTES NFR BLD AUTO: 36 % (ref 14–44)
MCH RBC QN AUTO: 29 PG (ref 26.8–34.3)
MCHC RBC AUTO-ENTMCNC: 32.2 G/DL (ref 31.4–37.4)
MCV RBC AUTO: 90 FL (ref 82–98)
MONOCYTES # BLD AUTO: 0.73 THOUSAND/ÂΜL (ref 0.17–1.22)
MONOCYTES NFR BLD AUTO: 8 % (ref 4–12)
NEUTROPHILS # BLD AUTO: 4.68 THOUSANDS/ÂΜL (ref 1.85–7.62)
NEUTS SEG NFR BLD AUTO: 51 % (ref 43–75)
NRBC BLD AUTO-RTO: 0 /100 WBCS
PLATELET # BLD AUTO: 283 THOUSANDS/UL (ref 149–390)
PMV BLD AUTO: 10.7 FL (ref 8.9–12.7)
POTASSIUM SERPL-SCNC: 4 MMOL/L (ref 3.5–5.3)
RBC # BLD AUTO: 4.14 MILLION/UL (ref 3.81–5.12)
SODIUM SERPL-SCNC: 140 MMOL/L (ref 135–147)
WBC # BLD AUTO: 9.01 THOUSAND/UL (ref 4.31–10.16)

## 2024-04-06 PROCEDURE — 85025 COMPLETE CBC W/AUTO DIFF WBC: CPT

## 2024-04-06 PROCEDURE — 36415 COLL VENOUS BLD VENIPUNCTURE: CPT

## 2024-04-06 PROCEDURE — 80048 BASIC METABOLIC PNL TOTAL CA: CPT

## 2024-04-08 NOTE — PRE-PROCEDURE INSTRUCTIONS
No outpatient medications have been marked as taking for the 4/17/24 encounter (Hospital Encounter).     Spoke with pt via phone.    Medication instructions for day surgery reviewed. Please use only a sip of water to take your instructed medications. Avoid all over the counter vitamins, supplements and NSAIDS for one week prior to surgery per anesthesia guidelines. Tylenol is ok to take as needed.     You will receive a call one business day prior to surgery with an arrival time and hospital directions. If your surgery is scheduled on a Monday, the hospital will be calling you on the Friday prior to your surgery. If you have not heard from anyone by 8pm, please call the hospital supervisor through the hospital  at [phone]. ([place] [phone] or [place] [phone]).    Do not eat or drink anything after midnight the night before your surgery, including candy, mints, lifesavers, or chewing gum. Do not drink alcohol 24hrs before your surgery. Try not to smoke at least 24hrs before your surgery.       Follow the pre surgery showering instructions as listed in the “My Surgical Experience Booklet” or otherwise provided by your surgeon's office. Do not use a blade to shave the surgical area 1 week before surgery. It is okay to use a clean electric clippers up to 24 hours before surgery. Do not apply any lotions, creams, including makeup, cologne, deodorant, or perfumes after showering on the day of your surgery. Do not use dry shampoo, hair spray, hair gel, or any type of hair products.     No contact lenses, eye make-up, or artificial eyelashes. Remove nail polish, including gel polish, and any artificial, gel, or acrylic nails if possible. Remove all jewelry including rings and body piercing jewelry.     Wear causal clothing that is easy to take on and off. Consider your type of surgery.    Keep any valuables, jewelry, piercings at home. Please bring any specially ordered equipment (sling, braces) if  indicated.    Arrange for a responsible person to drive you to and from the hospital on the day of your surgery. Please confirm the visitor policy for the day of your procedure when you receive your phone call with an arrival time.     Call the surgeon's office with any new illnesses, exposures, or additional questions prior to surgery.    Please reference your “My Surgical Experience Booklet” for additional information to prepare for your upcoming surgery.

## 2024-04-17 ENCOUNTER — ANESTHESIA EVENT (OUTPATIENT)
Dept: PERIOP | Facility: HOSPITAL | Age: 29
End: 2024-04-17
Payer: COMMERCIAL

## 2024-04-17 ENCOUNTER — ANESTHESIA (OUTPATIENT)
Dept: PERIOP | Facility: HOSPITAL | Age: 29
End: 2024-04-17
Payer: COMMERCIAL

## 2024-04-17 ENCOUNTER — HOSPITAL ENCOUNTER (OUTPATIENT)
Facility: HOSPITAL | Age: 29
Setting detail: OUTPATIENT SURGERY
Discharge: HOME | End: 2024-04-17
Attending: PLASTIC SURGERY | Admitting: PLASTIC SURGERY
Payer: COMMERCIAL

## 2024-04-17 VITALS
BODY MASS INDEX: 29.73 KG/M2 | DIASTOLIC BLOOD PRESSURE: 75 MMHG | SYSTOLIC BLOOD PRESSURE: 132 MMHG | RESPIRATION RATE: 17 BRPM | HEART RATE: 76 BPM | TEMPERATURE: 98 F | WEIGHT: 185 LBS | OXYGEN SATURATION: 97 % | HEIGHT: 66 IN

## 2024-04-17 DIAGNOSIS — N62 MACROMASTIA: ICD-10-CM

## 2024-04-17 DIAGNOSIS — M54.9 DORSALGIA: ICD-10-CM

## 2024-04-17 PROBLEM — Z98.890 S/P BILATERAL BREAST REDUCTION: Status: ACTIVE | Noted: 2024-04-17

## 2024-04-17 LAB
EXT PREGNANCY TEST URINE: NEGATIVE
EXT. CONTROL: NORMAL

## 2024-04-17 PROCEDURE — 88305 TISSUE EXAM BY PATHOLOGIST: CPT | Performed by: STUDENT IN AN ORGANIZED HEALTH CARE EDUCATION/TRAINING PROGRAM

## 2024-04-17 PROCEDURE — 81025 URINE PREGNANCY TEST: CPT

## 2024-04-17 PROCEDURE — NC001 PR NO CHARGE: Performed by: PLASTIC SURGERY

## 2024-04-17 PROCEDURE — C9290 INJ, BUPIVACAINE LIPOSOME: HCPCS

## 2024-04-17 PROCEDURE — 19318 BREAST REDUCTION: CPT

## 2024-04-17 PROCEDURE — 19318 BREAST REDUCTION: CPT | Performed by: PLASTIC SURGERY

## 2024-04-17 RX ORDER — KETOROLAC TROMETHAMINE 30 MG/ML
INJECTION, SOLUTION INTRAMUSCULAR; INTRAVENOUS AS NEEDED
Status: DISCONTINUED | OUTPATIENT
Start: 2024-04-17 | End: 2024-04-17

## 2024-04-17 RX ORDER — PROPOFOL 10 MG/ML
INJECTION, EMULSION INTRAVENOUS CONTINUOUS PRN
Status: DISCONTINUED | OUTPATIENT
Start: 2024-04-17 | End: 2024-04-17

## 2024-04-17 RX ORDER — ACETAMINOPHEN 325 MG/1
975 TABLET ORAL ONCE
Status: COMPLETED | OUTPATIENT
Start: 2024-04-17 | End: 2024-04-17

## 2024-04-17 RX ORDER — ACETAMINOPHEN 325 MG/1
975 TABLET ORAL EVERY 6 HOURS PRN
Status: DISCONTINUED | OUTPATIENT
Start: 2024-04-17 | End: 2024-04-17 | Stop reason: HOSPADM

## 2024-04-17 RX ORDER — HYDROMORPHONE HCL/PF 1 MG/ML
0.5 SYRINGE (ML) INJECTION
Status: DISCONTINUED | OUTPATIENT
Start: 2024-04-17 | End: 2024-04-17 | Stop reason: HOSPADM

## 2024-04-17 RX ORDER — CEFAZOLIN SODIUM 2 G/50ML
2000 SOLUTION INTRAVENOUS ONCE
Status: COMPLETED | OUTPATIENT
Start: 2024-04-17 | End: 2024-04-17

## 2024-04-17 RX ORDER — LIDOCAINE HYDROCHLORIDE 10 MG/ML
0.5 INJECTION, SOLUTION EPIDURAL; INFILTRATION; INTRACAUDAL; PERINEURAL ONCE AS NEEDED
Status: DISCONTINUED | OUTPATIENT
Start: 2024-04-17 | End: 2024-04-17 | Stop reason: HOSPADM

## 2024-04-17 RX ORDER — SODIUM CHLORIDE, SODIUM LACTATE, POTASSIUM CHLORIDE, CALCIUM CHLORIDE 600; 310; 30; 20 MG/100ML; MG/100ML; MG/100ML; MG/100ML
INJECTION, SOLUTION INTRAVENOUS CONTINUOUS PRN
Status: DISCONTINUED | OUTPATIENT
Start: 2024-04-17 | End: 2024-04-17

## 2024-04-17 RX ORDER — ONDANSETRON 2 MG/ML
INJECTION INTRAMUSCULAR; INTRAVENOUS AS NEEDED
Status: DISCONTINUED | OUTPATIENT
Start: 2024-04-17 | End: 2024-04-17

## 2024-04-17 RX ORDER — GLYCOPYRROLATE 0.2 MG/ML
INJECTION INTRAMUSCULAR; INTRAVENOUS AS NEEDED
Status: DISCONTINUED | OUTPATIENT
Start: 2024-04-17 | End: 2024-04-17

## 2024-04-17 RX ORDER — FENTANYL CITRATE/PF 50 MCG/ML
50 SYRINGE (ML) INJECTION
Status: DISCONTINUED | OUTPATIENT
Start: 2024-04-17 | End: 2024-04-17 | Stop reason: HOSPADM

## 2024-04-17 RX ORDER — DEXAMETHASONE SODIUM PHOSPHATE 10 MG/ML
INJECTION, SOLUTION INTRAMUSCULAR; INTRAVENOUS AS NEEDED
Status: DISCONTINUED | OUTPATIENT
Start: 2024-04-17 | End: 2024-04-17

## 2024-04-17 RX ORDER — SODIUM CHLORIDE 9 MG/ML
INJECTION, SOLUTION INTRAVENOUS CONTINUOUS PRN
Status: DISCONTINUED | OUTPATIENT
Start: 2024-04-17 | End: 2024-04-17

## 2024-04-17 RX ORDER — OXYCODONE HYDROCHLORIDE 5 MG/1
5 TABLET ORAL EVERY 4 HOURS PRN
Status: DISCONTINUED | OUTPATIENT
Start: 2024-04-17 | End: 2024-04-17 | Stop reason: HOSPADM

## 2024-04-17 RX ORDER — FENTANYL CITRATE 50 UG/ML
INJECTION, SOLUTION INTRAMUSCULAR; INTRAVENOUS AS NEEDED
Status: DISCONTINUED | OUTPATIENT
Start: 2024-04-17 | End: 2024-04-17

## 2024-04-17 RX ORDER — LIDOCAINE HYDROCHLORIDE 10 MG/ML
INJECTION, SOLUTION EPIDURAL; INFILTRATION; INTRACAUDAL; PERINEURAL AS NEEDED
Status: DISCONTINUED | OUTPATIENT
Start: 2024-04-17 | End: 2024-04-17

## 2024-04-17 RX ORDER — GABAPENTIN 100 MG/1
100 CAPSULE ORAL ONCE
Status: COMPLETED | OUTPATIENT
Start: 2024-04-17 | End: 2024-04-17

## 2024-04-17 RX ORDER — METOCLOPRAMIDE HYDROCHLORIDE 5 MG/ML
INJECTION INTRAMUSCULAR; INTRAVENOUS AS NEEDED
Status: DISCONTINUED | OUTPATIENT
Start: 2024-04-17 | End: 2024-04-17

## 2024-04-17 RX ORDER — EPHEDRINE SULFATE 50 MG/ML
INJECTION INTRAVENOUS AS NEEDED
Status: DISCONTINUED | OUTPATIENT
Start: 2024-04-17 | End: 2024-04-17

## 2024-04-17 RX ORDER — MIDAZOLAM HYDROCHLORIDE 2 MG/2ML
INJECTION, SOLUTION INTRAMUSCULAR; INTRAVENOUS AS NEEDED
Status: DISCONTINUED | OUTPATIENT
Start: 2024-04-17 | End: 2024-04-17

## 2024-04-17 RX ORDER — ONDANSETRON 2 MG/ML
4 INJECTION INTRAMUSCULAR; INTRAVENOUS ONCE AS NEEDED
Status: DISCONTINUED | OUTPATIENT
Start: 2024-04-17 | End: 2024-04-17 | Stop reason: HOSPADM

## 2024-04-17 RX ORDER — HYDROMORPHONE HCL/PF 1 MG/ML
SYRINGE (ML) INJECTION AS NEEDED
Status: DISCONTINUED | OUTPATIENT
Start: 2024-04-17 | End: 2024-04-17

## 2024-04-17 RX ORDER — BUPIVACAINE HYDROCHLORIDE 2.5 MG/ML
INJECTION, SOLUTION EPIDURAL; INFILTRATION; INTRACAUDAL AS NEEDED
Status: DISCONTINUED | OUTPATIENT
Start: 2024-04-17 | End: 2024-04-17 | Stop reason: HOSPADM

## 2024-04-17 RX ORDER — ROCURONIUM BROMIDE 10 MG/ML
INJECTION, SOLUTION INTRAVENOUS AS NEEDED
Status: DISCONTINUED | OUTPATIENT
Start: 2024-04-17 | End: 2024-04-17

## 2024-04-17 RX ADMIN — PROPOFOL 200 MG: 10 INJECTION, EMULSION INTRAVENOUS at 08:39

## 2024-04-17 RX ADMIN — SODIUM CHLORIDE, SODIUM LACTATE, POTASSIUM CHLORIDE, AND CALCIUM CHLORIDE: .6; .31; .03; .02 INJECTION, SOLUTION INTRAVENOUS at 08:30

## 2024-04-17 RX ADMIN — CEFAZOLIN SODIUM 2000 MG: 2 SOLUTION INTRAVENOUS at 12:56

## 2024-04-17 RX ADMIN — ONDANSETRON 4 MG: 2 INJECTION INTRAMUSCULAR; INTRAVENOUS at 13:11

## 2024-04-17 RX ADMIN — SUGAMMADEX 200 MG: 100 INJECTION, SOLUTION INTRAVENOUS at 13:29

## 2024-04-17 RX ADMIN — KETOROLAC TROMETHAMINE 30 MG: 30 INJECTION, SOLUTION INTRAMUSCULAR; INTRAVENOUS at 13:18

## 2024-04-17 RX ADMIN — CEFAZOLIN SODIUM 2000 MG: 2 SOLUTION INTRAVENOUS at 08:58

## 2024-04-17 RX ADMIN — ROCURONIUM BROMIDE 20 MG: 10 INJECTION, SOLUTION INTRAVENOUS at 10:12

## 2024-04-17 RX ADMIN — ROCURONIUM BROMIDE 50 MG: 10 INJECTION, SOLUTION INTRAVENOUS at 08:39

## 2024-04-17 RX ADMIN — ROCURONIUM BROMIDE 10 MG: 10 INJECTION, SOLUTION INTRAVENOUS at 13:04

## 2024-04-17 RX ADMIN — GLYCOPYRROLATE 0.1 MG: 0.2 INJECTION, SOLUTION INTRAMUSCULAR; INTRAVENOUS at 10:34

## 2024-04-17 RX ADMIN — ROCURONIUM BROMIDE 10 MG: 10 INJECTION, SOLUTION INTRAVENOUS at 11:21

## 2024-04-17 RX ADMIN — FENTANYL CITRATE 50 MCG: 50 INJECTION INTRAMUSCULAR; INTRAVENOUS at 09:50

## 2024-04-17 RX ADMIN — FENTANYL CITRATE 50 MCG: 50 INJECTION INTRAMUSCULAR; INTRAVENOUS at 11:11

## 2024-04-17 RX ADMIN — FENTANYL CITRATE 50 MCG: 50 INJECTION INTRAMUSCULAR; INTRAVENOUS at 11:35

## 2024-04-17 RX ADMIN — ROCURONIUM BROMIDE 20 MG: 10 INJECTION, SOLUTION INTRAVENOUS at 09:12

## 2024-04-17 RX ADMIN — SODIUM CHLORIDE: 0.9 INJECTION, SOLUTION INTRAVENOUS at 08:45

## 2024-04-17 RX ADMIN — MIDAZOLAM 2 MG: 1 INJECTION INTRAMUSCULAR; INTRAVENOUS at 08:39

## 2024-04-17 RX ADMIN — METOCLOPRAMIDE 10 MG: 5 INJECTION, SOLUTION INTRAMUSCULAR; INTRAVENOUS at 10:37

## 2024-04-17 RX ADMIN — ACETAMINOPHEN 975 MG: 325 TABLET, FILM COATED ORAL at 07:38

## 2024-04-17 RX ADMIN — FENTANYL CITRATE 50 MCG: 50 INJECTION INTRAMUSCULAR; INTRAVENOUS at 08:39

## 2024-04-17 RX ADMIN — FENTANYL CITRATE 50 MCG: 50 INJECTION INTRAMUSCULAR; INTRAVENOUS at 09:51

## 2024-04-17 RX ADMIN — HYDROMORPHONE HYDROCHLORIDE 0.5 MG: 1 INJECTION, SOLUTION INTRAMUSCULAR; INTRAVENOUS; SUBCUTANEOUS at 09:18

## 2024-04-17 RX ADMIN — ROCURONIUM BROMIDE 20 MG: 10 INJECTION, SOLUTION INTRAVENOUS at 11:37

## 2024-04-17 RX ADMIN — LIDOCAINE HYDROCHLORIDE 50 MG: 10 INJECTION, SOLUTION EPIDURAL; INFILTRATION; INTRACAUDAL; PERINEURAL at 08:39

## 2024-04-17 RX ADMIN — DEXAMETHASONE SODIUM PHOSPHATE 10 MG: 10 INJECTION, SOLUTION INTRAMUSCULAR; INTRAVENOUS at 08:39

## 2024-04-17 RX ADMIN — GLYCOPYRROLATE 0.2 MG: 0.2 INJECTION, SOLUTION INTRAMUSCULAR; INTRAVENOUS at 08:39

## 2024-04-17 RX ADMIN — GABAPENTIN 100 MG: 100 CAPSULE ORAL at 07:39

## 2024-04-17 RX ADMIN — EPHEDRINE SULFATE 5 MG: 50 INJECTION, SOLUTION INTRAVENOUS at 12:03

## 2024-04-17 NOTE — DISCHARGE INSTR - AVS FIRST PAGE
Post-Op Discharge Instructions  Dr. Delores Busch  [facility]astic and Reconstructive Surgery  [address] [place] [address], [facility] [address], [place] [address]  [phone]     You may bathe in 48 hours. Do not scrub incision sites. Do not get ADILIA dressings wet.    Your dressings should stay in place until seen in the office. Please do not get dressings wet or submerge them in water until seen in the office  Keep ADILIA dressings on until seen by Dr. Busch  If the light is blinking green, the battery pack is functioning properly  If the light blinks orange, hit the orange button to re-establish the seal, this will usually correct the problem, if the light continues to blink orange, the dressing will still continue to function  Call the office if the dressing becomes completely saturated    3. Keep surgical bra on at all times, except to shower.  It is ok to remove bra while laying down at rest for short intervals for comfort  After 2 weeks, it is ok to switch to a sports bra.  It is recommended to use one that peyman in the front.  No underwire bra for 6 weeks.  Use ABD pads or a form of padding for extra compression.    4. No strenuous activity or lifting over 10 lbs for a minimum of 4 weeks. No repetitive pushing, pulling or overhead arm motions.    5. Please lay on your back, with your head at an incline.     6. Your first post-op appointment is scheduled for 4/25/2024 @ 1:30PM at the [facility].    7. Take following medications with a checkmark ([x]) as prescribed, and do not take any pain medication on an empty stomach.  [x]Tylenol 975mg, every 6 hours for pain control.  [x]Gabapentin 100mg, every 8 hours for nerve pain.  [x]Flexeril 10mg, every 8 hours for muscle spasms/pain.  [x]Oxycodone, 5mg, 1 tablet every 4 hours, as needed for severe/break-through  pain.  Please do not take any vitamins, minerals, ibuprofen, hormonal drugs or immunologic drugs for 7 days post-op.    8. Resume any prior medications at  home unless otherwise instructed by Dr. Busch    9. You must be off all pain medications and have a clear field of vision before driving.    10. For the next 24 hours:  a. Do not sign any legal documents or operate machinery.  b. Have a responsible adult help you.  c. Take it easy & rest.    11. Call Dr. Busch at the office [phone] if any:   a. Obvious bleeding, excessive swelling, or warmth at the site  b. Fever over 101.0°  c. Shortness of breath, severe calf or thigh pain.  d. Redness, odor, or pus at the wound. (Some oozing is normal from incision sites and may continue for several days)  e. Persistent vomiting or pain that is not relieved by your medication.

## 2024-04-17 NOTE — H&P
H&P reviewed. After examining the patient I find no changes in the patients condition since the H&P had been written.    Vitals:    04/17/24 0727   BP: 125/71   Pulse: 74   Resp: 20   Temp: 98.2 °F (36.8 °C)   SpO2: 98%

## 2024-04-17 NOTE — OP NOTE
OPERATIVE REPORT  PATIENT NAME: Rosa Maria Causey    :  1995  MRN: [id number]  Pt Location: BE OR ROOM 06    SURGERY DATE: 2024    Surgeons and Role:     * Delores Busch MD - Primary     * Citlali Grace PA-C    Preop Diagnosis:  Macromastia [N62]  Dorsalgia [M54.9]    Post-Op Diagnosis Codes:     * Macromastia [N62]     * Dorsalgia [M54.9]    Procedure(s):  Bilateral - BILATERAL BREAST REDUCTION    Specimen(s):  ID Type Source Tests Collected by Time Destination   1 : RIGHT BREAST TISSUE Tissue Breast, Right TISSUE EXAM Delores Busch MD 2024 10:18 AM    2 : LEFT BREAST TISSUE Tissue Breast, Left TISSUE EXAM Delores Busch MD 2024 11:52 AM        Estimated Blood Loss:   100 mL    Drains:  [REMOVED] Urethral Catheter Latex 16 Fr. (Removed)   Number of days: 0       Anesthesia Type:   General    Operative Indications:  Macromastia [N62]  Dorsalgia [M54.9]      Operative Findings:  Right breast tissue removed  Left breast tissue removed:    Complications:   None    Procedure and Technique:    Patient is a 28 year-old female who suffers from severe breast hypertrophy with associated chronic back upper back, neck, shoulder pain.  She has failed conservative management and is therefore an appropriate candidate for bilateral breast reduction surgery.  All details of the surgical procedure were discussed at length the patient including all potential risks, benefits alternatives.  Risks discussed included, but were not limited to:  Infection, bleeding, scarring, hematoma, seroma, partial total skin flap loss, delayed healing, wound separation, damage to nerves or blood vessels, permanent numbness or hypersensitivity, loss of function, asymmetry, nipple necrosis, inability to breastfeed, need for additional procedures.  The patient noted her understanding.  She had opportunity for questions.  All were answered.  She desired to proceed.  Informed consent was obtained.    1. Bilateral breast  reduction  2. Bilateral pectoralis 1 and 2 blocks with Exparel  3. Application of ADILIA NPWT dressing, 400 centimeters squared, non DME.      Patient was identified in preoperative holding area and preoperative markings were made.  Patient was then transferred operating room placed supine on table.  Venodyne boots were placed and activated all pressure points were appropriately and successfully padded.  After induction of general endotracheal anesthesia, the entire anterior chest wall was sterilely prepped and draped with ChloraPrep in usual fashion.  Time-out was called the procedure was verified.  Next, attention focused on the right breast where the nipple-areolar complex was sized at 42 mm with a cookie cutter.  A 10 cm pedicle was then de-epithelialized along the preoperative markings.  Incision was then made with along superior markings with a 10 blade scalpel and taken down to skin subcu tissue using electrocautery.  [place] retractors were then placed and then medial, central, lateral skin flaps were then raised using electrocautery down to the chest wall.  An inferiorly based central pedicle was then developed by excising all excess breast tissue superiorly medially and laterally.  The breast skin and tissue was removed and sent to pathology as a specimen.  The right breast tissue removed weighed approximately 395 g. The right breast was then copiously irrigated normal saline hemostasis obtained electrocautery.  A right-sided pectoralis 1 and 2 block was then performed with Exparel 20 cc diluted with Marcaine 0.25% 40 cc.  A total of 30 cc was injected along the lateral border the pectoralis major and minor muscles and along the lateral border of serratus.  Next a #15 [place] Vlad drain was placed and brought out laterally through a stab incision.  It was secured in place with a 2 0 silk suture ligature.  Next, the pedicle was then imbricated with 2-0 Vicryl sutures to shorten the length and bring the nipple  into the appropriate position.  The pedicle was then anchored medially to the chest wall with a 2-0 Vicryl figure-of-eight suture to create projection.  Next the skin flaps were then redraped over the pedicle and the vertical and transverse incisions were then tailor tacked closed with skin staples.    The identical procedure was then performed on the left side.  The left breast tissue removed weighed approximately 345g. The patient was then placed into the seated position to assess for size and symmetry.  Good symmetry was obtained.  The nipple-areolar complex was then marked out with a marking pen.  Patient was then placed back into the supine position.  The right and left breast were then closed in identical fashion.  The vertical and transverse incisions were closed in layered fashion.  First a 3-0 Monocryl Stratafix was used to close the deep dermis and a running fashion.  The skin was then closed in running fashion using a 3-0 Monocryl Stratafix in a running subcuticular closure.  The nipple-areolar complex was then brought out and sutured in place in 8 quadrants with 3-0 Monocryl in a buried deep dermal fashion.  The skin of the nipple areolar complex was then reapproximated to the breast skin using a 5-0 Monocryl in running fashion.  The bilateral breasts were then cleansed and dried.  Next a akira 10 x 20 cm negative pressure wound therapy dressing was then placed over the lower midline of the breast.  The adhesive dressing was applied and the akira was placed on negative pressure 125 mmHg and a good seal was obtained.  ABD pads and a surgical bra were placed.  The patient tolerated the procedure well.  There were no complications.   A qualified resident was not available for the case.   I was present for the entire procedure   A physician assistant was required during the procedure for retraction, tissue handling, dissection and suturing.    Patient Disposition:  PACU         SIGNATURE: Delores Busch,  MD  DATE: April 17, 2024  TIME: 1:48 PM

## 2024-04-17 NOTE — ANESTHESIA PREPROCEDURE EVALUATION
Procedure:  BILATERAL BREAST REDUCTION (Bilateral: Breast)    Relevant Problems   CARDIO   (+) Acute midline thoracic back pain      MUSCULOSKELETAL   (+) Acute midline thoracic back pain        Physical Exam    Airway    Mallampati score: I  TM Distance: >3 FB  Neck ROM: full     Dental   No notable dental hx     Cardiovascular  Rhythm: regular, Rate: normal    Pulmonary   Breath sounds clear to auscultation    Other Findings  post-pubertal.      Anesthesia Plan  ASA Score- 2     Anesthesia Type- general with ASA Monitors.         Additional Monitors:     Airway Plan: ETT.           Plan Factors-Exercise tolerance (METS): >4 METS.    Chart reviewed. EKG reviewed.  Existing labs reviewed. Patient summary reviewed.    Patient is not a current smoker.      Obstructive sleep apnea risk education given perioperatively.        Induction- intravenous.    Postoperative Plan- Plan for postoperative opioid use.     Informed Consent- Anesthetic plan and risks discussed with patient.  I personally reviewed this patient with the CRNA. Discussed and agreed on the Anesthesia Plan with the CRNA..

## 2024-04-18 ENCOUNTER — TELEPHONE (OUTPATIENT)
Age: 29
End: 2024-04-18

## 2024-04-18 NOTE — TELEPHONE ENCOUNTER
Called and spoke with patient letting her know that Chavez said she can take the bra off for 10-15 minutes if she is uncomfortable but she should try her best to keep it on as long as possible throughout the day

## 2024-04-18 NOTE — TELEPHONE ENCOUNTER
Patient called stating she had breast reduction surgery done yesterday she was wondering if she can remove the bra for a little or if she should wait till her post op appointment?

## 2024-04-18 NOTE — TELEPHONE ENCOUNTER
Good Morning! Per Chavez, she can take the bra off for 10-15 minutes if she is uncomfortable but she should try her best to keep in on as long as possible throughout the day. Thank you.

## 2024-04-25 ENCOUNTER — OFFICE VISIT (OUTPATIENT)
Dept: PLASTIC SURGERY | Facility: CLINIC | Age: 29
End: 2024-04-25

## 2024-04-25 DIAGNOSIS — Z98.890 S/P BILATERAL BREAST REDUCTION: Primary | ICD-10-CM

## 2024-04-25 PROCEDURE — 99024 POSTOP FOLLOW-UP VISIT: CPT

## 2024-04-25 NOTE — PROGRESS NOTES
[facility] Plastic and Reconstructive Surgery  [address], [address], [place] [address]  [phone]    Patient Identification: Rosa Maria Causey is a 28 y.o. female     History of Present Illness: The patient is a 28 y.o.  year-old female  who presents to the office for post-op visit. Patient is 8 days s/p Bilateral Breast Reduction - Bilateral  on 2024 by Dr. Busch.  Patient states she is doing well at this time. Reports some discomfort in the breasts. ADILIA in place. Wearing bra at all times and following restrictions. Sleeping on back. Seng fevers, chills, signs of infection or significant pain.  Patient has no complaints at this time.    Past Medical History:   Diagnosis Date    40 weeks gestation of pregnancy 2023    Abnormal glucose affecting pregnancy 2022    Normal repeat 3 hr.     Anemia     Anemia affecting pregnancy in third trimester 10/18/2022    11/18/22- hgb9.0/Hct28.9 IV iron transfusions ordered    COVID-19 vaccine series declined 2023    Sterilization consult 2023     (spontaneous vaginal delivery) 3/6/2023    Varicella     had vaccines          Review of Systems  Constitutional: Denies fevers, chills or pain.  Skin: Denies any warmth, erythema, edema or mucopurulent drainage.     Physical Exam    Breast: ADILIA removed. Surgical incisions are clean, dry, and intact. Skin perfusion is intact. Expected amount of post-operative edema. There are no signs of infection, obvious hematoma, seroma or wound dehiscence.    Assessment and Plan:  The patient is an 28 y.o.  year-old female who presents to the office for post-op visit. Patient is 8 days s/p Bilateral Breast Reduction - Bilateral  on 2024 by Dr. Busch    -At today's visit PICOs removed. Incisions healing well. Sutures in place.  -Continue wearing surgical compression bra at all times. Patient is to refrain from exercise/repetitive arm movements for 4-6 weeks post-op. Sleep on back.  -Apply bacitracin  daily  -The patient is to return 1 week for suture removal.  -The patient is to call the office with any questions or concerns. All of the patient's questions were answered at this time and they agree with the plan of care.      Citlali Grace PA-C  [facility] Plastic and Reconstructive Surgery

## 2024-04-26 PROCEDURE — 88305 TISSUE EXAM BY PATHOLOGIST: CPT | Performed by: STUDENT IN AN ORGANIZED HEALTH CARE EDUCATION/TRAINING PROGRAM

## 2024-05-02 ENCOUNTER — OFFICE VISIT (OUTPATIENT)
Dept: PLASTIC SURGERY | Facility: CLINIC | Age: 29
End: 2024-05-02

## 2024-05-02 DIAGNOSIS — Z98.890 S/P BILATERAL BREAST REDUCTION: Primary | ICD-10-CM

## 2024-05-02 PROCEDURE — 99024 POSTOP FOLLOW-UP VISIT: CPT

## 2024-05-02 NOTE — PROGRESS NOTES
[facility]astic [facility]  [address], [address], [place] [address]  [phone]    Patient Identification: Rosa Maria Causey is a 28 y.o. female     History of Present Illness: The patient is a 28 y.o.  year-old female  who presents to the office for post-op visit. Patient is 15 days s/p Bilateral Breast Reduction - Bilateral  on 2024 by Dr. Busch.  Patient states she is doing well at this time. Wearing bra at all times and following restrictions. Sleeping on back. Denies fevers, chills, signs of infection or significant pain. Pt is happy with results thus far.  Patient has no complaints at this time.    Past Medical History:   Diagnosis Date    40 weeks gestation of pregnancy 2023    Abnormal glucose affecting pregnancy 2022    Normal repeat 3 hr.     Anemia     Anemia affecting pregnancy in third trimester 10/18/2022    11/18/22- hgb9.0/Hct28.9 IV iron transfusions ordered    COVID-19 vaccine series declined 2023    Sterilization consult 2023     (spontaneous vaginal delivery) 3/6/2023    Varicella     had vaccines          Review of Systems  Constitutional: Denies fevers, chills or pain.  Skin: Denies any warmth, erythema, edema or mucopurulent drainage.     Physical Exam    Breast: Surgical incisions are clean, dry, and intact. Sutures removed. Skin perfusion is intact. Expected amount of post-operative edema. There are no signs of infection, obvious hematoma, seroma or wound dehiscence.    Assessment and Plan:  The patient is an 28 y.o.  year-old female who presents to the office for post-op visit. Patient is 15 days s/p Bilateral Breast Reduction - Bilateral  on 2024 by Dr. Busch    -At today's visit sutures removed. Incisions healing well.   -Continue wearing surgical compression bra at all times. Patient is to refrain from exercise/repetitive arm movements for 4-6 weeks post-op. Sleep on back.  -Apply Aquaphor daily.  -Discussed daily scar  massage and use of silicone scar gel/tape to promote scar softening and flattening. The patient was educated on scar care and that it can take up to 1 year for full scar maturation.  -The patient is to return 4 weeks for 6 week post-op visit.  -The patient is to call the office with any questions or concerns. All of the patient's questions were answered at this time and they agree with the plan of care.      Citlali Grace PA-C  [facility] Plastic and Reconstructive Surgery

## 2024-05-08 DIAGNOSIS — N62 LARGE BREASTS: ICD-10-CM

## 2024-05-09 DIAGNOSIS — N62 LARGE BREASTS: ICD-10-CM

## 2024-05-10 RX ORDER — CYCLOBENZAPRINE HCL 10 MG
10 TABLET ORAL 3 TIMES DAILY PRN
Qty: 42 TABLET | Refills: 0 | Status: SHIPPED | OUTPATIENT
Start: 2024-05-10 | End: 2024-05-24

## 2024-05-10 RX ORDER — CYCLOBENZAPRINE HCL 10 MG
TABLET ORAL
Qty: 42 TABLET | Refills: 0 | Status: SHIPPED | OUTPATIENT
Start: 2024-05-10

## 2024-05-10 NOTE — TELEPHONE ENCOUNTER
Requested medication(s) are due for refill today: Yes  Patient has already received a courtesy refill: No  Other reason request has been forwarded to provider:  Refill request completed.

## 2024-05-30 ENCOUNTER — OFFICE VISIT (OUTPATIENT)
Dept: PLASTIC SURGERY | Facility: CLINIC | Age: 29
End: 2024-05-30

## 2024-05-30 DIAGNOSIS — Z98.890 S/P BILATERAL BREAST REDUCTION: Primary | ICD-10-CM

## 2024-05-30 PROCEDURE — 99024 POSTOP FOLLOW-UP VISIT: CPT

## 2024-05-30 NOTE — PROGRESS NOTES
[facility] Plastic and Reconstructive Surgery  [address], [address], [place] [address]  [phone]    Patient Identification: Rosa Maria Causey is a 28 y.o. female     History of Present Illness: The patient is a 28 y.o.  year-old female  who presents to the office for post-op visit. Patient is 43 days s/p Bilateral Breast Reduction - Bilateral  on 2024 by Dr. Busch.  Patient states she is doing well at this time. Using Aquaphor and scar creams. Denies fevers, chills, signs of infection or significant pain. Pt is happy with results thus far.  Patient has no complaints at this time.    Past Medical History:   Diagnosis Date    40 weeks gestation of pregnancy 2023    Abnormal glucose affecting pregnancy 2022    Normal repeat 3 hr.     Anemia     Anemia affecting pregnancy in third trimester 10/18/2022    11/18/22- hgb9.0/Hct28.9 IV iron transfusions ordered    COVID-19 vaccine series declined 2023    Sterilization consult 2023     (spontaneous vaginal delivery) 3/6/2023    Varicella     had vaccines          Review of Systems  Constitutional: Denies fevers, chills or pain.  Skin: Denies any warmth, erythema, edema or mucopurulent drainage.     Physical Exam    Breast: Incisions healing well. No hypertrophy or hyperpigmentation. Breasts symmetrical in size and shape.    Assessment and Plan:  The patient is an 28 y.o.  year-old female who presents to the office for post-op visit. Patient is 43 days s/p Bilateral Breast Reduction - Bilateral  on 2024 by Dr. Busch    -Incision sites healing well. Breasts symmetrical in shape and size.   -Patient may return to daily activities utilizing her arms.   -Discussed daily scar massage and use of silicone scar gel/tape to promote scar softening and flattening. The patient was educated on scar care and that it can take up to 1 year for full scar maturation.  -The patient may return for a 3 month post-op with Dr. Busch.   -The patient  is to call the office with any questions or concerns. All of the patient's questions were answered at this time and they agree with the plan of care.      Citlali Grace PA-C  [facility]astic and Reconstructive Surgery

## 2024-07-26 ENCOUNTER — OFFICE VISIT (OUTPATIENT)
Dept: PLASTIC SURGERY | Facility: CLINIC | Age: 29
End: 2024-07-26
Payer: COMMERCIAL

## 2024-07-26 DIAGNOSIS — Z98.890 S/P BILATERAL BREAST REDUCTION: Primary | ICD-10-CM

## 2024-07-26 PROCEDURE — 99213 OFFICE O/P EST LOW 20 MIN: CPT | Performed by: PLASTIC SURGERY

## 2024-07-26 NOTE — PROGRESS NOTES
[facility] Plastic and Reconstructive Surgery  [address], [address], [place] [address]  [phone]     Patient Identification: Rosa Maria Causey is a 28 y.o. female      History of Present Illness: The patient is a 28 y.o.  year-old female  who presents to the office for post-op visit. Patient is 3 months s/p Bilateral Breast Reduction - Bilateral  on 2024.  Patient states she is doing well at this time. Using Aquaphor and scar creams. Denies fevers, chills, signs of infection or significant pain. Pt is happy with results thus far. Symptoms improves/resolved.  Patient has no complaints at this time.     Medical History        Past Medical History:   Diagnosis Date    40 weeks gestation of pregnancy 2023    Abnormal glucose affecting pregnancy 2022     Normal repeat 3 hr.     Anemia      Anemia affecting pregnancy in third trimester 10/18/2022     11/18/22- hgb9.0/Hct28.9 IV iron transfusions ordered    COVID-19 vaccine series declined 2023    Sterilization consult 2023     (spontaneous vaginal delivery) 3/6/2023    Varicella       had vaccines               Review of Systems  Constitutional: Denies fevers, chills or pain.  Skin: Denies any warmth, erythema, edema or mucopurulent drainage.      Physical Exam  Gen: AAOx3, NAD  Breast: Incisions healing well. Scars soft, no hypertrophy or hyperpigmentation. Breasts symmetrical in size and shape. NAC viable and sensate     Assessment and Plan:  The patient is an 28 y.o.  year-old female who presents to the office for post-op visit. Patient is 43 days s/p Bilateral Breast Reduction - Bilateral  on 2024      -Incisions/scars healing well. Breasts symmetrical in shape and size.   -Patient may return to daily activities and exercise.   -Discussed daily scar massage and use of silicone scar gel/tape to promote scar softening and flattening. The patient was educated on scar care and that it can take up to 1 year for full scar  maturation.  -Post op photos taken  -The patient may return for a 3 months for 6 month visit.   -The patient is to call the office with any questions or concerns. All of the patient's questions were answered at this time and they agree with the plan of care.     A total of 20 mins was spent on the encounter, including reviewing the patient's records, documentation, and time spent in counseling coordination of care which represent greater than 50% of the visit. 15 mins was spent in counseling and discussion of scar management, post op course and instructions..

## 2024-10-28 ENCOUNTER — OFFICE VISIT (OUTPATIENT)
Dept: PLASTIC SURGERY | Facility: CLINIC | Age: 29
End: 2024-10-28
Payer: COMMERCIAL

## 2024-10-28 VITALS
TEMPERATURE: 97.5 F | DIASTOLIC BLOOD PRESSURE: 88 MMHG | HEART RATE: 71 BPM | SYSTOLIC BLOOD PRESSURE: 117 MMHG | BODY MASS INDEX: 28.41 KG/M2 | WEIGHT: 176 LBS

## 2024-10-28 DIAGNOSIS — Z48.89 ENCOUNTER FOR FOLLOW-UP CARE INVOLVING PLASTIC SURGERY: Primary | ICD-10-CM

## 2024-10-28 PROCEDURE — 99213 OFFICE O/P EST LOW 20 MIN: CPT | Performed by: PHYSICIAN ASSISTANT

## 2024-10-28 NOTE — PROGRESS NOTES
[facility] Plastic and Reconstructive Surgery  [address], [address], [place] [address]  [phone]     Patient Identification: Rosa Maria Causey is a 29 y.o. female      History of Present Illness: The patient is a 29 y.o. year-old female who presents to the office for post-op visit. Patient is about 6 months s/p Bilateral Breast Reduction - Bilateral  on 2024.  Patient states she is doing well at this time. Using scar creams. Denies fevers, chills, signs of infection or significant pain. Pt is happy with results thus far. Symptoms improves/resolved.  Patient has no complaints at this time.    Past Medical History:   Diagnosis Date    40 weeks gestation of pregnancy 2023    Abnormal glucose affecting pregnancy 2022    Normal repeat 3 hr.     Anemia     Anemia affecting pregnancy in third trimester 10/18/2022    11/18/22- hgb9.0/Hct28.9 IV iron transfusions ordered    COVID-19 vaccine series declined 2023    Sterilization consult 2023     (spontaneous vaginal delivery) 3/6/2023    Varicella     had vaccines     Review of Systems  Constitutional: Denies fevers, chills or pain.  Skin: Denies any warmth, erythema, edema or mucopurulent drainage.      Physical Exam  Gen: AAOx3, NAD  Breasts: Incisions healing well. Scars soft, no hypertrophy or hyperpigmentation. Breasts symmetrical in size and shape. NAC viable and sensate     Assessment and Plan:  The patient is an 28 y.o.  year-old female who presents to the office for post-op visit. Patient is 43 days s/p Bilateral Breast Reduction - Bilateral  on 2024      -Incisions/scars healing well. Breasts symmetrical in shape and size.   -Patient may return to daily activities and exercise.   -Discussed daily scar massage and use of silicone scar gel/tape to promote scar softening and flattening. The patient was educated on scar care and that it can take up to 1 year for full scar maturation.  -Post op photos taken  -The patient  may return for a 6 months for 12 month visit.   -The patient is to call the office with any questions or concerns. All of the patient's questions were answered at this time and they agree with the plan of care.     A total of 20 mins was spent on the encounter, including reviewing the patient's records, documentation, and time spent in counseling coordination of care which represent greater than 50% of the visit. 15 mins was spent in counseling and discussion of scar management, post op course and instructions.    Shaq Mario PA-C  Plastic & Reconstructive Surgery

## 2024-11-06 ENCOUNTER — TELEPHONE (OUTPATIENT)
Dept: FAMILY MEDICINE CLINIC | Facility: CLINIC | Age: 29
End: 2024-11-06

## 2025-02-14 ENCOUNTER — TELEPHONE (OUTPATIENT)
Dept: FAMILY MEDICINE CLINIC | Facility: CLINIC | Age: 30
End: 2025-02-14

## 2025-02-14 NOTE — TELEPHONE ENCOUNTER
[place] message sent    Vital Signs Last 24 Hrs  T(C): --  T(F): --  HR: --  BP: --  BP(mean): --  RR: --  SpO2: --

## 2025-04-29 ENCOUNTER — OFFICE VISIT (OUTPATIENT)
Dept: PLASTIC SURGERY | Facility: CLINIC | Age: 30
End: 2025-04-29

## 2025-04-29 DIAGNOSIS — Z48.89 ENCOUNTER FOR FOLLOW-UP CARE INVOLVING PLASTIC SURGERY: Primary | ICD-10-CM

## 2025-04-29 NOTE — PROGRESS NOTES
[facility]  [address], [address], [place] [address]  [phone]    Patient Identification: Rosa Maria Causey is a 29 y.o. female     History of Present Illness: The patient is a 29 y.o. female  who presents to the office for follow-up visit. She is about 1 year s/p Bilateral Breast Reduction - Bilateral  on 2024 by Dr. Busch  Patient is doing well with no complaints. She is very happy with the results of her surgery and does not wish to have any revisions done at this time.           The following portions of the patient's history were reviewed: allergies, current medications, past medical history, past surgical history, and past social history    Past Medical History:   Diagnosis Date    40 weeks gestation of pregnancy 2023    Abnormal glucose affecting pregnancy 2022    Normal repeat 3 hr.     Anemia     Anemia affecting pregnancy in third trimester 10/18/2022    11/18/22- hgb9.0/Hct28.9 IV iron transfusions ordered    COVID-19 vaccine series declined 2023    Sterilization consult 2023     (spontaneous vaginal delivery) 3/6/2023    Varicella     had vaccines        Past Surgical History:   Procedure Laterality Date    INDUCED       [facility]    LA BREAST REDUCTION Bilateral 2024    Procedure: BILATERAL BREAST REDUCTION;  Surgeon: Delores Busch MD;  Location: [facility];  Service: Plastics    LA LAPAROSCOPY W/RMVL ADNEXAL STRUCTURES Bilateral 2023    Procedure: LAPAROSCOPIC BILATERAL SALPINGECTOMY;  Surgeon: Gaudencio Rubi MD;  Location: [facility];  Service: Gynecology       No Known Allergies       Current Outpatient Medications:     cyclobenzaprine (FLEXERIL) 10 mg tablet, Take 1 tablet (10 mg total) by mouth 3 (three) times a day as needed (pain or muscle spasms) for up to 14 days, Disp: 42 tablet, Rfl: 0    cyclobenzaprine (FLEXERIL) 10 mg tablet, TAKE 1 TABLET(10 MG) BY MOUTH THREE TIMES  DAILY FOR UP TO 14 DAYS AS NEEDED FOR PAIN OR MUSCLE SPASMS (Patient not taking: Reported on 10/28/2024), Disp: 42 tablet, Rfl: 0    ondansetron (ZOFRAN-ODT) 4 mg disintegrating tablet, Take 1 tablet (4 mg total) by mouth every 6 (six) hours as needed for nausea or vomiting (Patient not taking: Reported on 10/28/2024), Disp: 20 tablet, Rfl: 0    oxyCODONE (Roxicodone) 5 immediate release tablet, Take 1 tablet (5 mg total) by mouth every 6 (six) hours as needed for moderate pain for up to 12 doses Max Daily Amount: 20 mg (Patient not taking: Reported on 10/28/2024), Disp: 12 tablet, Rfl: 0      Social History     Socioeconomic History    Marital status: /Civil Union     Spouse name: Not on file    Number of children: Not on file    Years of education: Not on file    Highest education level: Not on file   Occupational History    Not on file   Tobacco Use    Smoking status: Never    Smokeless tobacco: Never   Vaping Use    Vaping status: Former   Substance and Sexual Activity    Alcohol use: Yes     Comment: social    Drug use: Not Currently     Types: Marijuana     Comment: ocassionally to help with anxiety    Sexual activity: Yes     Partners: Male     Birth control/protection: None, Female Sterilization   Other Topics Concern    Not on file   Social History Narrative    Not on file     Social Drivers of Health     Financial Resource Strain: Not on file   Food Insecurity: Not on file   Transportation Needs: Not on file   Physical Activity: Not on file   Stress: Not on file   Social Connections: Not on file   Intimate Partner Violence: Not on file   Housing Stability: Not on file          Review of Systems  Constitutional: Denies fevers, chills, nausea, vomiting, malaise, or pain.  Skin: Denies any bleeding, warmth, erythema, edema, mucopurulent drainage, or signs of infection    Physical Exam  General: pleasant and well appearing, in no acute distress.   Skin: surgical incisions C/D/I. Breast sosft and  symmetrical. No edema, obvious hematoma, or seroma. Skin and nipple perfusion intact. No tenderness to palpation, warmth, odors, discharge, drainage, or gross signs of infection. No fluctuance or palpable fluid collections. Incisions clean, dry, and intact.          Assessment and Plan:  The patient is a 29 y.o. female who presents to the office for follow-up visit. Patient is 377 days s/p Bilateral Breast Reduction - Bilateral  on 4/17/2024 by Dr. Busch.       -At today's visit, breasts and incisions were inspected - have healed well with adequate scar formation  -Patient asking about scar care - discussed with patient that usually it takes a year for scars to fully mature. Since her surgery was > 1 year ago, it is unlikely that scar creams will do much for her scarring. I still provided her with the resources and said that she can try to use them for a little to see if she notices any difference, but likely won't do much   -Patient is very happy with the results of her surgery and has no issues at this time   -She chooses to follow-up on an as needed basis but knows se could call to schedule an appointment should any issues arise   -The patient is to call the office with any questions or concerns. All of the patient's questions were answered at this time and they agree with the plan of care.    Patient seen and evaluated by myself. Together we discussed an ideal assessment and plan. All questions were answered. I have spent 10 minutes with this patient today on patient evaluation, education, and chart review. Patient to call with any questions or concerns.     Ashley Bolivar PA-C  [facility]

## (undated) DEVICE — PICO 7 SINGLE 10X20CM: Brand: PICO™ 7

## (undated) DEVICE — TUBING SMOKE EVAC W/FILTRATION DEVICE PLUMEPORT ACTIV

## (undated) DEVICE — ADHESIVE SKIN HIGH VISCOSITY EXOFIN 1ML

## (undated) DEVICE — SUT MONOCRYL 3-0 SH 27 IN Y416H

## (undated) DEVICE — BETHLEHEM UNIVERSAL BREAST PK: Brand: CARDINAL HEALTH

## (undated) DEVICE — PAD GROUNDING DUAL ADULT

## (undated) DEVICE — CHLORAPREP HI-LITE 26ML ORANGE

## (undated) DEVICE — DRAPE SURGIKIT SADDLE BAG

## (undated) DEVICE — SUT MONOCRYL 5-0 P-3 18 IN Y493G

## (undated) DEVICE — SUT MONOCRYL 4-0 PS-2 18 IN Y496G

## (undated) DEVICE — TROCAR: Brand: KII® SLEEVE

## (undated) DEVICE — INTENDED FOR TISSUE SEPARATION, AND OTHER PROCEDURES THAT REQUIRE A SHARP SURGICAL BLADE TO PUNCTURE OR CUT.: Brand: BARD-PARKER ® CARBON RIB-BACK BLADES

## (undated) DEVICE — PROXIMATE SKIN STAPLERS (35 WIDE) CONTAINS 35 STAINLESS STEEL STAPLES (FIXED HEAD): Brand: PROXIMATE

## (undated) DEVICE — SUT STRATAFIX SPIRAL MONOCRYL PLUS 3-0 PS-2 45CM SXMP1B107

## (undated) DEVICE — NEEDLE 25G X 1 1/2

## (undated) DEVICE — GLOVE INDICATOR PI UNDERGLOVE SZ 7.5 BLUE

## (undated) DEVICE — CHLORHEXIDINE 4PCT 4 OZ

## (undated) DEVICE — PLASMABLADE X PS210-030S-LIGHT 3.0SL: Brand: PLASMABLADE™ X

## (undated) DEVICE — TUBING SUCTION 5MM X 12 FT

## (undated) DEVICE — BULB SYRINGE,IRRIGATION WITH PROTECTIVE CAP: Brand: DOVER

## (undated) DEVICE — ACE WRAP 6 IN UNSTERILE

## (undated) DEVICE — TELFA NON-ADHERENT ABSORBENT DRESSING: Brand: TELFA

## (undated) DEVICE — TROCAR: Brand: KII FIOS FIRST ENTRY

## (undated) DEVICE — GLOVE PI ULTRA TOUCH SZ.7.0

## (undated) DEVICE — PREMIUM DRY TRAY LF: Brand: MEDLINE INDUSTRIES, INC.

## (undated) DEVICE — SYRINGE 10ML LL

## (undated) DEVICE — ENSEAL X1 TISSUE SEALER, CURVED JAW, 37 CM SHAFT LENGTH: Brand: ENSEAL

## (undated) DEVICE — PLUMEPEN PRO 10FT

## (undated) DEVICE — UTILITY MARKER,BLACK WITH LABELS: Brand: DEVON

## (undated) DEVICE — DRAPE SHEET THREE QUARTER

## (undated) DEVICE — STERILE EMESIS BASIN                 070: Brand: CARDINAL HEALTH

## (undated) DEVICE — PACK PBDS MINOR GYN LAP RF

## (undated) DEVICE — ANTIBACTERIAL UNDYED BRAIDED (POLYGLACTIN 910), SYNTHETIC ABSORBABLE SUTURE: Brand: COATED VICRYL

## (undated) DEVICE — INTENDED FOR TISSUE SEPARATION, AND OTHER PROCEDURES THAT REQUIRE A SHARP SURGICAL BLADE TO PUNCTURE OR CUT.: Brand: BARD-PARKER SAFETY BLADES SIZE 11, STERILE

## (undated) DEVICE — SUT VICRYL 2-0 SH 27 IN UNDYED J417H

## (undated) DEVICE — MAYO STAND COVER: Brand: CONVERTORS

## (undated) DEVICE — GLOVE SRG BIOGEL 6.5

## (undated) DEVICE — SCD SEQUENTIAL COMPRESSION COMFORT SLEEVE MEDIUM KNEE LENGTH: Brand: KENDALL SCD

## (undated) DEVICE — TRAY FOLEY 16FR URIMETER SURESTEP

## (undated) DEVICE — PACK UNIVERSAL DRAPES SUB-Q ICD